# Patient Record
Sex: MALE | Race: WHITE | Employment: UNEMPLOYED | URBAN - NONMETROPOLITAN AREA
[De-identification: names, ages, dates, MRNs, and addresses within clinical notes are randomized per-mention and may not be internally consistent; named-entity substitution may affect disease eponyms.]

---

## 2021-10-26 ENCOUNTER — OFFICE VISIT (OUTPATIENT)
Dept: CARDIOLOGY CLINIC | Age: 61
End: 2021-10-26
Payer: COMMERCIAL

## 2021-10-26 VITALS
HEIGHT: 72 IN | HEART RATE: 133 BPM | DIASTOLIC BLOOD PRESSURE: 70 MMHG | WEIGHT: 151.6 LBS | SYSTOLIC BLOOD PRESSURE: 132 MMHG | BODY MASS INDEX: 20.53 KG/M2

## 2021-10-26 DIAGNOSIS — M79.89 LEG SWELLING: Primary | ICD-10-CM

## 2021-10-26 DIAGNOSIS — I48.21 PERMANENT ATRIAL FIBRILLATION (HCC): ICD-10-CM

## 2021-10-26 DIAGNOSIS — I42.0 DILATED CARDIOMYOPATHY (HCC): ICD-10-CM

## 2021-10-26 PROCEDURE — 93000 ELECTROCARDIOGRAM COMPLETE: CPT | Performed by: NUCLEAR MEDICINE

## 2021-10-26 PROCEDURE — 99204 OFFICE O/P NEW MOD 45 MIN: CPT | Performed by: NUCLEAR MEDICINE

## 2021-10-26 RX ORDER — METOPROLOL TARTRATE 100 MG/1
100 TABLET ORAL 2 TIMES DAILY
COMMUNITY
End: 2021-10-26 | Stop reason: SDUPTHER

## 2021-10-26 RX ORDER — FUROSEMIDE 40 MG/1
40 TABLET ORAL 2 TIMES DAILY
COMMUNITY
End: 2021-10-26 | Stop reason: ALTCHOICE

## 2021-10-26 RX ORDER — METOPROLOL TARTRATE 100 MG/1
100 TABLET ORAL 2 TIMES DAILY
Qty: 60 TABLET | Refills: 3 | Status: ON HOLD | OUTPATIENT
Start: 2021-10-26 | End: 2021-12-06

## 2021-10-26 RX ORDER — POTASSIUM CHLORIDE 1500 MG/1
20 TABLET, FILM COATED, EXTENDED RELEASE ORAL 2 TIMES DAILY
Qty: 60 TABLET | Refills: 3 | Status: SHIPPED | OUTPATIENT
Start: 2021-10-26 | End: 2021-12-13

## 2021-10-26 RX ORDER — POTASSIUM CHLORIDE 1500 MG/1
20 TABLET, FILM COATED, EXTENDED RELEASE ORAL 2 TIMES DAILY
COMMUNITY
End: 2021-10-26 | Stop reason: SDUPTHER

## 2021-10-26 RX ORDER — LANSOPRAZOLE 15 MG/1
15 CAPSULE, DELAYED RELEASE ORAL DAILY
COMMUNITY
End: 2022-02-24

## 2021-10-26 RX ORDER — METOPROLOL TARTRATE 75 MG/1
75 TABLET, FILM COATED ORAL 2 TIMES DAILY
COMMUNITY
End: 2021-10-26 | Stop reason: ALTCHOICE

## 2021-10-26 RX ORDER — BUMETANIDE 2 MG/1
2 TABLET ORAL 2 TIMES DAILY
Qty: 60 TABLET | Refills: 3 | Status: SHIPPED | OUTPATIENT
Start: 2021-10-26 | End: 2021-12-13

## 2021-10-26 RX ORDER — BUMETANIDE 2 MG/1
2 TABLET ORAL 2 TIMES DAILY
COMMUNITY
End: 2021-10-26 | Stop reason: SDUPTHER

## 2021-10-26 RX ORDER — LOSARTAN POTASSIUM 25 MG/1
25 TABLET ORAL DAILY
COMMUNITY
End: 2021-10-26 | Stop reason: SDUPTHER

## 2021-10-26 RX ORDER — LOSARTAN POTASSIUM 25 MG/1
25 TABLET ORAL DAILY
Qty: 30 TABLET | Refills: 3 | Status: SHIPPED | OUTPATIENT
Start: 2021-10-26 | End: 2021-12-22 | Stop reason: ALTCHOICE

## 2021-10-26 RX ORDER — DIAZEPAM 10 MG/1
2.5 TABLET ORAL 2 TIMES DAILY PRN
Status: ON HOLD | COMMUNITY
End: 2021-12-06

## 2021-10-26 ASSESSMENT — ENCOUNTER SYMPTOMS
ABDOMINAL DISTENTION: 0
ABDOMINAL PAIN: 0
BLOOD IN STOOL: 0
PHOTOPHOBIA: 1
CONSTIPATION: 0
COLOR CHANGE: 0
VOMITING: 0
DIARRHEA: 0
RECTAL PAIN: 0
ANAL BLEEDING: 0
BACK PAIN: 0
CHEST TIGHTNESS: 0
NAUSEA: 0
SHORTNESS OF BREATH: 1

## 2021-10-26 NOTE — PROGRESS NOTES
[address] [address] [address] [facility],4Th Floor [address] [facility]  Dept: [phone]  Dept Fax: [phone]  Loc: [phone]    Visit Date: 10/26/2021    Vanessa Mcdaniel is a 56 y.o. male who presents todayfor:  Chief Complaint   Patient presents with    New Patient    Congestive Heart Failure    Atrial Fibrillation   here for the first time  Looks like admitted for CHF   Was found to be in CHF   Echo with EF 15%  Also found to have A fib RVR   Looks like he left AMA from there  Here for evaluation   No known CAD before  No cath before  Does have dyspnea  Does have dyspnea on exertion   No obvious chest pain   BP is lower at times  No smoking  Used to smoke  Family history of CAD       HPI:  HPI  No past medical history on file. No past surgical history on file. No family history on file. Social History     Tobacco Use    Smoking status: Not on file   Substance Use Topics    Alcohol use: Not on file      Current Outpatient Medications   Medication Sig Dispense Refill    diazePAM (VALIUM) 10 MG tablet Take 2.5 mg by mouth 2 times daily as needed for Anxiety.  furosemide (LASIX) 40 MG tablet Take 40 mg by mouth 2 times daily      Metoprolol Tartrate 75 MG TABS Take 75 mg by mouth 2 times daily      lansoprazole (PREVACID) 15 MG delayed release capsule Take 15 mg by mouth daily      apixaban (ELIQUIS) 5 MG TABS tablet Take by mouth 2 times daily       No current facility-administered medications for this visit.      Not on File  Health Maintenance   Topic Date Due    Hepatitis C screen  Never done    COVID-19 Vaccine (1) Never done    HIV screen  Never done    Lipid screen  Never done    Colon cancer screen colonoscopy  Never done    DTaP/Tdap/Td vaccine (1 - Tdap) 09/10/2005    Shingles Vaccine (1 of 2) Never done    Flu vaccine (1) Never done    Hepatitis A vaccine  Aged Out    Hepatitis B vaccine  Aged Out    Hib vaccine  Aged C/ Shawn Alon 19 Meningococcal (ACWY) vaccine  Aged Out    Pneumococcal 0-64 years Vaccine  Aged Out       Subjective:  Review of Systems   Constitutional: Positive for fatigue. HENT: Negative for ear discharge and mouth sores. Eyes: Positive for photophobia. Respiratory: Positive for shortness of breath. Negative for chest tightness. Cardiovascular: Negative for chest pain, palpitations and leg swelling. Gastrointestinal: Negative for abdominal distention, abdominal pain, anal bleeding, blood in stool, constipation, diarrhea, nausea, rectal pain and vomiting. Endocrine: Negative for polyphagia. Genitourinary: Negative for enuresis, hematuria and urgency. Musculoskeletal: Negative for arthralgias, back pain, gait problem, joint swelling, myalgias, neck pain and neck stiffness. Skin: Negative for color change, pallor, rash and wound. Neurological: Negative for dizziness, syncope and light-headedness. Psychiatric/Behavioral: Negative for confusion, decreased concentration, dysphoric mood and hallucinations. The patient is not hyperactive. Objective:  Physical Exam  HENT:      Head: Normocephalic. Right Ear: Tympanic membrane normal.      Nose: Nose normal.      Mouth/Throat:      Mouth: Mucous membranes are moist.   Eyes:      Pupils: Pupils are equal, round, and reactive to light. Cardiovascular:      Rate and Rhythm: Normal rate and regular rhythm. Heart sounds: Murmur heard. No gallop. Pulmonary:      Effort: No respiratory distress. Breath sounds: No stridor. No wheezing, rhonchi or rales. Chest:      Chest wall: No tenderness. Abdominal:      General: There is no distension. Palpations: There is no mass. Tenderness: There is no abdominal tenderness. There is no right CVA tenderness, left CVA tenderness, guarding or rebound. Hernia: No hernia is present. Musculoskeletal:         General: No swelling, tenderness, deformity or signs of injury.       Cervical back: Normal range of motion. Right lower leg: No edema. Left lower leg: No edema. Skin:     Coloration: Skin is not jaundiced or pale. Findings: No bruising, erythema, lesion or rash. Neurological:      Mental Status: He is alert and oriented to person, place, and time. Cranial Nerves: No cranial nerve deficit. Sensory: No sensory deficit. Motor: No weakness. Coordination: Coordination normal.      Gait: Gait normal.      Deep Tendon Reflexes: Reflexes normal.   Psychiatric:         Mood and Affect: Mood normal.       /70   Pulse 133   Ht 6' (1.829 m)   Wt 151 lb 9.6 oz (68.8 kg)   BMI 20.56 kg/m²     Assessment:      Diagnosis Orders   1. Leg swelling  EKG 12 Lead   2. Permanent atrial fibrillation ([address]     3. Dilated cardiomyopathy ([address]     new onset A fib   New onset CHF   Severe CMP   Heart failure  Edema  Higher HR  ECG in office was done today. I reviewed the ECG. No acute findings, A fib RVR       Plan:  No follow-ups on file. Concerning patient   Needs cardioversion   Change metoprolol 100 mg bid  Add ACEI   Change to bumex  Add K dur  Check labs  Needs ischemia work up after that   Stress test vs cath   Continue risk factor modification and medical management  Thank you for allowing me to participate in the care of your patient. Please don't hesitate to contact me regarding any further issues related to the patient care    Orders Placed:  Orders Placed This Encounter   Procedures    EKG 12 Lead     Order Specific Question:   Reason for Exam?     Answer: Other       Medications Prescribed:  No orders of the defined types were placed in this encounter. Discussed use, benefit, and side effects of prescribed medications. All patient questions answered. Pt voicedunderstanding. Instructed to continue current medications, diet and exercise. Continue risk factor modification and medical management. Patient agreed with treatment plan.  Follow up as directed.     Electronically signedby Lotus Dexter MD on 10/26/2021 at 3:01 PM

## 2021-10-28 ENCOUNTER — TELEPHONE (OUTPATIENT)
Dept: CARDIOLOGY CLINIC | Age: 61
End: 2021-10-28

## 2021-10-28 NOTE — TELEPHONE ENCOUNTER
Evans/cv scheduled 11-10-21 @ 3:00pm  Pt informed, date, time and instructions reviewed and mailed to pt

## 2021-11-01 ENCOUNTER — TELEPHONE (OUTPATIENT)
Dept: CARDIOLOGY CLINIC | Age: 61
End: 2021-11-01

## 2021-11-09 ENCOUNTER — PREP FOR PROCEDURE (OUTPATIENT)
Dept: CARDIOLOGY | Age: 61
End: 2021-11-09

## 2021-11-09 RX ORDER — SODIUM CHLORIDE 0.9 % (FLUSH) 0.9 %
5-40 SYRINGE (ML) INJECTION PRN
Status: CANCELLED | OUTPATIENT
Start: 2021-11-09

## 2021-11-09 RX ORDER — SODIUM CHLORIDE 0.9 % (FLUSH) 0.9 %
5-40 SYRINGE (ML) INJECTION EVERY 12 HOURS SCHEDULED
Status: CANCELLED | OUTPATIENT
Start: 2021-11-09

## 2021-11-09 RX ORDER — SODIUM CHLORIDE 9 MG/ML
25 INJECTION, SOLUTION INTRAVENOUS PRN
Status: CANCELLED | OUTPATIENT
Start: 2021-11-09

## 2021-11-09 RX ORDER — SODIUM CHLORIDE 9 MG/ML
INJECTION, SOLUTION INTRAVENOUS CONTINUOUS
Status: CANCELLED | OUTPATIENT
Start: 2021-11-09

## 2021-11-10 ENCOUNTER — APPOINTMENT (OUTPATIENT)
Dept: CARDIAC CATH/INVASIVE PROCEDURES | Age: 61
End: 2021-11-10
Payer: COMMERCIAL

## 2021-11-10 PROCEDURE — 6370000000 HC RX 637 (ALT 250 FOR IP)

## 2021-11-11 ENCOUNTER — TELEPHONE (OUTPATIENT)
Dept: CARDIOLOGY CLINIC | Age: 61
End: 2021-11-11

## 2021-11-11 DIAGNOSIS — R93.1 ABNORMAL ECHOCARDIOGRAM: Primary | ICD-10-CM

## 2021-11-11 DIAGNOSIS — R94.30 EJECTION FRACTION < 50%: ICD-10-CM

## 2021-11-11 NOTE — TELEPHONE ENCOUNTER
MICHELLE from nurse on 2E that patient needs arrangements for a Life Vest. Has appt. scheduled with Dr. Juanita Gutierres Better patient.

## 2021-11-12 ENCOUNTER — TELEPHONE (OUTPATIENT)
Dept: CARDIOLOGY CLINIC | Age: 61
End: 2021-11-12

## 2021-11-12 NOTE — TELEPHONE ENCOUNTER
PROCEDURE: CARDIAC CATH. DATE OF SERVICE: 12/06/2021. SERVICE LOCATION: [facility]    CPT CODE: 85626    PHYSICIAN: DR Juliette Kawasaki. DATE PRIOR AUTH SUBMITTED: 11/12/2021    STATUS:  Approved.     CASE NUMBER: [id number]    AUTH NUMBER: [id number]    VALID: 12/06/2021

## 2021-11-12 NOTE — TELEPHONE ENCOUNTER
Patient verbalized understanding. Orders given to scheduling. DME faxed to [address] [phone] and [facility] notified. Cath order given to scheduling.

## 2021-12-06 ENCOUNTER — HOSPITAL ENCOUNTER (OUTPATIENT)
Dept: INPATIENT UNIT | Age: 61
Discharge: HOME | End: 2021-12-07
Attending: NUCLEAR MEDICINE | Admitting: INTERNAL MEDICINE
Payer: COMMERCIAL

## 2021-12-06 ENCOUNTER — PREP FOR PROCEDURE (OUTPATIENT)
Dept: CARDIOLOGY | Age: 61
End: 2021-12-06

## 2021-12-06 PROBLEM — I25.10 CAD IN NATIVE ARTERY: Status: ACTIVE | Noted: 2021-12-06

## 2021-12-06 LAB
ABO: NORMAL
ACTIVATED CLOTTING TIME: 386 SECONDS (ref 1–150)
ANION GAP SERPL CALCULATED.3IONS-SCNC: 10 MEQ/L (ref 8–16)
ANTIBODY SCREEN: NORMAL
APTT: 33.1 SECONDS (ref 22–38)
BUN BLDV-MCNC: 10 MG/DL (ref 7–22)
CALCIUM SERPL-MCNC: 8.9 MG/DL (ref 8.5–10.5)
CHLORIDE BLD-SCNC: 101 MEQ/L (ref 98–111)
CHOLESTEROL, TOTAL: 173 MG/DL (ref 100–199)
CO2: 24 MEQ/L (ref 23–33)
CREAT SERPL-MCNC: 0.8 MG/DL (ref 0.4–1.2)
EKG Q-T INTERVAL: 330 MS
EKG QRS DURATION: 70 MS
EKG QTC CALCULATION (BAZETT): 419 MS
EKG R AXIS: 72 DEGREES
EKG T AXIS: 75 DEGREES
EKG VENTRICULAR RATE: 97 BPM
ERYTHROCYTE [DISTWIDTH] IN BLOOD BY AUTOMATED COUNT: 12.2 % (ref 11.5–14.5)
ERYTHROCYTE [DISTWIDTH] IN BLOOD BY AUTOMATED COUNT: 43.8 FL (ref 35–45)
GFR SERPL CREATININE-BSD FRML MDRD: > 90 ML/MIN/1.73M2
GLUCOSE BLD-MCNC: 83 MG/DL (ref 70–108)
HCT VFR BLD CALC: 46.3 % (ref 42–52)
HDLC SERPL-MCNC: 66 MG/DL
HEMOGLOBIN: 15.9 GM/DL (ref 14–18)
INR BLD: 0.88 (ref 0.85–1.13)
LDL CHOLESTEROL CALCULATED: 93 MG/DL
MCH RBC QN AUTO: 33.3 PG (ref 26–33)
MCHC RBC AUTO-ENTMCNC: 34.3 GM/DL (ref 32.2–35.5)
MCV RBC AUTO: 97.1 FL (ref 80–94)
PLATELET # BLD: 197 THOU/MM3 (ref 130–400)
PMV BLD AUTO: 9.8 FL (ref 9.4–12.4)
POTASSIUM SERPL-SCNC: 5.2 MEQ/L (ref 3.5–5.2)
RBC # BLD: 4.77 MILL/MM3 (ref 4.7–6.1)
RH FACTOR: NORMAL
SODIUM BLD-SCNC: 135 MEQ/L (ref 135–145)
TRIGL SERPL-MCNC: 72 MG/DL (ref 0–199)
WBC # BLD: 10.8 THOU/MM3 (ref 4.8–10.8)

## 2021-12-06 PROCEDURE — 92928 PRQ TCAT PLMT NTRAC ST 1 LES: CPT | Performed by: NUCLEAR MEDICINE

## 2021-12-06 PROCEDURE — C1725 CATH, TRANSLUMIN NON-LASER: HCPCS

## 2021-12-06 PROCEDURE — 85347 COAGULATION TIME ACTIVATED: CPT

## 2021-12-06 PROCEDURE — C1874 STENT, COATED/COV W/DEL SYS: HCPCS

## 2021-12-06 PROCEDURE — 6370000000 HC RX 637 (ALT 250 FOR IP): Performed by: NURSE PRACTITIONER

## 2021-12-06 PROCEDURE — 86900 BLOOD TYPING SEROLOGIC ABO: CPT

## 2021-12-06 PROCEDURE — 85730 THROMBOPLASTIN TIME PARTIAL: CPT

## 2021-12-06 PROCEDURE — 86850 RBC ANTIBODY SCREEN: CPT

## 2021-12-06 PROCEDURE — 85027 COMPLETE CBC AUTOMATED: CPT

## 2021-12-06 PROCEDURE — 92928 PRQ TCAT PLMT NTRAC ST 1 LES: CPT | Performed by: INTERNAL MEDICINE

## 2021-12-06 PROCEDURE — 36415 COLL VENOUS BLD VENIPUNCTURE: CPT

## 2021-12-06 PROCEDURE — 80048 BASIC METABOLIC PNL TOTAL CA: CPT

## 2021-12-06 PROCEDURE — C1894 INTRO/SHEATH, NON-LASER: HCPCS

## 2021-12-06 PROCEDURE — 80061 LIPID PANEL: CPT

## 2021-12-06 PROCEDURE — 85610 PROTHROMBIN TIME: CPT

## 2021-12-06 PROCEDURE — 2580000003 HC RX 258: Performed by: NURSE PRACTITIONER

## 2021-12-06 PROCEDURE — 6360000004 HC RX CONTRAST MEDICATION: Performed by: NUCLEAR MEDICINE

## 2021-12-06 PROCEDURE — 93458 L HRT ARTERY/VENTRICLE ANGIO: CPT | Performed by: NUCLEAR MEDICINE

## 2021-12-06 PROCEDURE — 93010 ELECTROCARDIOGRAM REPORT: CPT | Performed by: NUCLEAR MEDICINE

## 2021-12-06 PROCEDURE — 86901 BLOOD TYPING SEROLOGIC RH(D): CPT

## 2021-12-06 PROCEDURE — C1887 CATHETER, GUIDING: HCPCS

## 2021-12-06 PROCEDURE — 93005 ELECTROCARDIOGRAM TRACING: CPT | Performed by: NURSE PRACTITIONER

## 2021-12-06 PROCEDURE — 6370000000 HC RX 637 (ALT 250 FOR IP): Performed by: INTERNAL MEDICINE

## 2021-12-06 PROCEDURE — C1769 GUIDE WIRE: HCPCS

## 2021-12-06 RX ORDER — SODIUM CHLORIDE 0.9 % (FLUSH) 0.9 %
5-40 SYRINGE (ML) INJECTION PRN
Status: CANCELLED | OUTPATIENT
Start: 2021-12-06

## 2021-12-06 RX ORDER — SODIUM CHLORIDE 0.9 % (FLUSH) 0.9 %
5-40 SYRINGE (ML) INJECTION EVERY 12 HOURS SCHEDULED
Status: DISCONTINUED | OUTPATIENT
Start: 2021-12-06 | End: 2021-12-07 | Stop reason: HOSPADM

## 2021-12-06 RX ORDER — SODIUM CHLORIDE 9 MG/ML
25 INJECTION, SOLUTION INTRAVENOUS PRN
Status: CANCELLED | OUTPATIENT
Start: 2021-12-06

## 2021-12-06 RX ORDER — AMIODARONE HYDROCHLORIDE 200 MG/1
200 TABLET ORAL DAILY
Status: DISCONTINUED | OUTPATIENT
Start: 2021-12-07 | End: 2021-12-07 | Stop reason: HOSPADM

## 2021-12-06 RX ORDER — ASPIRIN 325 MG
325 TABLET ORAL ONCE
Status: CANCELLED | OUTPATIENT
Start: 2021-12-06 | End: 2021-12-06

## 2021-12-06 RX ORDER — CLOPIDOGREL BISULFATE 75 MG/1
75 TABLET ORAL DAILY
Status: DISCONTINUED | OUTPATIENT
Start: 2021-12-08 | End: 2021-12-07 | Stop reason: HOSPADM

## 2021-12-06 RX ORDER — SODIUM CHLORIDE 9 MG/ML
INJECTION, SOLUTION INTRAVENOUS CONTINUOUS
Status: DISCONTINUED | OUTPATIENT
Start: 2021-12-06 | End: 2021-12-07 | Stop reason: HOSPADM

## 2021-12-06 RX ORDER — DIAZEPAM 5 MG/1
2.5 TABLET ORAL PRN
COMMUNITY

## 2021-12-06 RX ORDER — SODIUM CHLORIDE 9 MG/ML
25 INJECTION, SOLUTION INTRAVENOUS PRN
Status: DISCONTINUED | OUTPATIENT
Start: 2021-12-06 | End: 2021-12-07 | Stop reason: HOSPADM

## 2021-12-06 RX ORDER — SODIUM CHLORIDE 9 MG/ML
INJECTION, SOLUTION INTRAVENOUS CONTINUOUS
Status: DISCONTINUED | OUTPATIENT
Start: 2021-12-06 | End: 2021-12-06

## 2021-12-06 RX ORDER — NITROGLYCERIN 0.4 MG/1
0.4 TABLET SUBLINGUAL EVERY 5 MIN PRN
Status: DISCONTINUED | OUTPATIENT
Start: 2021-12-06 | End: 2021-12-07 | Stop reason: HOSPADM

## 2021-12-06 RX ORDER — SODIUM CHLORIDE 0.9 % (FLUSH) 0.9 %
5-40 SYRINGE (ML) INJECTION PRN
Status: DISCONTINUED | OUTPATIENT
Start: 2021-12-06 | End: 2021-12-07 | Stop reason: HOSPADM

## 2021-12-06 RX ORDER — METOPROLOL SUCCINATE 100 MG/1
100 TABLET, EXTENDED RELEASE ORAL DAILY
Status: DISCONTINUED | OUTPATIENT
Start: 2021-12-07 | End: 2021-12-07 | Stop reason: HOSPADM

## 2021-12-06 RX ORDER — ASPIRIN 325 MG
325 TABLET ORAL ONCE
Status: COMPLETED | OUTPATIENT
Start: 2021-12-06 | End: 2021-12-06

## 2021-12-06 RX ORDER — SODIUM CHLORIDE 0.9 % (FLUSH) 0.9 %
5-40 SYRINGE (ML) INJECTION EVERY 12 HOURS SCHEDULED
Status: DISCONTINUED | OUTPATIENT
Start: 2021-12-06 | End: 2021-12-06

## 2021-12-06 RX ORDER — ASPIRIN 81 MG/1
81 TABLET ORAL DAILY
Status: DISCONTINUED | OUTPATIENT
Start: 2021-12-06 | End: 2021-12-07 | Stop reason: HOSPADM

## 2021-12-06 RX ORDER — SODIUM CHLORIDE 9 MG/ML
25 INJECTION, SOLUTION INTRAVENOUS PRN
Status: DISCONTINUED | OUTPATIENT
Start: 2021-12-06 | End: 2021-12-06

## 2021-12-06 RX ORDER — ROSUVASTATIN CALCIUM 20 MG/1
40 TABLET, COATED ORAL NIGHTLY
Status: DISCONTINUED | OUTPATIENT
Start: 2021-12-06 | End: 2021-12-07 | Stop reason: HOSPADM

## 2021-12-06 RX ORDER — SODIUM CHLORIDE 9 MG/ML
INJECTION, SOLUTION INTRAVENOUS CONTINUOUS
Status: CANCELLED | OUTPATIENT
Start: 2021-12-06

## 2021-12-06 RX ORDER — SODIUM CHLORIDE 0.9 % (FLUSH) 0.9 %
5-40 SYRINGE (ML) INJECTION PRN
Status: DISCONTINUED | OUTPATIENT
Start: 2021-12-06 | End: 2021-12-06

## 2021-12-06 RX ORDER — SODIUM CHLORIDE 0.9 % (FLUSH) 0.9 %
5-40 SYRINGE (ML) INJECTION EVERY 12 HOURS SCHEDULED
Status: CANCELLED | OUTPATIENT
Start: 2021-12-06

## 2021-12-06 RX ORDER — ACETAMINOPHEN 325 MG/1
650 TABLET ORAL EVERY 4 HOURS PRN
Status: DISCONTINUED | OUTPATIENT
Start: 2021-12-06 | End: 2021-12-07 | Stop reason: HOSPADM

## 2021-12-06 RX ORDER — LOSARTAN POTASSIUM 25 MG/1
25 TABLET ORAL DAILY
Status: DISCONTINUED | OUTPATIENT
Start: 2021-12-07 | End: 2021-12-07 | Stop reason: HOSPADM

## 2021-12-06 RX ORDER — CLOPIDOGREL BISULFATE 75 MG/1
300 TABLET ORAL ONCE
Status: COMPLETED | OUTPATIENT
Start: 2021-12-07 | End: 2021-12-07

## 2021-12-06 RX ORDER — NITROGLYCERIN 0.4 MG/1
0.4 TABLET SUBLINGUAL EVERY 5 MIN PRN
Status: CANCELLED | OUTPATIENT
Start: 2021-12-06

## 2021-12-06 RX ADMIN — SODIUM CHLORIDE: 9 INJECTION, SOLUTION INTRAVENOUS at 12:42

## 2021-12-06 RX ADMIN — ROSUVASTATIN CALCIUM 40 MG: 20 TABLET, FILM COATED ORAL at 20:24

## 2021-12-06 RX ADMIN — ASPIRIN 325 MG: 325 TABLET ORAL at 13:09

## 2021-12-06 RX ADMIN — IOPAMIDOL 160 ML: 755 INJECTION, SOLUTION INTRAVENOUS at 14:54

## 2021-12-06 ASSESSMENT — PAIN SCALES - GENERAL: PAINLEVEL_OUTOF10: 0

## 2021-12-06 NOTE — H&P
[address] [address]  Sedation/Analgesia History & Physical    Pt Name: Ashley Lopez  Account number: [de-identified]  MRN: [id number]  YOB: 1960  Provider Performing Procedure: Mindy Black MD MD [facility]  Primary Care Physician: Gweneth Cranker, DO  Date: 12/6/2021    PRE-PROCEDURE    Code Status: FULL CODE  Brief History/Pre-Procedure Diagnosis:   Cardiomyopathy      Consent: : I have discussed with the patient risks, benefits, and alternatives (and relevant risks, benefits, and side effects related to alternatives or not receiving care), and likelihood of the success. The patient and/or representative appear to understand and agree to proceed. The discussion encompasses risks, benefits, and side effects related to the alternatives and the risks related to not receiving the proposed care, treatment, and services. MEDICAL HISTORY  []ASHD/ANGINA/MI/CHF   [x]Hypertension  []Diabetes  []Hyperlipidemia  []Smoking  []Family Hx of ASHD  []Additional information:       has a past medical history of Arthritis, CAD (coronary artery disease), and Hypertension. SURGICAL HISTORY   has a past surgical history that includes Carpal tunnel release (Right); Rotator cuff repair; and Tonsillectomy.   Additional information:       ALLERGIES   Allergies as of 12/06/2021    (No Known Allergies)     Additional information:       MEDICATIONS   Aspirin  [x] 81 mg  [] 325 mg  [] None  Antiplatelet drug therapy use last 5 days  []No []Yes  Coumadin Use Last 5 Days []No []Yes  Other anticoagulant use last 5 days  []No []Yes    Current Facility-Administered Medications:     0.9 % sodium chloride infusion, , IntraVENous, Continuous, Laly L Hempfling, APRN - CNP    0.9 % sodium chloride infusion, 25 mL, IntraVENous, PRN, Laly L Hempfling, APRN - CNP    aspirin tablet 325 mg, 325 mg, Oral, Once, Laly L Hempfling, APRN - CNP    nitroGLYCERIN (NITROSTAT) SL tablet 0.4 mg, 0.4 mg, SubLINGual, Q5 Min PRN, Laly L Hempfling, APRN - CNP    sodium chloride flush 0.9 % injection 5-40 mL, 5-40 mL, IntraVENous, 2 times per day, Laly L Hempfling, APRN - CNP    sodium chloride flush 0.9 % injection 5-40 mL, 5-40 mL, IntraVENous, PRN, Laly L Hempfling, APRN - CNP  Prior to Admission medications    Medication Sig Start Date End Date Taking? Authorizing Provider   Cholecalciferol (D3 VITAMIN PO) Take 1 capsule by mouth daily    Historical Provider, MD   metoprolol succinate (TOPROL XL) 100 MG extended release tablet Take 1 tablet by mouth daily 11/10/21   Panfilo Bird MD   amiodarone (CORDARONE) 200 MG tablet Take 1 tablet by mouth daily 11/10/21   Panfilo Bird MD   diazePAM (VALIUM) 10 MG tablet Take 2.5 mg by mouth 2 times daily as needed for Anxiety. Historical Provider, MD   lansoprazole (PREVACID) 15 MG delayed release capsule Take 15 mg by mouth daily    Historical Provider, MD   apixaban (ELIQUIS) 5 MG TABS tablet Take by mouth 2 times daily    Historical Provider, MD   bumetanide (BUMEX) 2 MG tablet Take 1 tablet by mouth 2 times daily 10/26/21   Zoheir A Shelley Brunner, MD   losartan (COZAAR) 25 MG tablet Take 1 tablet by mouth daily 10/26/21   Panfilo Bird MD   metoprolol (LOPRESSOR) 100 MG tablet Take 1 tablet by mouth 2 times daily 10/26/21   Panfilo Bird MD   potassium chloride (KLOR-CON M) 20 MEQ TBCR extended release tablet Take 1 tablet by mouth 2 times daily 10/26/21   Panfilo Bird MD     Additional information:       VITAL SIGNS   There were no vitals filed for this visit.     PHYSICAL:   General: No acute distress  HEENT:  Unremarkable for age  Neck: without increased JVD, carotid pulses 2+ bilaterally without bruits  Heart: RRR, S1 & S2 WNL, S4 gallop, without murmurs or rubs    Lungs: Clear to auscultation    Abdomen: BS present, without HSM, masses, or tenderness    Extremities: without C,C,E.  Pulses 2+ bilaterally  Mental Status: Alert & Oriented        PLANNED PROCEDURE   [x]Cath  []PCI                []Pacemaker/AICD  []YANDEL             []Cardioversion []Peripheral angiography/PTA  []Other:      SEDATION  Planned agent:[x]Midazolam []Meperidine [x]Sublimaze []Morphine  []Diazepam  []Other:     ASA Classification:  []1 [x]2 []3 []4 []5  Class 1: A normal healthy patient  Class 2: Pt with mild to moderate systemic disease  Class 3: Severe systemic disease or disturbance  Class 4: Severe systemic disorders that are already life threatening. Class 5: Moribund pt with little chances of survival, for more than 24 hours. Mallampati I Airway Classification:   []1 [x]2 []3 []4    [x]Pre-procedure diagnostic studies complete and results available. Comment:    [x]Previous sedation/anesthesia experiences assessed. Comment:    [x]The patient is an appropriate candidate to undergo the planned procedure sedation and anesthesia. (Refer to nursing sedation/analgesia documentation record)  [x]Formulation and discussion of sedation/procedure plan, risks, and expectations with patient and/or responsible adult completed. [x]Patient examined immediately prior to the procedure.  (Refer to nursing sedation/analgesia documentation record)    Ana Cristina MD MD [facility]  Electronically signed 12/6/2021 at 12:20 PM

## 2021-12-06 NOTE — PROGRESS NOTES
Pt admitted to  2E16 ambulatory for cardiac catheterization with possible PCI with Dr Manda Lew. Pt NPO. Patient is alone, will call his daughter at time of discharge. .   Vital signs obtained. Assessment and data collection initiated. Oriented to room. Policies and procedures for  explained   All questions answered with no further questions at this time. Fall prevention and safety precautions discussed with patient.

## 2021-12-06 NOTE — PROGRESS NOTES
Care taken over from cath lab. Right radial site stable, no bleeding seen, site soft. Armboard continued with vascband. Patient instructed not to bend wrist, not to put pressure on wrist and not to lift or twist with wrist. Patient voices understanding. No family present. 0.9 NS infusing. Pt tolerating sips of water. Will continue to [facility].

## 2021-12-06 NOTE — PROCEDURES
[address] [id number]                            CARDIAC CATHETERIZATION    PATIENT NAME: Araceli Sparks                     :        1960  MED REC NO:   [id number]                           ROOM:       0016  ACCOUNT NO:   [de-identified]                           ADMIT DATE: 2021  PROVIDER:     Alexsandra Bustos M.D.    DATE OF PROCEDURE:  2021    CLINICAL HISTORY AND INDICATION:  This is a 49-year-old patient with  new-onset cardiomyopathy, referred for cardiac catheterization due to  significant cardiomyopathy and multiple risk factors for coronary artery  disease with recent onset atrial fibrillation. PROCEDURES:  1. Left heart cath with LV-gram.  2.  Coronary angiogram, right and left. 3.  Sedation:  2 of Versed, 25 of fentanyl between 02:00 and 02:30 p.m.  in my presence under my supervision. 4.  Blood loss less than 10 mL. PROCEDURE DETAILS:  Please refer to my catheterization detailed note. HEMODYNAMIC RESULTS AND LEFT VENTRICULOGRAM:  Left ventricular  end-diastolic pressure was 12 mmHg. No significant change before and  after contrast injection. No significant gradient across the aortic  valve to signify aortic stenosis. Left ventricular function was  globally hypokinetic.  EF of 35%. CORONARY ARTERIOGRAM RESULTS:  1. Left main is patent, gives rise to LAD and left circumflex. 2.  LAD has a mid 70% narrowing. 3.  Left circumflex artery is codominant, patent. 4.  Right coronary artery is large, dominant and has mild disease. CONCLUSION:  1. Single-vessel disease involving the mid LAD. 2.  Cardiomyopathy. 3.  No complications.     RECOMMENDATIONS:  At this point, case was discussed with interventional  team.  It was felt that due to the involvement of the LAD in a patient  with cardiomyopathy, would benefit from consideration of stenting of the  LAD which will be dictated in a separate report.         Ehsan Childs M.D.    D: 12/06/2021 14:35:13       T: 12/06/2021 14:40:02     [email or web address]  Job#: [id number]     Doc#: [id number]    CC:

## 2021-12-07 VITALS
HEIGHT: 72 IN | TEMPERATURE: 98 F | BODY MASS INDEX: 20.32 KG/M2 | DIASTOLIC BLOOD PRESSURE: 79 MMHG | HEART RATE: 96 BPM | OXYGEN SATURATION: 98 % | WEIGHT: 150 LBS | RESPIRATION RATE: 16 BRPM | SYSTOLIC BLOOD PRESSURE: 115 MMHG

## 2021-12-07 LAB
ANION GAP SERPL CALCULATED.3IONS-SCNC: 9 MEQ/L (ref 8–16)
BUN BLDV-MCNC: 15 MG/DL (ref 7–22)
CALCIUM SERPL-MCNC: 8.3 MG/DL (ref 8.5–10.5)
CHLORIDE BLD-SCNC: 105 MEQ/L (ref 98–111)
CO2: 21 MEQ/L (ref 23–33)
CREAT SERPL-MCNC: 0.8 MG/DL (ref 0.4–1.2)
ERYTHROCYTE [DISTWIDTH] IN BLOOD BY AUTOMATED COUNT: 12.2 % (ref 11.5–14.5)
ERYTHROCYTE [DISTWIDTH] IN BLOOD BY AUTOMATED COUNT: 42.8 FL (ref 35–45)
GFR SERPL CREATININE-BSD FRML MDRD: > 90 ML/MIN/1.73M2
GLUCOSE BLD-MCNC: 106 MG/DL (ref 70–108)
HCT VFR BLD CALC: 42.3 % (ref 42–52)
HEMOGLOBIN: 14.5 GM/DL (ref 14–18)
MCH RBC QN AUTO: 33 PG (ref 26–33)
MCHC RBC AUTO-ENTMCNC: 34.3 GM/DL (ref 32.2–35.5)
MCV RBC AUTO: 96.4 FL (ref 80–94)
PLATELET # BLD: 169 THOU/MM3 (ref 130–400)
PMV BLD AUTO: 10 FL (ref 9.4–12.4)
POTASSIUM SERPL-SCNC: 4.7 MEQ/L (ref 3.5–5.2)
RBC # BLD: 4.39 MILL/MM3 (ref 4.7–6.1)
SODIUM BLD-SCNC: 135 MEQ/L (ref 135–145)
WBC # BLD: 8.4 THOU/MM3 (ref 4.8–10.8)

## 2021-12-07 PROCEDURE — 80048 BASIC METABOLIC PNL TOTAL CA: CPT

## 2021-12-07 PROCEDURE — 6370000000 HC RX 637 (ALT 250 FOR IP): Performed by: INTERNAL MEDICINE

## 2021-12-07 PROCEDURE — 85027 COMPLETE CBC AUTOMATED: CPT

## 2021-12-07 PROCEDURE — 6370000000 HC RX 637 (ALT 250 FOR IP): Performed by: NUCLEAR MEDICINE

## 2021-12-07 PROCEDURE — 99232 SBSQ HOSP IP/OBS MODERATE 35: CPT | Performed by: STUDENT IN AN ORGANIZED HEALTH CARE EDUCATION/TRAINING PROGRAM

## 2021-12-07 PROCEDURE — 36415 COLL VENOUS BLD VENIPUNCTURE: CPT

## 2021-12-07 RX ORDER — CLOPIDOGREL BISULFATE 75 MG/1
75 TABLET ORAL ONCE
Status: COMPLETED | OUTPATIENT
Start: 2021-12-07 | End: 2021-12-07

## 2021-12-07 RX ORDER — CLOPIDOGREL BISULFATE 75 MG/1
75 TABLET ORAL DAILY
Qty: 30 TABLET | Refills: 3 | Status: SHIPPED | OUTPATIENT
Start: 2021-12-08 | End: 2022-01-03 | Stop reason: SDUPTHER

## 2021-12-07 RX ORDER — ROSUVASTATIN CALCIUM 40 MG/1
40 TABLET, COATED ORAL NIGHTLY
Qty: 30 TABLET | Refills: 3 | Status: SHIPPED | OUTPATIENT
Start: 2021-12-07 | End: 2022-01-05 | Stop reason: SDUPTHER

## 2021-12-07 RX ORDER — ASPIRIN 81 MG/1
81 TABLET ORAL DAILY
Qty: 30 TABLET | Refills: 3 | Status: SHIPPED | OUTPATIENT
Start: 2021-12-08 | End: 2022-01-03 | Stop reason: SDUPTHER

## 2021-12-07 RX ORDER — NITROGLYCERIN 0.4 MG/1
TABLET SUBLINGUAL
Qty: 25 TABLET | Refills: 3 | Status: SHIPPED | OUTPATIENT
Start: 2021-12-07 | End: 2022-10-20 | Stop reason: SDUPTHER

## 2021-12-07 RX ORDER — DIGOXIN 125 MCG
125 TABLET ORAL DAILY
Qty: 30 TABLET | Refills: 3 | Status: SHIPPED | OUTPATIENT
Start: 2021-12-07 | End: 2022-01-03 | Stop reason: SDUPTHER

## 2021-12-07 RX ADMIN — ASPIRIN 81 MG: 81 TABLET, COATED ORAL at 08:01

## 2021-12-07 RX ADMIN — METOPROLOL SUCCINATE 100 MG: 100 TABLET, EXTENDED RELEASE ORAL at 08:02

## 2021-12-07 RX ADMIN — CLOPIDOGREL BISULFATE 300 MG: 75 TABLET ORAL at 03:54

## 2021-12-07 RX ADMIN — CLOPIDOGREL BISULFATE 75 MG: 75 TABLET ORAL at 10:11

## 2021-12-07 RX ADMIN — LOSARTAN POTASSIUM 25 MG: 25 TABLET ORAL at 08:02

## 2021-12-07 RX ADMIN — AMIODARONE HYDROCHLORIDE 200 MG: 200 TABLET ORAL at 08:02

## 2021-12-07 NOTE — FLOWSHEET NOTE
Discharge instructions with AVS review completed. Stent card given to pt. Patient received pamphlet about cardiac intervention, how to take care of the incision site, mended hearts program, cardiac rehab and the hours of operations, and Nutritional information regarding cardiac diet. Questions and concerns addressed. Iv fluids stopped. Iv catheter removed. Telemetry off. Preparing for discharge to home post ptca implant.

## 2021-12-07 NOTE — PROCEDURES
[address] [place] [address]                            CARDIAC CATHETERIZATION    PATIENT NAME: Jessica Rueda                     :        1960  MED REC NO:   [id number]                           ROOM:       0016  ACCOUNT NO:   [de-identified]                           ADMIT DATE: 2021  PROVIDER:     Buckley Sacks, MD    DATE OF PROCEDURE:  2021    INDICATIONS FOR PROCEDURE:  New onset cardiomyopathy. PROCEDURES PERFORMED:  Successful PCI and stenting of the mid segment of  left anterior descending artery. DESCRIPTION OF PROCEDURE AND INTERVENTION DETAILS:  Risks, benefits,  indications, alternatives of the procedure were discussed with the  patient, he agreed to proceed. I did discuss the findings of the left  cardiac catheterization with Dr. Betzy Ma, please refer to his dictated  note for more details. The patient has evidence of severely stenotic  lesion in the mid segment of the left anterior descending artery. Cardiomyopathy is persistent with regional wall motion abnormalities  consistent with the mid to apical hypokinesis with reduced ejection  fraction based on the left ventriculogram today. The patient had recent  cardioversion and cardiomyopathy presence. A 6-Welsh EBU 3.5 guide  catheter was used to cannulate the left main coronary artery. Runthrough wire was used to cross the lesion. I then proceeded with  predilation using 3.0 x 12 mm PTCA balloon. Resolute Stacy 3.0 x 22 mm  drug-eluting stent was successfully deployed. This was postdilated  using a 3.5 x 12 mm noncompliant balloon. Wire was removed. Final  angiogram revealed well-expanded stent, 0% residual stenosis. No  complications including no dissection, distal embolization or  perforation. MEDICATIONS:  See MAR. COMPLICATIONS:  None. ESTIMATED BLOOD LOSS:  Less than 50 mL. ACCESS:  Right radial artery access.   Vasc Band was applied. Hemostasis  was achieved. IMPRESSION:  1. Ischemic cardiomyopathy. 2.  Coronary artery disease. 3.  Severely stenotic lesion of mid segment of left anterior descending  artery, status post successful PCI and stenting. PLAN:  The patient will be admitted to hospital for observation post PCI  of LAD. Aggressive risk factor modification. Dual-antiplatelet therapy  with aspirin and Plavix. High intensity statin therapy. Guideline-directed medical therapy for congestive heart failure with  reduced ejection fraction. May resume oral anticoagulation with Eliquis  tomorrow if condition remains stable. The patient is on Eliquis for  newly diagnosed atrial fibrillation.           Ghada Polk MD    D: 12/06/2021 15:20:32       T: 12/06/2021 15:24:39     [email or web address]  Job#: [id number]     Doc#: [id number]    CC:

## 2021-12-07 NOTE — PROGRESS NOTES
Cardiology Progress Note      Patient:  Chilango Richards  YOB: 1960  MRN: [id number]   Acct: [de-identified]  Admit Date:  12/6/2021  Primary Cardiologist: Letty Lange MD    Patient presented for OP cath    Subjective (Events in last 24 hours):   Pt awake, alert. NAD. Denies CP, SOB, swelling, wrist site pain. D/w patient about importance of brilinta/plavix for new heart stent and the difference between eliquis and brilinta as blood thinners. Patient accepts risk of bleeding to prevent ISR and prevent blood clot/stroke. Will get cardiac rehab and CHF referral. D/w patient about HR, will try to increase metoprolol. D/w patient about cath precautions and strict return precautions for cath site and other symptoms. Objective:   /68   Pulse 88   Temp 98 °F (36.7 °C) (Oral)   Resp 16   Ht 6' (1.829 m)   Wt 150 lb (68 kg)   SpO2 98%   BMI 20.34 kg/m²      vss  TELEMETRY: afib, 90s-105    Physical Exam:  General Appearance: alert and oriented to person, place and time, in no acute distress  Cardiovascular: irregularly irregular rhythm, normal S1 and S2, no murmurs, rubs, clicks, or gallops, distal pulses intact, no carotid bruits, no JVD  Pulmonary/Chest: clear to auscultation bilaterally- no wheezes, rales or rhonchi, normal air movement, no respiratory distress  Abdomen: soft, non-tender, non-distended, normal bowel sounds, no masses   Extremities: no cyanosis, clubbing or edema, pulse   Skin: warm and dry, no rash or erythema  Neurological: alert, oriented, normal speech, no focal findings or movement disorder noted  Right radial site-- minimal ecchymosis, nontender, no edema, nV check WNL.    Medications:    amiodarone  200 mg Oral Daily    losartan  25 mg Oral Daily    metoprolol succinate  100 mg Oral Daily    sodium chloride flush  5-40 mL IntraVENous 2 times per day    rosuvastatin  40 mg Oral Nightly    aspirin  81 mg Oral Daily    [START ON 12/8/2021] clopidogrel  75 mg Oral Daily      sodium chloride      sodium chloride 50 mL/hr at 12/06/21 1711     nitroGLYCERIN, 0.4 mg, Q5 Min PRN  sodium chloride flush, 5-40 mL, PRN  sodium chloride, 25 mL, PRN  acetaminophen, 650 mg, Q4H PRN        Diagnostics:  EKG:   Normal sinus rhythm  Possible Left atrial enlargement  Low voltage QRS, consider pulmonary disease, pericardial effusion, or normal variant  Nonspecific ST and T wave abnormality  Abnormal ECG  When compared with ECG of 10-NOV-2021 13:34,  Sinus rhythm has replaced Atrial fibrillation  Confirmed by [facility] ([id number]) on 11/11/2021   Echo:   Conclusions      Summary   Ejection fraction is visually estimated at 25%. There was severe global hypokinesis of the left ventricle. Mild mitral regurgitation is present. Signature      ----------------------------------------------------------------   Electronically signed by Chapo Mckinney MD (Interpreting   physician) on 11/10/2021  Stress:     Left Heart Cath:   ACCESS:  Right radial artery access. Vasc Band was applied. Hemostasis  was achieved.     IMPRESSION:  1. Ischemic cardiomyopathy. 2.  Coronary artery disease. 3.  Severely stenotic lesion of mid segment of left anterior descending  artery, status post successful PCI and stenting.     PLAN:  The patient will be admitted to hospital for observation post PCI  of LAD. Aggressive risk factor modification. Dual-antiplatelet therapy  with aspirin and Plavix. High intensity statin therapy. Guideline-directed medical therapy for congestive heart failure with  reduced ejection fraction. May resume oral anticoagulation with Eliquis  tomorrow if condition remains stable. The patient is on Eliquis for  newly diagnosed atrial fibrillation.              Savannah Warrne MD     D: 12/06/2021  Lab Data:    Cardiac Enzymes:  No results for input(s): CKTOTAL, CKMB, CKMBINDEX, TROPONINI in the last 72 hours.     CBC:   Lab Results   Component Value Date    WBC 8.4 12/07/2021 RBC 4.39 12/07/2021    HGB 14.5 12/07/2021    HCT 42.3 12/07/2021     12/07/2021       CMP:    Lab Results   Component Value Date     12/07/2021    K 4.7 12/07/2021     12/07/2021    CO2 21 12/07/2021    BUN 15 12/07/2021    CREATININE 0.8 12/07/2021    LABGLOM >90 12/07/2021    GLUCOSE 106 12/07/2021    CALCIUM 8.3 12/07/2021       Hepatic Function Panel:  No results found for: ALKPHOS, ALT, AST, PROT, BILITOT, BILIDIR, IBILI, LABALBU    Magnesium:  No results found for: MG    PT/INR:    Lab Results   Component Value Date    INR 0.88 12/06/2021       HgBA1c:  No results found for: LABA1C    FLP:    Lab Results   Component Value Date    TRIG 72 12/06/2021    HDL 66 12/06/2021    LDLCALC 93 12/06/2021       TSH:  No results found for: TSH      Assessment:  ICDMP s/p [facility]  CAD s/p PCI to LAD for severely stenotic lesion of mid segment   EF 25%--has lifevest   Atrial fibrillation s/p YANDEL/CV -on eliquis, amiodarone     Plan:  ACS Guidelines  ASA/Plavix/brilinta-yes, ASA/brilinta  Statin- Yes. crestor 40 mg daily   BB-yes. toprol 100 mg daily  ACE/ARB-yes. Losartan 25 mg daily. Repatha-? Consider later. Cardiac Rehab-ordered. 2 g sodium restriction daily  2 L fluid restriction daily  Keep K>4, Mg>2    CHF Guidelines  BB-yes. toprol 100 mg daily. ACE/ARB/Entresto- losartan 25 mg daily. Consider entresto as OP,  check pricing  Diuretics-yes, bumex 2 mg daily. Aldactone-no. Consider later  Farxiga-no. Depends on insurance. CHF referral.     HOLD eliquis for another day d/t abrasion on left arm. Per Dr herrera---STOP amiodarone, start digoxin. Referral to dr Bharat Bowen for afib as outpatient.    Patient condition at discharge: stable  Disposition: home  Electronically signed by Jaimie Edmond PA-C on 12/7/2021 at 8:21 AM

## 2021-12-07 NOTE — PROGRESS NOTES
0400 Report from [facility], bedside rounding done, Rt radial site stable, bruised but soft, no complaints.    0500 Report to María Gonsales.

## 2021-12-07 NOTE — FLOWSHEET NOTE
Took over care of pt. Right wrist procedure site stable. Armboard cont. Pt denies pain or needs. 0.9 normal saline cont.

## 2021-12-07 NOTE — PROGRESS NOTES
Inpatient Cardiac Rehabilitation Consult    Received consult for Phase II Cardiac Rehabilitation. Patient needs cardiac rehab due to PCI on 12-6-21. Importance of Cardiac Rehab discussed with patient. Patient authorized referral to [facility].

## 2021-12-09 PROBLEM — I50.42 CHRONIC COMBINED SYSTOLIC AND DIASTOLIC CHF (CONGESTIVE HEART FAILURE) (HCC): Status: ACTIVE | Noted: 2021-12-09

## 2021-12-12 NOTE — PROGRESS NOTES
Heart Failure Clinic       Visit Date: 2021  Cardiologist:  Dr. Layne Carrington  Primary Care Physician: Dr. Jayce Donnelly,     Gael Zamora is a 59 y.o. male who presents today for:  Chief Complaint   Patient presents with    Congestive Heart Failure     New Patient        HPI:   Gael Zamora is a 59 y.o. male who presents to the office for a NEW patient visit in the heart failure clinic. Accompanied by no one    TYPE HF: HFrEF (25%)  Cause: ICM/tachy induced  Device: Lifevest  HX: Afib s/p DCCV (2021), CAD s/p PCI LAD (2021), Former smoker    Dry Wt: Hospitalization:  10/2021 [facility] -sent from PCP new Afib RVR. CHARLES, SOB, wt gain. Left AMA  OV Baki - 10/26/21 - EF 15%  11/10/21 - DCCV successful   21 - LHC - PCI LAD. LVEDP 12 mmHg. Back in Afib    Today: swelling improved. Wt down 12# - 138# (was 150# whole life)  Denies SOB, chest pain, palpitations. Activity: walked from entrance, no break. Working on house, remodeling  Diet: watching closer but does a lot processed foods  Still smoking few - cutting back  Some lightheadedness - first thing AM, in shower. Patient has:  Chest Pain: no  SOB: no  Orthopnea/PND: no  LINDSAY: no  Edema: no  Fatigue: no  Abdominal bloating: no  Cough: no  Appetite: good  Home weight: no scale  Home blood pressure: no cuff    Past Medical History:   Diagnosis Date    Anxiety     Arthritis     Atrial fibrillation (HCC)     CAD (coronary artery disease)     atrial fib    Cardiomyopathy ([address]     Hypertension      Past Surgical History:   Procedure Laterality Date    CARDIOVERSION      CARPAL TUNNEL RELEASE Right     ROTATOR CUFF REPAIR      TONSILLECTOMY       No family history on file. Social History     Tobacco Use    Smoking status: Former Smoker     Types: Cigarettes     Quit date: 10/24/2021     Years since quittin.1    Smokeless tobacco: Never Used   Substance Use Topics    Alcohol use:  Yes     Alcohol/week: 12.0 standard drinks     Types: 12 Cans of beer per week     Current Outpatient Medications   Medication Sig Dispense Refill    sildenafil (VIAGRA) 50 MG tablet Take by mouth      sacubitril-valsartan (ENTRESTO) 24-26 MG per tablet Take 1 tablet by mouth 2 times daily 60 tablet 5    spironolactone (ALDACTONE) 25 MG tablet Take 1 tablet by mouth daily 30 tablet 5    potassium chloride (KLOR-CON M) 20 MEQ TBCR extended release tablet Take 1 tablet by mouth daily 30 tablet 5    bumetanide (BUMEX) 2 MG tablet Take 1 tablet by mouth daily 30 tablet 5    aspirin 81 MG EC tablet Take 1 tablet by mouth daily 30 tablet 3    nitroGLYCERIN (NITROSTAT) 0.4 MG SL tablet up to max of 3 total doses. If no relief after 1 dose, call 911. 25 tablet 3    rosuvastatin (CRESTOR) 40 MG tablet Take 1 tablet by mouth nightly 30 tablet 3    clopidogrel (PLAVIX) 75 MG tablet Take 1 tablet by mouth daily 30 tablet 3    digoxin (LANOXIN) 125 MCG tablet Take 1 tablet by mouth daily 30 tablet 3    diazePAM (VALIUM) 5 MG tablet Take 2.5 mg by mouth 2 times daily as needed for Anxiety.  Cholecalciferol (D3 VITAMIN PO) Take 1 capsule by mouth daily      metoprolol succinate (TOPROL XL) 100 MG extended release tablet Take 1 tablet by mouth daily 30 tablet 3    lansoprazole (PREVACID) 15 MG delayed release capsule Take 15 mg by mouth daily      apixaban (ELIQUIS) 5 MG TABS tablet Take 5 mg by mouth 2 times daily       losartan (COZAAR) 25 MG tablet Take 1 tablet by mouth daily 30 tablet 3    NICOTINE MINI 2 MG lozenge        No current facility-administered medications for this visit. No Known Allergies    SUBJECTIVE:   Review of Systems   Constitutional: Negative for activity change, appetite change and fatigue. Respiratory: Negative for cough and shortness of breath. Cardiovascular: Negative for chest pain, palpitations and leg swelling. Gastrointestinal: Negative for abdominal distention.    Neurological: Negative for weakness, light-headedness and headaches. Hematological: Negative for adenopathy. Psychiatric/Behavioral: Negative for sleep disturbance. OBJECTIVE:   Today's Vitals:  BP 92/60   Pulse 92   Ht 6' (1.829 m)   Wt 138 lb (62.6 kg)   SpO2 98%   BMI 18.72 kg/m²     Physical Exam  Vitals reviewed. Constitutional:       General: He is not in acute distress. Appearance: Normal appearance. He is well-developed. He is not diaphoretic. HENT:      Head: Normocephalic and atraumatic. Eyes:      Conjunctiva/sclera: Conjunctivae normal.   Neck:      Comments: No JVD  Cardiovascular:      Rate and Rhythm: Normal rate and regular rhythm. Heart sounds: Normal heart sounds. No murmur heard. Comments: Lifevest on  Pulmonary:      Effort: Pulmonary effort is normal. No respiratory distress. Breath sounds: Normal breath sounds. No wheezing or rales. Abdominal:      General: Bowel sounds are normal. There is no distension. Palpations: Abdomen is soft. Tenderness: There is no abdominal tenderness. Musculoskeletal:         General: Normal range of motion. Cervical back: Normal range of motion and neck supple. Right lower leg: No edema. Left lower leg: No edema. Skin:     General: Skin is warm and dry. Capillary Refill: Capillary refill takes less than 2 seconds. Neurological:      Mental Status: He is alert and oriented to person, place, and time. Coordination: Coordination normal.   Psychiatric:         Behavior: Behavior normal.       Wt Readings from Last 3 Encounters:   12/13/21 138 lb (62.6 kg)   12/06/21 150 lb (68 kg)   11/10/21 151 lb (68.5 kg)     BP Readings from Last 3 Encounters:   12/13/21 92/60   12/07/21 115/79   11/10/21 109/86     Pulse Readings from Last 3 Encounters:   12/13/21 92   12/07/21 96   11/10/21 90     Body mass index is 18.72 kg/m². ECHO:    Summary   Ejection fraction is visually estimated at 25%.    There was severe global hypokinesis of the left ventricle. Mild mitral regurgitation is present. Signature      ----------------------------------------------------------------   Electronically signed by Juan Antonio Goncalves MD (Interpreting   physician) on 11/10/2021 at 04:08 PM      CATH/STRESS:   IMPRESSION:  1. Ischemic cardiomyopathy. 2.  Coronary artery disease. 3.  Severely stenotic lesion of mid segment of left anterior descending  artery, status post successful PCI and stenting.     PLAN:  The patient will be admitted to hospital for observation post PCI  of LAD. Aggressive risk factor modification. Dual-antiplatelet therapy  with aspirin and Plavix. High intensity statin therapy. Guideline-directed medical therapy for congestive heart failure with  reduced ejection fraction. May resume oral anticoagulation with Eliquis  tomorrow if condition remains stable. The patient is on Eliquis for  newly diagnosed atrial fibrillation. Chau Gabriel MD     D: 12/06/2021 15:20:32       Results reviewed:  BNP: No results found for: BNP  CBC:   Lab Results   Component Value Date    WBC 8.4 12/07/2021    RBC 4.39 12/07/2021    HGB 14.5 12/07/2021    HCT 42.3 12/07/2021     12/07/2021     CMP:    Lab Results   Component Value Date     12/07/2021    K 4.7 12/07/2021     12/07/2021    CO2 21 12/07/2021    BUN 15 12/07/2021    CREATININE 0.8 12/07/2021    LABGLOM >90 12/07/2021    GLUCOSE 106 12/07/2021    CALCIUM 8.3 12/07/2021     Hepatic Function Panel:  No results found for: ALKPHOS, ALT, AST, PROT, BILITOT, BILIDIR, IBILI, LABALBU  Magnesium:  No results found for: MG  PT/INR:    Lab Results   Component Value Date    INR 0.88 12/06/2021     Lipids:    Lab Results   Component Value Date    TRIG 72 12/06/2021    HDL 66 12/06/2021    LDLCALC 93 12/06/2021       ASSESSMENT AND PLAN:   The patient's condition/symptoms are Stable: No clinical evidence of fluid overload today.  Continue current medical regimen without changes at present time. Diagnosis Orders   1. CHF (congestive heart failure), NYHA class II, chronic, systolic ([facility])  Basic Metabolic Panel   2. Atrial fibrillation, unspecified type [facility])  Basic Metabolic Panel    EKG 12 Lead   3. Ischemic cardiomyopathy     4. Cigarette nicotine dependence without complication       Continue:  GDMT:   ACE/ARB/ARNI - Losartan 25/day - check $ Entresto   BB - Toprol 100/day   Diuretic - Bumex 2 BID, Kcl 20 BID - decrease Bumex 2/day and Kcl 20/day  AA - Add Aldactone 25/day   SGLT2 -  Check $ Farxiga/Jardiance  Vasodilator - no  Other - Digoxin, Plavix, Eliquis, ASA  HFrEF NYHA II - EF 25%  New Afib s/p DCCV 11/10  CAD s/p PCI 12/6    Stable, appears Euvolemic  Lab reviewed - stable  Repeat blood work in 1 week  BP/HR stable - BP little low today - ?dry, down 12#  HT regular -? Back in sinus - EKG done - still in Afib  Decrease Bumex 2/day   ADD Aldactone 25/day  Continue Lifevest.  Repeat ECHO - has appt w/ Dm next week will let him eval (?ECHO sooner d/t s/p DCCV)  Encouraged daily wts  Encouraged/discussed diet/fluid adherence. New HF Pamphlet given and ZONE sheet reviewed, patient voices understanding. Questions answered. Smoking cessation discussed for 3 min. Trying to cut back    F/U w/ Cardiology  F/U in clinic in 3 months    Tolerating above noted HF meds, no ill side effects noted. Will continue to monitor kidney function and electrolytes. Will optimize as tolerated. Pt is compliant w/ medications. Total visit time of 40 minutes has been spent with patient on education of symptoms, management, medication, and plan of care; as well as review of chart: labs, ECHO, radiology reports, etc.   I personally spent more then 50% of the appt time face to face with the patient.   · Daily weights  · Fluid restriction of 2 Liters per day  · Limit sodium in diet to around 7628-2431 mg/day  · Monitor BP  · Activity as tolerated     Patient was instructed to call the Heart Failure Clinic for any changes in symptoms as noted in AVS.      Return in about 2 months (around 2/13/2022). or sooner if needed     Patient given educational materials - see patient instructions. We discussed the importance of weighing oneself and recording daily. We also discussed the importance of a low sodium diet, higher sodium foods to avoid and better low sodium food options. Patient verbalizes understanding of plan of care using teach back method, and is agreeable to the treatment plan.        Electronically signed by JIA Leavitt CNP on 12/13/2021 at 9:16 AM

## 2021-12-13 ENCOUNTER — OFFICE VISIT (OUTPATIENT)
Dept: CARDIOLOGY CLINIC | Age: 61
End: 2021-12-13
Payer: COMMERCIAL

## 2021-12-13 ENCOUNTER — TELEPHONE (OUTPATIENT)
Dept: CARDIOLOGY CLINIC | Age: 61
End: 2021-12-13

## 2021-12-13 VITALS
SYSTOLIC BLOOD PRESSURE: 92 MMHG | HEIGHT: 72 IN | BODY MASS INDEX: 18.69 KG/M2 | OXYGEN SATURATION: 98 % | WEIGHT: 138 LBS | DIASTOLIC BLOOD PRESSURE: 60 MMHG | HEART RATE: 92 BPM

## 2021-12-13 DIAGNOSIS — I48.91 ATRIAL FIBRILLATION, UNSPECIFIED TYPE (HCC): ICD-10-CM

## 2021-12-13 DIAGNOSIS — I25.5 ISCHEMIC CARDIOMYOPATHY: ICD-10-CM

## 2021-12-13 DIAGNOSIS — F17.210 CIGARETTE NICOTINE DEPENDENCE WITHOUT COMPLICATION: ICD-10-CM

## 2021-12-13 DIAGNOSIS — I50.22 CHF (CONGESTIVE HEART FAILURE), NYHA CLASS II, CHRONIC, SYSTOLIC (HCC): Primary | ICD-10-CM

## 2021-12-13 PROCEDURE — 99214 OFFICE O/P EST MOD 30 MIN: CPT | Performed by: NURSE PRACTITIONER

## 2021-12-13 PROCEDURE — 1036F TOBACCO NON-USER: CPT | Performed by: NURSE PRACTITIONER

## 2021-12-13 PROCEDURE — G8484 FLU IMMUNIZE NO ADMIN: HCPCS | Performed by: NURSE PRACTITIONER

## 2021-12-13 PROCEDURE — G8427 DOCREV CUR MEDS BY ELIG CLIN: HCPCS | Performed by: NURSE PRACTITIONER

## 2021-12-13 PROCEDURE — G8420 CALC BMI NORM PARAMETERS: HCPCS | Performed by: NURSE PRACTITIONER

## 2021-12-13 PROCEDURE — 3017F COLORECTAL CA SCREEN DOC REV: CPT | Performed by: NURSE PRACTITIONER

## 2021-12-13 PROCEDURE — 93000 ELECTROCARDIOGRAM COMPLETE: CPT | Performed by: NURSE PRACTITIONER

## 2021-12-13 RX ORDER — GUAIFENESIN 1200 MG/1
TABLET, EXTENDED RELEASE ORAL
COMMUNITY
Start: 2021-12-05

## 2021-12-13 RX ORDER — POTASSIUM CHLORIDE 1500 MG/1
20 TABLET, FILM COATED, EXTENDED RELEASE ORAL DAILY
Qty: 30 TABLET | Refills: 5 | Status: SHIPPED | OUTPATIENT
Start: 2021-12-13 | End: 2021-12-22 | Stop reason: ALTCHOICE

## 2021-12-13 RX ORDER — SPIRONOLACTONE 25 MG/1
25 TABLET ORAL DAILY
Qty: 30 TABLET | Refills: 5 | Status: SHIPPED | OUTPATIENT
Start: 2021-12-13 | End: 2022-03-10 | Stop reason: SDUPTHER

## 2021-12-13 RX ORDER — SILDENAFIL 50 MG/1
TABLET, FILM COATED ORAL
COMMUNITY
Start: 2021-09-09

## 2021-12-13 RX ORDER — BUMETANIDE 2 MG/1
2 TABLET ORAL DAILY
Qty: 30 TABLET | Refills: 5 | Status: SHIPPED | OUTPATIENT
Start: 2021-12-13 | End: 2022-07-21

## 2021-12-13 ASSESSMENT — ENCOUNTER SYMPTOMS
ABDOMINAL DISTENTION: 0
COUGH: 0
SHORTNESS OF BREATH: 0

## 2021-12-13 NOTE — PATIENT INSTRUCTIONS
You may receive a survey regarding the care you received during your visit. Your input is valuable to us. We encourage you to complete and return your survey. We hope you will choose us in the future for your healthcare needs.     Continue:  · Continue current medications  · Daily weights and record  · Fluid restriction of 2 Liters per day  · Limit sodium in diet to around 7584-0811 mg/day  · Monitor BP  · Activity as tolerated     Call the Heart Failure Clinic for any of the following symptoms: [phone]   Weight gain of 2-3 pounds in 1 day or 5 pounds in 1 week   Increased shortness of breath   Shortness of breath while laying down   Cough   Chest pain   Swelling in feet, ankles or legs   Tenderness or bloating in the abdomen   Fatigue    Decreased appetite or nausea    Confusion   

## 2021-12-13 NOTE — TELEPHONE ENCOUNTER
VO from LADY Harkins:  Only start Aldactone. Come see CHF nurse next week when here for Dr Vangie Madera to go over medications    Patient notified and verbalized understanding.

## 2021-12-13 NOTE — TELEPHONE ENCOUNTER
Call and let him know meds are covered.   Stop Losartan start Entresto  Get blood work next week when here seeing Dm  If stable will plan to start [address] (and decrease Bumex further)

## 2021-12-13 NOTE — TELEPHONE ENCOUNTER
Called pharmacy regarding cost of medications. Farxiga $0 copay  Spironolactone $0 copay  Entresto needs PA, awaiting fax.     Patient notified, awaiting Entresto cost

## 2021-12-21 LAB
ALBUMIN SERPL-MCNC: 4.5 G/DL
ALP BLD-CCNC: 88 U/L
ALT SERPL-CCNC: 24 U/L
ANION GAP SERPL CALCULATED.3IONS-SCNC: NORMAL MMOL/L
AST SERPL-CCNC: 26 U/L
BASOPHILS ABSOLUTE: 0 /ΜL
BASOPHILS RELATIVE PERCENT: 0.5 %
BILIRUB SERPL-MCNC: 0.9 MG/DL (ref 0.1–1.4)
BUN BLDV-MCNC: 15 MG/DL
CALCIUM SERPL-MCNC: 9.3 MG/DL
CHLORIDE BLD-SCNC: 98 MMOL/L
CHOLESTEROL, TOTAL: 130 MG/DL
CHOLESTEROL/HDL RATIO: ABNORMAL
CO2: 31 MMOL/L
CREAT SERPL-MCNC: 0.8 MG/DL
EOSINOPHILS ABSOLUTE: 0.2 /ΜL
EOSINOPHILS RELATIVE PERCENT: 1.7 %
GFR CALCULATED: >=90
GLUCOSE BLD-MCNC: 97 MG/DL
HCT VFR BLD CALC: 42.7 % (ref 41–53)
HDLC SERPL-MCNC: 69 MG/DL (ref 35–70)
HEMOGLOBIN: 14.7 G/DL (ref 13.5–17.5)
LDL CHOLESTEROL CALCULATED: 51 MG/DL (ref 0–160)
LYMPHOCYTES ABSOLUTE: 1.7 /ΜL
LYMPHOCYTES RELATIVE PERCENT: 18 %
MAGNESIUM: 2.1 MG/DL
MCH RBC QN AUTO: 33.3 PG
MCHC RBC AUTO-ENTMCNC: 34.4 G/DL
MCV RBC AUTO: 96.8 FL
MONOCYTES ABSOLUTE: 1.6 /ΜL
MONOCYTES RELATIVE PERCENT: 16.9 %
NEUTROPHILS ABSOLUTE: 5.9 /ΜL
NEUTROPHILS RELATIVE PERCENT: 62.3 %
NONHDLC SERPL-MCNC: ABNORMAL MG/DL
PLATELET # BLD: 212 K/ΜL
PMV BLD AUTO: 10.1 FL
POTASSIUM SERPL-SCNC: 4.4 MMOL/L
RBC # BLD: 4.41 10^6/ΜL
SODIUM BLD-SCNC: 138 MMOL/L
TOTAL PROTEIN: 7.6
TRIGL SERPL-MCNC: 48 MG/DL
VLDLC SERPL CALC-MCNC: 10 MG/DL
WBC # BLD: 9.4 10^3/ML

## 2021-12-22 ENCOUNTER — OFFICE VISIT (OUTPATIENT)
Dept: CARDIOLOGY CLINIC | Age: 61
End: 2021-12-22

## 2021-12-22 ENCOUNTER — OFFICE VISIT (OUTPATIENT)
Dept: CARDIOLOGY CLINIC | Age: 61
End: 2021-12-22
Payer: COMMERCIAL

## 2021-12-22 VITALS
SYSTOLIC BLOOD PRESSURE: 97 MMHG | HEART RATE: 85 BPM | WEIGHT: 141.8 LBS | HEIGHT: 72 IN | BODY MASS INDEX: 19.21 KG/M2 | DIASTOLIC BLOOD PRESSURE: 63 MMHG

## 2021-12-22 VITALS
SYSTOLIC BLOOD PRESSURE: 97 MMHG | DIASTOLIC BLOOD PRESSURE: 63 MMHG | WEIGHT: 141 LBS | BODY MASS INDEX: 19.1 KG/M2 | HEIGHT: 72 IN | HEART RATE: 85 BPM

## 2021-12-22 DIAGNOSIS — I50.22 CHF (CONGESTIVE HEART FAILURE), NYHA CLASS II, CHRONIC, SYSTOLIC (HCC): Primary | ICD-10-CM

## 2021-12-22 DIAGNOSIS — I25.5 ISCHEMIC CARDIOMYOPATHY: ICD-10-CM

## 2021-12-22 DIAGNOSIS — I42.0 DILATED CARDIOMYOPATHY (HCC): ICD-10-CM

## 2021-12-22 DIAGNOSIS — I48.21 PERMANENT ATRIAL FIBRILLATION (HCC): ICD-10-CM

## 2021-12-22 DIAGNOSIS — I25.10 CAD IN NATIVE ARTERY: ICD-10-CM

## 2021-12-22 PROCEDURE — 3017F COLORECTAL CA SCREEN DOC REV: CPT | Performed by: INTERNAL MEDICINE

## 2021-12-22 PROCEDURE — G8484 FLU IMMUNIZE NO ADMIN: HCPCS | Performed by: INTERNAL MEDICINE

## 2021-12-22 PROCEDURE — 1036F TOBACCO NON-USER: CPT | Performed by: INTERNAL MEDICINE

## 2021-12-22 PROCEDURE — 93000 ELECTROCARDIOGRAM COMPLETE: CPT | Performed by: INTERNAL MEDICINE

## 2021-12-22 PROCEDURE — G8427 DOCREV CUR MEDS BY ELIG CLIN: HCPCS | Performed by: INTERNAL MEDICINE

## 2021-12-22 PROCEDURE — G8420 CALC BMI NORM PARAMETERS: HCPCS | Performed by: INTERNAL MEDICINE

## 2021-12-22 PROCEDURE — 99204 OFFICE O/P NEW MOD 45 MIN: CPT | Performed by: INTERNAL MEDICINE

## 2021-12-22 PROCEDURE — 99999 PR OFFICE/OUTPT VISIT,PROCEDURE ONLY: CPT | Performed by: NURSE PRACTITIONER

## 2021-12-22 NOTE — PROGRESS NOTES
[address] [facility] ()  [address] [facility] [address]  Dept: [phone]    CARDIAC ELECTROPHYSIOLOGY: CONSULTATION NOTE  PATIENT DEMOGRAPHICS:  Date:   12/22/2021  Patient name:              Vanessa Mcdaniel  YOB: 1960  Sex: male   MRN:   [id number]    PRIMARY CARE PHYSICIAN:   Abby Coronel DO    REFERRING PHYSICIAN:  Colten Ramsey MD    REASON FOR CONSULTATION:  Ischemic cardiomyopathy and atrial fibrillation. HISTORY OF PRESENT ILLNESS:  61/M was apparently all right when in October 2021 when he started to experience progressive shortness of breath and bilateral lower extremity swelling. He had difficulty in putting on his shoes. He was evaluated in a local hospital at [facility]. Ejection fraction at that time was noted to be around 15%. He was also noted to be in atrial fibrillation, apparently new for the patient. He underwent YANDEL guided electrocardioversion with recurrences. Due to persistent symptoms he underwent cardiac catheterization at [facility] and was noted to have severe lesion of LAD which was stented. He was started appropriate heart failure medications and anticoagulation. He has been wearing LifeVest since then. He was referred here for further management. Patient complains of fatigue with physical exertion. Can walk 2-3 blocks without much difficulty. Denies chest pain, orthopnea, proximal nocturnal dyspnea, lower extremity swelling, syncope or therapies from the LifeVest.  Denies smoking. However he used to drink significant amount of alcohol (12 beers per day). He has cut down. Medical Hx: PeAF dx 11/2021 s/p DCCV 11/10/2021), ICM (LVEF 25%), CAD/PCI-mLAD (12/6/2021), HTN., heavy alcohol consumption and anxiety. REVIEW OF SYSTEMS:    Constitutional: Negative for chills and fever  HENT: Negative for congestion, sinus pressure, sneezing and sore throat. Eyes: Negative for pain, discharge, redness and itching. Respiratory: Negative for apnea, cough  Gastrointestinal: Negative for blood in stool, constipation, diarrhea   Endocrine: Negative for cold intolerance, heat intolerance, polydipsia. Genitourinary: Negative for dysuria, enuresis, flank pain and hematuria. Musculoskeletal: Negative for arthralgias, joint swelling and neck pain. Neurological: Negative for numbness and headaches. Psychiatric/Behavioral: Negative for agitation, confusion, decreased concentration and dysphoric mood. PAST MEDICAL HISTORY:  Past Medical History:   Diagnosis Date    Anxiety     Arthritis     [facility]     CAD (coronary artery disease)     atrial fib    Cardiomyopathy ([facility]     Hypertension        PSH:  Past Surgical History:   Procedure Laterality Date    CARDIOVERSION      CARPAL TUNNEL RELEASE Right     ROTATOR CUFF REPAIR      TONSILLECTOMY         FAMILY HISTORY:  No family history on file. SOCIAL HISTORY:  Social History     Socioeconomic History    Marital status:      Spouse name: None    Number of children: 11    Years of education: None    Highest education level: None   Occupational History    None   Tobacco Use    Smoking status: Former Smoker     Types: Cigarettes     Quit date: 10/24/2021     Years since quittin.1    Smokeless tobacco: Never Used   Substance and Sexual Activity    Alcohol use: Yes     Alcohol/week: 12.0 standard drinks     Types: 12 Cans of beer per week    Drug use: Not Currently    Sexual activity: None   Other Topics Concern    None   Social History Narrative    None     Social Determinants of Health     Financial Resource Strain:     Difficulty of Paying Living Expenses: Not on file   Food Insecurity:     Worried About Running Out of Food in the Last Year: Not on file    Kenneth of Food in the Last Year: Not on file   Transportation Needs:     Lack of Transportation (Medical): Not on file    Lack of Transportation (Non-Medical):  Not on file   Physical Activity:     Days of Exercise per Week: Not on file    Minutes of Exercise per Session: Not on file   Stress:     Feeling of Stress : Not on file   Social Connections:     Frequency of Communication with Friends and Family: Not on file    Frequency of Social Gatherings with Friends and Family: Not on file    Attends Orthodoxy Services: Not on file    Active Member of [address] [place] [address] or Organizations: Not on file    Attends Club or Organization Meetings: Not on file    Marital Status: Not on file   Intimate Partner Violence:     Fear of Current or Ex-Partner: Not on file    Emotionally Abused: Not on file    Physically Abused: Not on file    Sexually Abused: Not on file   Housing Stability:     Unable to Pay for Housing in the Last Year: Not on file    Number of [place] in the Last Year: Not on file    Unstable Housing in the Last Year: Not on file        ALLERGY HISTORY:  No Known Allergies     MEDICATIONS:  Current Outpatient Medications   Medication Sig Dispense Refill    NICOTINE MINI 2 MG lozenge       sildenafil (VIAGRA) 50 MG tablet Take by mouth      sacubitril-valsartan (ENTRESTO) 24-26 MG per tablet Take 1 tablet by mouth 2 times daily 60 tablet 5    spironolactone (ALDACTONE) 25 MG tablet Take 1 tablet by mouth daily 30 tablet 5    potassium chloride (KLOR-CON M) 20 MEQ TBCR extended release tablet Take 1 tablet by mouth daily 30 tablet 5    bumetanide (BUMEX) 2 MG tablet Take 1 tablet by mouth daily 30 tablet 5    dapagliflozin (FARXIGA) 10 MG tablet Take 1 tablet by mouth every morning 30 tablet 5    aspirin 81 MG EC tablet Take 1 tablet by mouth daily 30 tablet 3    nitroGLYCERIN (NITROSTAT) 0.4 MG SL tablet up to max of 3 total doses.  If no relief after 1 dose, call 911. 25 tablet 3    rosuvastatin (CRESTOR) 40 MG tablet Take 1 tablet by mouth nightly 30 tablet 3    clopidogrel (PLAVIX) 75 MG tablet Take 1 tablet by mouth daily 30 tablet 3    digoxin (LANOXIN) 125 MCG tablet Take 1 tablet by mouth daily 30 tablet 3    diazePAM (VALIUM) 5 MG tablet Take 2.5 mg by mouth 2 times daily as needed for Anxiety.  Cholecalciferol (D3 VITAMIN PO) Take 1 capsule by mouth daily      metoprolol succinate (TOPROL XL) 100 MG extended release tablet Take 1 tablet by mouth daily 30 tablet 3    lansoprazole (PREVACID) 15 MG delayed release capsule Take 15 mg by mouth daily      losartan (COZAAR) 25 MG tablet Take 1 tablet by mouth daily 30 tablet 3    apixaban (ELIQUIS) 5 MG TABS tablet Take 5 mg by mouth 2 times daily        No current facility-administered medications for this visit. PHYSICAL EXAM:  Vitals:    12/22/21 1024   BP: 97/63   Pulse: 85     Body mass index is 19.23 kg/m². GENERAL: Alert and oriented. No distress. EYES: No pallor or icterus. ENT: No cyanosis. No thyromegaly or cervical LAP. VESSELS: No jugular venous distension or carotid bruits. HEART: Normal S1/S2. No murmur, rub or gallop. LUNGS: Clear to auscultation. ABDOMEN: Soft and non-tender. CHEST Wearing LifeVest.  EXTREMITIES: No lower extremity edema. Feet are warm. NEUROLOGICAL: Grossly normal.     LABORATORY DATA AND DIAGNOSTIC DATA:  I have personally reviewed and interpreted the results of the following diagnostic testing    Lab Results   Component Value Date    WBC 8.4 12/07/2021    HGB 14.5 12/07/2021    HCT 42.3 12/07/2021     12/07/2021    CHOL 173 12/06/2021    TRIG 72 12/06/2021    HDL 66 12/06/2021     12/07/2021    K 4.7 12/07/2021     12/07/2021    CREATININE 0.8 12/07/2021    BUN 15 12/07/2021    CO2 21 (L) 12/07/2021    INR 0.88 12/06/2021      Echocardiogram (YANDEL) 11/10/2021: LVEF 25%. Mild mitral regurgitation.  ECG 12/13/2021: Atrial fibrillation, controlled ventricular rate and narrow QRS. ECG 12/22/2021: Atrial fibrillation with controlled ventricular rate.  Coronary angiogram 12/6/2021: PCI to mid LAD.       IMPRESSION:  · PeAF s/p DCCV with recurrences. [id number] score 3 (HTN, CHF and CAD). · ICM, LVEF 25%, NYHA III. · CAD/PCI-mLAD (12/6/2021). · HTN, controlled. · LifeVest, no therapies. ASSESSMENT AND RECOMMENDATIONS:  Discussed management options for atrial fibrillation with the patient. Having cardiomyopathy, he will be benefited by rhythm control. He otherwise does not report any palpitations. We will request liver function and thyroid function panel and if normal start him on amiodarone load followed by maintenance therapy followed by cardioversion in 2 weeks. The side effects and the benefits of the medication were discussed with the patient in detail. He will continue following with the heart failure clinic to optimize his heart failure medications. If his LVEF fails to improve beyond 35% 3 months after PCI while on optimal medical therapy he will be benefited by ICD implantation for primary prevention of sudden cardiac death. Continue with the LifeVest for the time being. **This report has been created using voice recognition software. It may contain minor errors which are inherent in voice recognition technology. **       Electronically signed by Lyle Munguia MD, [facility], Jadine Frankel on 12/22/2021 at 10:32 AM

## 2021-12-28 NOTE — TELEPHONE ENCOUNTER
ASSESSMENT AND RECOMMENDATIONS:  Discussed management options for atrial fibrillation with the patient. Having cardiomyopathy, he will be benefited by rhythm control. He otherwise does not report any palpitations. We will request liver function and thyroid function panel and if normal start him on amiodarone load followed by maintenance therapy followed by cardioversion in 2 weeks. The side effects and the benefits of the medication were discussed with the patient in detail. He will continue following with the heart failure clinic to optimize his heart failure medications. If his LVEF fails to improve beyond 35% 3 months after PCI while on optimal medical therapy he will be benefited by ICD implantation for primary prevention of sudden cardiac death. Continue with the LifeVest for the time being.     **This report has been created using voice recognition software. It may contain minor errors which are inherent in voice recognition technology. **         Electronically signed by Viola Ferrer MD, [facility], Michele Snellen on 12/22/2021 at 10:32 AM     Patient did not get the Labs done-  He thought it was already done at Dr. Kelle Cruz office.  -  These labs are not the correct labs. We need the TSH and Hepatic panel. Please follow up with the labs-    Order was faxed to Dr. Kelle Cruz office   Marshaarthur Mari DO  [address] [place] [address] ?[id number]  Phone: [phone]  Fax: [phone]    Patient was notified to [facility]!!! Based on results -  Patient will need script for amiodarone. I already have the order for the cardioversion.

## 2021-12-29 LAB
A/G RATIO: NORMAL
ALBUMIN SERPL-MCNC: 4 G/DL
ALP BLD-CCNC: 89 U/L
ALT SERPL-CCNC: 19 U/L
AST SERPL-CCNC: 25 U/L
BILIRUB SERPL-MCNC: 0.6 MG/DL (ref 0.1–1.4)
BILIRUBIN DIRECT: 0.2 MG/DL
BILIRUBIN, INDIRECT: NORMAL
GLOBULIN: NORMAL
PROTEIN TOTAL: 7 G/DL
TSH SERPL DL<=0.05 MIU/L-ACNC: 0.97 UIU/ML

## 2021-12-29 NOTE — TELEPHONE ENCOUNTER
Labs refaxed to [facility] EMERGENCY [facility] office, Willy Soto called and aware to get labwork done. Will call office back, needs an Rx filled not sure which med.

## 2021-12-30 NOTE — TELEPHONE ENCOUNTER
? Start on amio? Dose, please       ASSESSMENT AND RECOMMENDATIONS:  Discussed management options for atrial fibrillation with the patient. Having cardiomyopathy, he will be benefited by rhythm control. He otherwise does not report any palpitations. We will request liver function and thyroid function panel and if normal start him on amiodarone load followed by maintenance therapy followed by cardioversion in 2 weeks. The side effects and the benefits of the medication were discussed with the patient in detail. He will continue following with the heart failure clinic to optimize his heart failure medications. If his LVEF fails to improve beyond 35% 3 months after PCI while on optimal medical therapy he will be benefited by ICD implantation for primary prevention of sudden cardiac death. Continue with the LifeVest for the time being.     **This report has been created using voice recognition software. It may contain minor errors which are inherent in voice recognition technology. **         Electronically signed by Yandel Mackey MD, [facility], Freida Hooker on 12/22/2021 at 10:32 AM

## 2022-01-03 RX ORDER — ASPIRIN 81 MG/1
81 TABLET ORAL DAILY
Qty: 90 TABLET | Refills: 3 | Status: SHIPPED | OUTPATIENT
Start: 2022-01-03 | End: 2022-02-24 | Stop reason: ALTCHOICE

## 2022-01-03 RX ORDER — DIGOXIN 125 MCG
125 TABLET ORAL DAILY
Qty: 90 TABLET | Refills: 3 | Status: SHIPPED | OUTPATIENT
Start: 2022-01-03

## 2022-01-03 RX ORDER — CLOPIDOGREL BISULFATE 75 MG/1
75 TABLET ORAL DAILY
Qty: 90 TABLET | Refills: 3 | Status: SHIPPED | OUTPATIENT
Start: 2022-01-03

## 2022-01-04 ENCOUNTER — TELEPHONE (OUTPATIENT)
Dept: CARDIOLOGY CLINIC | Age: 62
End: 2022-01-04

## 2022-01-04 RX ORDER — AMIODARONE HYDROCHLORIDE 200 MG/1
TABLET ORAL
Qty: 56 TABLET | Refills: 0 | Status: SHIPPED | OUTPATIENT
Start: 2022-01-04 | End: 2022-01-26

## 2022-01-04 NOTE — TELEPHONE ENCOUNTER
Erica, amiodarone started 1/4/22    Can you please schedule the cardioversion? Note says you have orders    ASSESSMENT AND RECOMMENDATIONS:  Discussed management options for atrial fibrillation with the patient. Lynsey Lasso cardiomyopathy, he will be benefited by rhythm control.  He otherwise does not report any palpitations.  We will request liver function and thyroid function panel and if normal start him on amiodarone load followed by maintenance therapy followed by cardioversion in 2 weeks.  The side effects and the benefits of the medication were discussed with the patient in detail. Sofia Friedman will continue following with the heart failure clinic to optimize his heart failure medications.  If his LVEF fails to improve beyond 35% 3 months after PCI while on optimal medical therapy he will be benefited by ICD implantation for primary prevention of sudden cardiac death.  Continue with the LifeVest for the time being.

## 2022-01-05 RX ORDER — ROSUVASTATIN CALCIUM 40 MG/1
40 TABLET, COATED ORAL NIGHTLY
Qty: 90 TABLET | Refills: 5 | Status: SHIPPED | OUTPATIENT
Start: 2022-01-05

## 2022-01-06 NOTE — TELEPHONE ENCOUNTER
Procedure: Cardioversion with Anesthesia  Date: 01.20.22  Arrival Time: 9am  Meds to Hold: none      Instructions verbalized to the patient. Instructions mailed to the patient.      Patient started Amiodarone 01.04.2022  Patient is taking Eliquis

## 2022-01-18 NOTE — TELEPHONE ENCOUNTER
Kiko De La Cruz called into the office today -  Had questions as to when he was supposed to stop taking the amiodarone tid.      We discussed that patient started taking the amiodarone tid on 01.05.2022 so he would take tid on 01.19.2022 and on 01.20.2022 start qd    Patient was scheduled for cardioversion on 02.03.2022 arrival time 9am

## 2022-01-26 RX ORDER — AMIODARONE HYDROCHLORIDE 200 MG/1
TABLET ORAL
Qty: 90 TABLET | Refills: 1 | Status: SHIPPED | OUTPATIENT
Start: 2022-01-26 | End: 2022-04-18

## 2022-02-01 NOTE — TELEPHONE ENCOUNTER
Due to incoming weather-  The cardioversion has been moved to Friday 02.04.2022 arrival time 8am   Spoke with Nacho Dumont at [address] [place] [address] for Anesthesia.    Spoke with Kanika Zamora at [facility] with Patient,     Patient verbalized understanding

## 2022-02-02 ENCOUNTER — TELEPHONE (OUTPATIENT)
Dept: CARDIOLOGY CLINIC | Age: 62
End: 2022-02-02

## 2022-02-02 ENCOUNTER — PREP FOR PROCEDURE (OUTPATIENT)
Dept: CARDIOLOGY | Age: 62
End: 2022-02-02

## 2022-02-02 RX ORDER — SODIUM CHLORIDE 9 MG/ML
25 INJECTION, SOLUTION INTRAVENOUS PRN
Status: CANCELLED | OUTPATIENT
Start: 2022-02-02

## 2022-02-02 RX ORDER — SODIUM CHLORIDE 0.9 % (FLUSH) 0.9 %
5-40 SYRINGE (ML) INJECTION PRN
Status: CANCELLED | OUTPATIENT
Start: 2022-02-02

## 2022-02-02 RX ORDER — SODIUM CHLORIDE 9 MG/ML
INJECTION, SOLUTION INTRAVENOUS CONTINUOUS
Status: CANCELLED | OUTPATIENT
Start: 2022-02-02

## 2022-02-02 RX ORDER — SODIUM CHLORIDE 0.9 % (FLUSH) 0.9 %
5-40 SYRINGE (ML) INJECTION EVERY 12 HOURS SCHEDULED
Status: CANCELLED | OUTPATIENT
Start: 2022-02-02

## 2022-02-02 NOTE — TELEPHONE ENCOUNTER
LMOM asking for a return call -  Is the patient still planning to come in on 02.04.2022 for the cardioversion?     If he wants to reschedule-  Per Anirudh Marie at cath lab- there is time available on 02.07.2022  This will also need anesthesia at 9030-  I believe that they are only available in the afternoon on 02.07.2022

## 2022-02-04 ENCOUNTER — TELEPHONE (OUTPATIENT)
Dept: CARDIOLOGY CLINIC | Age: 62
End: 2022-02-04

## 2022-02-07 NOTE — TELEPHONE ENCOUNTER
Angelica TALLEY    9:38 AM  Note  Patient is rescheduled to  02.08.2022 arrival time 900am   Patient has been notified

## 2022-02-08 ENCOUNTER — HOSPITAL ENCOUNTER (OUTPATIENT)
Dept: INPATIENT UNIT | Age: 62
Discharge: HOME | End: 2022-02-08
Attending: INTERNAL MEDICINE | Admitting: INTERNAL MEDICINE
Payer: COMMERCIAL

## 2022-02-08 ENCOUNTER — ANESTHESIA EVENT (OUTPATIENT)
Dept: CARDIAC CATH/INVASIVE PROCEDURES | Age: 62
End: 2022-02-08
Payer: COMMERCIAL

## 2022-02-08 ENCOUNTER — ANESTHESIA (OUTPATIENT)
Dept: CARDIAC CATH/INVASIVE PROCEDURES | Age: 62
End: 2022-02-08
Payer: COMMERCIAL

## 2022-02-08 ENCOUNTER — TELEPHONE (OUTPATIENT)
Dept: CARDIOLOGY CLINIC | Age: 62
End: 2022-02-08

## 2022-02-08 VITALS
DIASTOLIC BLOOD PRESSURE: 70 MMHG | HEART RATE: 71 BPM | SYSTOLIC BLOOD PRESSURE: 108 MMHG | TEMPERATURE: 97.7 F | BODY MASS INDEX: 20.16 KG/M2 | OXYGEN SATURATION: 97 % | RESPIRATION RATE: 27 BRPM | WEIGHT: 144 LBS | HEIGHT: 71 IN

## 2022-02-08 DIAGNOSIS — I48.91 ATRIAL FIBRILLATION, UNSPECIFIED TYPE (HCC): Primary | ICD-10-CM

## 2022-02-08 LAB
ANION GAP SERPL CALCULATED.3IONS-SCNC: 11 MEQ/L (ref 8–16)
BUN BLDV-MCNC: 13 MG/DL (ref 7–22)
CALCIUM SERPL-MCNC: 8.7 MG/DL (ref 8.5–10.5)
CHLORIDE BLD-SCNC: 98 MEQ/L (ref 98–111)
CO2: 25 MEQ/L (ref 23–33)
CREAT SERPL-MCNC: 0.9 MG/DL (ref 0.4–1.2)
EKG ATRIAL RATE: 70 BPM
EKG P AXIS: 69 DEGREES
EKG P-R INTERVAL: 190 MS
EKG Q-T INTERVAL: 382 MS
EKG Q-T INTERVAL: 400 MS
EKG QRS DURATION: 76 MS
EKG QRS DURATION: 88 MS
EKG QTC CALCULATION (BAZETT): 426 MS
EKG QTC CALCULATION (BAZETT): 432 MS
EKG R AXIS: 62 DEGREES
EKG R AXIS: 64 DEGREES
EKG T AXIS: 81 DEGREES
EKG VENTRICULAR RATE: 70 BPM
EKG VENTRICULAR RATE: 75 BPM
ERYTHROCYTE [DISTWIDTH] IN BLOOD BY AUTOMATED COUNT: 14.1 % (ref 11.5–14.5)
ERYTHROCYTE [DISTWIDTH] IN BLOOD BY AUTOMATED COUNT: 52.9 FL (ref 35–45)
GFR SERPL CREATININE-BSD FRML MDRD: 85 ML/MIN/1.73M2
GLUCOSE BLD-MCNC: 100 MG/DL (ref 70–108)
HCT VFR BLD CALC: 42.4 % (ref 42–52)
HEMOGLOBIN: 14.1 GM/DL (ref 14–18)
MCH RBC QN AUTO: 34.2 PG (ref 26–33)
MCHC RBC AUTO-ENTMCNC: 33.3 GM/DL (ref 32.2–35.5)
MCV RBC AUTO: 102.9 FL (ref 80–94)
PLATELET # BLD: 171 THOU/MM3 (ref 130–400)
PMV BLD AUTO: 10.3 FL (ref 9.4–12.4)
POTASSIUM SERPL-SCNC: 4.4 MEQ/L (ref 3.5–5.2)
RBC # BLD: 4.12 MILL/MM3 (ref 4.7–6.1)
SODIUM BLD-SCNC: 134 MEQ/L (ref 135–145)
WBC # BLD: 7 THOU/MM3 (ref 4.8–10.8)

## 2022-02-08 PROCEDURE — 6360000002 HC RX W HCPCS

## 2022-02-08 PROCEDURE — 93010 ELECTROCARDIOGRAM REPORT: CPT | Performed by: NUCLEAR MEDICINE

## 2022-02-08 PROCEDURE — 92960 CARDIOVERSION ELECTRIC EXT: CPT | Performed by: INTERNAL MEDICINE

## 2022-02-08 PROCEDURE — 92960 CARDIOVERSION ELECTRIC EXT: CPT

## 2022-02-08 PROCEDURE — 80048 BASIC METABOLIC PNL TOTAL CA: CPT

## 2022-02-08 PROCEDURE — 2580000003 HC RX 258: Performed by: PHYSICIAN ASSISTANT

## 2022-02-08 PROCEDURE — 36415 COLL VENOUS BLD VENIPUNCTURE: CPT

## 2022-02-08 PROCEDURE — 93005 ELECTROCARDIOGRAM TRACING: CPT | Performed by: PHYSICIAN ASSISTANT

## 2022-02-08 PROCEDURE — 2500000003 HC RX 250 WO HCPCS

## 2022-02-08 PROCEDURE — 85027 COMPLETE CBC AUTOMATED: CPT

## 2022-02-08 PROCEDURE — 93005 ELECTROCARDIOGRAM TRACING: CPT | Performed by: INTERNAL MEDICINE

## 2022-02-08 RX ORDER — KETAMINE HYDROCHLORIDE 50 MG/ML
INJECTION, SOLUTION, CONCENTRATE INTRAMUSCULAR; INTRAVENOUS PRN
Status: DISCONTINUED | OUTPATIENT
Start: 2022-02-08 | End: 2022-02-08 | Stop reason: SDUPTHER

## 2022-02-08 RX ORDER — SODIUM CHLORIDE 9 MG/ML
25 INJECTION, SOLUTION INTRAVENOUS PRN
Status: DISCONTINUED | OUTPATIENT
Start: 2022-02-08 | End: 2022-02-08 | Stop reason: HOSPADM

## 2022-02-08 RX ORDER — SODIUM CHLORIDE 0.9 % (FLUSH) 0.9 %
5-40 SYRINGE (ML) INJECTION EVERY 12 HOURS SCHEDULED
Status: DISCONTINUED | OUTPATIENT
Start: 2022-02-08 | End: 2022-02-08 | Stop reason: HOSPADM

## 2022-02-08 RX ORDER — SODIUM CHLORIDE 0.9 % (FLUSH) 0.9 %
5-40 SYRINGE (ML) INJECTION PRN
Status: DISCONTINUED | OUTPATIENT
Start: 2022-02-08 | End: 2022-02-08 | Stop reason: HOSPADM

## 2022-02-08 RX ORDER — SODIUM CHLORIDE 9 MG/ML
INJECTION, SOLUTION INTRAVENOUS CONTINUOUS
Status: DISCONTINUED | OUTPATIENT
Start: 2022-02-08 | End: 2022-02-08 | Stop reason: HOSPADM

## 2022-02-08 RX ADMIN — PROPOFOL 20 MG: 10 INJECTION, EMULSION INTRAVENOUS at 11:49

## 2022-02-08 RX ADMIN — PROPOFOL 10 MG: 10 INJECTION, EMULSION INTRAVENOUS at 11:50

## 2022-02-08 RX ADMIN — KETAMINE HYDROCHLORIDE 20 MG: 50 INJECTION, SOLUTION INTRAMUSCULAR; INTRAVENOUS at 11:48

## 2022-02-08 RX ADMIN — SODIUM CHLORIDE: 9 INJECTION, SOLUTION INTRAVENOUS at 10:18

## 2022-02-08 RX ADMIN — PROPOFOL 30 MG: 10 INJECTION, EMULSION INTRAVENOUS at 11:48

## 2022-02-08 ASSESSMENT — LIFESTYLE VARIABLES: SMOKING_STATUS: 1

## 2022-02-08 NOTE — FLOWSHEET NOTE
02/08/22 0910   Fall Risk Interventions   Hourly Visual Checks Awake   Patient arrived to room. Admission process started.

## 2022-02-08 NOTE — PROCEDURES
[address] [address]  Dept: [phone]  Loc: [phone]   CARDIAC ELECTROPHYSIOLOGY: PROCEDURE NOTE    Patients Demographics:  Date:   2/8/2022  Patient name:              Colt Vásquez  YOB: 1960  Sex: male   MRN:   [id number]    Primary Care Provider:   Lori Rankin DO     Cardiologist:  Mendy Garcia MD    Procedure Planned:  Direct Current Electro-cardioversion . Indication/s of the Procedure:  Atrial fibrillation and congestive heart failure. Procedure/s Performed:   Direct Current Electro-cardioversion . Description of the Procedure: The risks and benefits of DCCV were discussed with the patient. Informed consent was obtained. Uninterrupted anticoagulation with Apixaban was confirmed. Time out performed and patient identified by 2 identifiers. After achieving deep sedation with intravenous Propofol and Ketamine a 50 Joules synchronized electric shock delivered through the hands free defibrillator pads (AP vector) was unsuccessful. A second shock with 100 Joules energy and compression on DF pads converted the rhythm to sinus. No significant post- conversion pauses or bradycardia. At baseline heart rate during atrial fibrillation was 90 beats per minute and blood pressure 110/70 mmHg. Post cardioversion, in sinus rhythm, heart rate was 72 beats per minute and blood pressure 100/64 mmHg. The patient's hemodynamics including heart rate, blood pressure and oxygen saturation were monitored throughout the procedure. Arousal from sedation was spontaneous and uncomplicated. He tolerated the procedure well. Medications:   Ketamine 20 mg intravenously.  Propofol 70 mg ntravenously. Complications:  None. Summary:  Successful cardioversion of atrial fibrillation to sinus rhythm     Recommendations:    Continue antiarrhythmic medications and anticoagulation.     Discharge and post procedure care per protocol.  Follow up 4 weeks in EP clinic with a limited echo.          Electronically signed by Mark Lugo MD, Mo Lynn on 2/8/2022 at 11:59 AM

## 2022-02-08 NOTE — ANESTHESIA PRE PROCEDURE
Department of Anesthesiology  Preprocedure Note       Name:  Janee Isabel   Age:  59 y.o.  :  1960                                          MRN:  [id number]         Date:  2022      Surgeon: * No surgeons listed *    Procedure: * No procedures listed *    Medications prior to admission:   Prior to Admission medications    Medication Sig Start Date End Date Taking? Authorizing Provider   amiodarone (CORDARONE) 200 MG tablet TAKE 1 TABLET BY MOUTH THREE TIMES DAILY X 14 DAYS, THEN REDUCE 1 TABLET DAILY 22  Yes Jazmin Giron MD   rosuvastatin (CRESTOR) 40 MG tablet Take 1 tablet by mouth nightly 22  Yes Jazmin Giron MD   aspirin 81 MG EC tablet Take 1 tablet by mouth daily 1/3/22  Yes Jazmin Giron MD   clopidogrel (PLAVIX) 75 MG tablet Take 1 tablet by mouth daily 1/3/22  Yes Jazmin Giron MD   digoxin (LANOXIN) 125 MCG tablet Take 1 tablet by mouth daily 1/3/22  Yes Jazmin Giron MD   apixaban (ELIQUIS) 5 MG TABS tablet Take 1 tablet by mouth 2 times daily 1/3/22  Yes Jazmin Giron MD   NICOTINE MINI 2 MG lozenge  21  Yes Historical Provider, MD   sacubitril-valsartan (ENTRESTO) 24-26 MG per tablet Take 1 tablet by mouth 2 times daily 21  Yes JIA Foreman CNP   spironolactone (ALDACTONE) 25 MG tablet Take 1 tablet by mouth daily 21  Yes JIA Foreman CNP   bumetanide (BUMEX) 2 MG tablet Take 1 tablet by mouth daily 21  Yes JIA Foreman CNP   dapagliflozin (FARXIGA) 10 MG tablet Take 1 tablet by mouth every morning 21  Yes JIA Foreman CNP   diazePAM (VALIUM) 5 MG tablet Take 2.5 mg by mouth 2 times daily as needed for Anxiety.    Yes Historical Provider, MD   Cholecalciferol (D3 VITAMIN PO) Take 1 capsule by mouth daily   Yes Historical Provider, MD   metoprolol succinate (TOPROL XL) 100 MG extended release tablet Take 1 tablet by mouth daily 11/10/21  Yes Jazmin Giron MD   lansoprazole (PREVACID) 15 MG delayed release capsule Take 15 mg by mouth daily   Yes Historical Provider, MD   sildenafil (VIAGRA) 50 MG tablet Take by mouth 21   Historical Provider, MD   nitroGLYCERIN (NITROSTAT) 0.4 MG SL tablet up to max of 3 total doses. If no relief after 1 dose, call 911. 21   Remy Qureshi PA-C       Current medications:    Current Facility-Administered Medications   Medication Dose Route Frequency Provider Last Rate Last Admin    0.9 % sodium chloride infusion   IntraVENous Continuous Johnson CHARLOTTE Segura 75 mL/hr at 22 1018 New Bag at 22 1018    0.9 % sodium chloride infusion  25 mL IntraVENous PRN Johnson CHARL[place]TE Segura        sodium chloride flush 0.9 % injection 5-40 mL  5-40 mL IntraVENous 2 times per day Johnson CHARL[place]TE Segura        sodium chloride flush 0.9 % injection 5-40 mL  5-40 mL IntraVENous PRN Johnson CHARL[place]TE Segura           Allergies:  No Known Allergies    Problem List:    Patient Active Problem List   Diagnosis Code    Leg swelling M79.89    Dilated cardiomyopathy ([facility] I42.0    Permanent atrial fibrillation (HCC) I48.21    Ischemic cardiomyopathy I25.5    CAD in native artery I25.10    Chronic combined systolic and diastolic CHF (congestive heart failure) ([facility] [address] I50.42       Past Medical History:        Diagnosis Date    Anxiety     Arthritis     Atrial fibrillation ([address]     CAD (coronary artery disease)     atrial fib    Cardiomyopathy ([facility] [address] [facility]     Hypertension        Past Surgical History:        Procedure Laterality Date    CARDIOVERSION      CARPAL TUNNEL RELEASE Right     ROTATOR CUFF REPAIR      TONSILLECTOMY         Social History:    Social History     Tobacco Use    Smoking status: Former Smoker     Packs/day: 0.25     Types: Cigarettes     Quit date: 10/24/2021     Years since quittin.2    Smokeless tobacco: Never Used   Substance Use Topics    Alcohol use:  Yes     Alcohol/week: 12.0 standard drinks     Types: 12 Cans of beer per week                                Counseling given: Not Answered      Vital Signs (Current):   Vitals:    02/08/22 0930   BP: (!) 105/58   Pulse: 81   Resp: 18   Temp: 36.9 °C (98.4 °F)   TempSrc: Oral   Weight: 144 lb (65.3 kg)   Height: 5' 11\" (1.803 m)                                              BP Readings from Last 3 Encounters:   02/08/22 (!) 105/58   12/22/21 97/63   12/22/21 97/63       NPO Status:                                                                                 BMI:   Wt Readings from Last 3 Encounters:   02/08/22 144 lb (65.3 kg)   12/22/21 141 lb (64 kg)   12/22/21 141 lb 12.8 oz (64.3 kg)     Body mass index is 20.08 kg/m². CBC:   Lab Results   Component Value Date    WBC 7.0 02/08/2022    RBC 4.12 02/08/2022    HGB 14.1 02/08/2022    HCT 42.4 02/08/2022    .9 02/08/2022     02/08/2022       CMP:   Lab Results   Component Value Date     02/08/2022    K 4.4 02/08/2022    CL 98 02/08/2022    CO2 25 02/08/2022    BUN 13 02/08/2022    CREATININE 0.9 02/08/2022    LABGLOM 85 02/08/2022    GLUCOSE 100 02/08/2022    PROT 7.0 12/29/2021    CALCIUM 8.7 02/08/2022    BILITOT 0.6 12/29/2021    ALKPHOS 89 12/29/2021    AST 25 12/29/2021    ALT 19 12/29/2021       POC Tests: No results for input(s): POCGLU, POCNA, POCK, POCCL, POCBUN, POCHEMO, POCHCT in the last 72 hours.     Coags:   Lab Results   Component Value Date    INR 0.88 12/06/2021    APTT 33.1 12/06/2021       HCG (If Applicable): No results found for: PREGTESTUR, PREGSERUM, HCG, HCGQUANT     ABGs: No results found for: PHART, PO2ART, PII4DHT, YHQ5AAT, BEART, L5MUPWOL     Type & Screen (If Applicable):  Lab Results   Component Value Date    LABRH POS 12/06/2021       Drug/Infectious Status (If Applicable):  No results found for: HIV, HEPCAB    COVID-19 Screening (If Applicable): No results found for: COVID19        Anesthesia Evaluation  Nursing notes reviewed  Airway: Mallampati: II  TM distance: >3 FB Neck ROM: full  Mouth opening: > = 3 FB Dental: normal exam         Pulmonary:   (+) decreased breath sounds,  current smoker          Patient did not smoke on day of surgery. Cardiovascular:  Exercise tolerance: good (>4 METS),   (+) hypertension:, CAD:, CABG/stent:, dysrhythmias: atrial fibrillation, CHF: systolic and diastolic,       ECG reviewed  Rhythm: irregular    Echocardiogram reviewed                  Neuro/Psych:   Negative Neuro/Psych ROS              GI/Hepatic/Renal: Neg GI/Hepatic/Renal ROS            Endo/Other: Negative Endo/Other ROS                    Abdominal:             Vascular: negative vascular ROS. Other Findings:             Anesthesia Plan      MAC     ASA 3       Induction: intravenous. Anesthetic plan and risks discussed with patient. Plan discussed with attending.     Attending anesthesiologist reviewed and agrees with Preprocedure content              Stacy Billy, APRN - [place]   2/8/2022

## 2022-02-08 NOTE — ANESTHESIA POSTPROCEDURE EVALUATION
Department of Anesthesiology  Postprocedure Note    Patient: Jose Denson  MRN: [id number]  YOB: 1960  Date of evaluation: 2/8/2022  Time:  12:00 PM     Procedure Summary     Date: 02/08/22 Room / Location: [facility]    Anesthesia Start: 1144 Anesthesia Stop: 1155    Procedure: STR CARDIOVERSION W/ ANES Diagnosis:     Scheduled Providers:  Responsible Provider: Cele Crain MD    Anesthesia Type: MAC ASA Status: 3          Anesthesia Type: MAC    Queenie Phase I:      Queenie Phase II:      Last vitals: Reviewed and per EMR flowsheets.        Anesthesia Post Evaluation    Patient location during evaluation: bedside  Patient participation: waiting for patient participation  Level of consciousness: awake  Pain score: 0  Nausea & Vomiting: no nausea and no vomiting  Complications: no  Cardiovascular status: blood pressure returned to baseline and hemodynamically stable  Respiratory status: acceptable and nasal cannula  Hydration status: stable

## 2022-02-08 NOTE — PRE SEDATION
Sedation Pre-Procedure Note    Patient Name: Shawn Dumont   YOB: 1960  Room/Bed: -05/005-A  Medical Record Number: [id number]  Date: 2/8/2022   Time: 11:38 AM       Indication:  DCCV    Consent: I have discussed with the patient and/or the patient representative the indication, alternatives, and the possible risks and/or complications of the planned procedure and the anesthesia methods. The patient and/or patient representative appear to understand and agree to proceed. Vital Signs:   Vitals:    02/08/22 0930   BP: (!) 105/58   Pulse: 81   Resp: 18   Temp: 98.4 °F (36.9 °C)       Past Medical History:   has a past medical history of Anxiety, Arthritis, Atrial fibrillation ([address] CAD (coronary artery disease), Cardiomyopathy ([address] and Hypertension. Past Surgical History:   has a past surgical history that includes Carpal tunnel release (Right); Rotator cuff repair; Tonsillectomy; and Cardioversion. Medications:   Scheduled Meds:    sodium chloride flush  5-40 mL IntraVENous 2 times per day     Continuous Infusions:    sodium chloride 75 mL/hr at 02/08/22 1054    sodium chloride       PRN Meds: sodium chloride, sodium chloride flush  Home Meds:   Prior to Admission medications    Medication Sig Start Date End Date Taking?  Authorizing Provider   amiodarone (CORDARONE) 200 MG tablet TAKE 1 TABLET BY MOUTH THREE TIMES DAILY X 14 DAYS, THEN REDUCE 1 TABLET DAILY 1/26/22  Yes Liss Lewis MD   rosuvastatin (CRESTOR) 40 MG tablet Take 1 tablet by mouth nightly 1/5/22  Yes Liss Lewis MD   aspirin 81 MG EC tablet Take 1 tablet by mouth daily 1/3/22  Yes Liss Lewis MD   clopidogrel (PLAVIX) 75 MG tablet Take 1 tablet by mouth daily 1/3/22  Yes Liss Lewis MD   digoxin (LANOXIN) 125 MCG tablet Take 1 tablet by mouth daily 1/3/22  Yes Liss Lewis MD   apixaban (ELIQUIS) 5 MG TABS tablet Take 1 tablet by mouth 2 times daily 1/3/22  Yes Liss Lewis MD NICOTINE MINI 2 MG lozenge  12/5/21  Yes Historical Provider, MD   sacubitril-valsartan (ENTRESTO) 24-26 MG per tablet Take 1 tablet by mouth 2 times daily 12/13/21  Yes JIA Mcfarland CNP   spironolactone (ALDACTONE) 25 MG tablet Take 1 tablet by mouth daily 12/13/21  Yes JIA Mcfarland CNP   bumetanide (BUMEX) 2 MG tablet Take 1 tablet by mouth daily 12/13/21  Yes JIA Mcfarland CNP   dapagliflozin (FARXIGA) 10 MG tablet Take 1 tablet by mouth every morning 12/13/21  Yes JIA Mcfarland CNP   diazePAM (VALIUM) 5 MG tablet Take 2.5 mg by mouth 2 times daily as needed for Anxiety. Yes Historical Provider, MD   Cholecalciferol (D3 VITAMIN PO) Take 1 capsule by mouth daily   Yes Historical Provider, MD   metoprolol succinate (TOPROL XL) 100 MG extended release tablet Take 1 tablet by mouth daily 11/10/21  Yes Glory Loss, MD   lansoprazole (PREVACID) 15 MG delayed release capsule Take 15 mg by mouth daily   Yes Historical Provider, MD   sildenafil (VIAGRA) 50 MG tablet Take by mouth 9/9/21   Historical Provider, MD   nitroGLYCERIN (NITROSTAT) 0.4 MG SL tablet up to max of 3 total doses. If no relief after 1 dose, call 911. 12/7/21   Cyndi Adams PA-C     Coumadin Use Last 7 Days:  no  Antiplatelet drug therapy use last 7 days: yes - asa and plavix  Other anticoagulant use last 7 days: yes - Apixaban  Additional Medication Information:  Polymyalgia Rheumatica      GENERAL: Alert and oriented. No distress. EYES: No conjunctival pallor or scleral icterus. ENT: Moist oral and nasal mucosae. No central cyanosis. VESSELS: No jugular venous distension. Normal carotid pulse. No carotid bruits. HEART: Normal S1/S2. No murmur, rub or gallop. LUNGS: Clear to auscultation. No wheezes or crackles. ABDOMEN: Soft and non-tender. No organomegaly or shifting dullness. EXTREMITIES: No lower extremity edema, clubbing or cyanosis. 2+ peripheral pulses bilaterally.   NEUROLOGICAL: Higher functions and cranial nerves are normal. Normal power both upper and lower extremities.       Electronically signed by Taylor Blackmon MD on 2/8/2022 at 11:38 AM

## 2022-02-08 NOTE — PLAN OF CARE
Problem: Falls - Risk of:  Goal: Will remain free from falls  Description: Will remain free from falls  Outcome: Completed  Goal: Absence of physical injury  Description: Absence of physical injury  Outcome: Completed   . Denny Wright Care plan reviewed with patient. Patient verbalize understanding of the plan of care and contribute to goal setting.

## 2022-02-23 NOTE — PROGRESS NOTES
Heart Failure Clinic       Visit Date: 2022  Cardiologist:  Dr. Nallely Mott  Primary Care Physician: Dr. Karena Sánchez,     Sravan Tuttle is a 59 y.o. male who presents today for:  Chief Complaint   Patient presents with    Congestive Heart Failure       HPI:   Sravan Tuttle is a 59 y.o. male who presents to the office for a follow up patient visit in the heart failure clinic. Accompanied by no one    TYPE HF: HFrEF (25%)  Cause: ICM/tachy induced  Device: Lifevest  HX: Afib s/p DCCV (2021), CAD s/p PCI LAD (2021), Former smoker (smoking 1-2/day)    Hospitalization:  10/2021 - [facility] -sent from PCP new Afib RVR. CHARLES, SOB, wt gain. Left AMA  [facility] - 10/26/21 - EF 15%  11/10/21 - DCCV successful   21 - [facility] - PCI LAD. LVEDP 12 mmHg. Back in Afib  2022 - DCCV w/ [facility] - f/u ECHO on 3/9    Today: feeling good. Denies SOB, chest pain, palpitations. Not feel limited. Can walk indefinitely. Shoveling snow. Fairly active. Notes bruises \"so easily\". Smoking 1-2 cigs/day  Diet: watching     Patient has:  Chest Pain: no  SOB: no  Orthopnea/PND: no  LINDSAY: no  Edema: no  Fatigue: no  Abdominal bloating: no  Cough: no  Appetite: good  Home weight: stable  Home blood pressure:     Past Medical History:   Diagnosis Date    Anxiety     Arthritis     Atrial fibrillation (HCC)     CAD (coronary artery disease)     atrial fib    Cardiomyopathy ([facility] [address]     Hypertension      Past Surgical History:   Procedure Laterality Date    CARDIOVERSION      CARPAL TUNNEL RELEASE Right     ROTATOR CUFF REPAIR      TONSILLECTOMY       History reviewed. No pertinent family history. Social History     Tobacco Use    Smoking status: Former Smoker     Packs/day: 0.25     Types: Cigarettes     Quit date: 10/24/2021     Years since quittin.3    Smokeless tobacco: Never Used   Substance Use Topics    Alcohol use:  Yes     Alcohol/week: 12.0 standard drinks     Types: 12 Cans of beer per week Current Outpatient Medications   Medication Sig Dispense Refill    amiodarone (CORDARONE) 200 MG tablet TAKE 1 TABLET BY MOUTH THREE TIMES DAILY X 14 DAYS, THEN REDUCE 1 TABLET DAILY 90 tablet 1    rosuvastatin (CRESTOR) 40 MG tablet Take 1 tablet by mouth nightly 90 tablet 5    clopidogrel (PLAVIX) 75 MG tablet Take 1 tablet by mouth daily 90 tablet 3    digoxin (LANOXIN) 125 MCG tablet Take 1 tablet by mouth daily 90 tablet 3    apixaban (ELIQUIS) 5 MG TABS tablet Take 1 tablet by mouth 2 times daily 180 tablet 3    NICOTINE MINI 2 MG lozenge       sacubitril-valsartan (ENTRESTO) 24-26 MG per tablet Take 1 tablet by mouth 2 times daily 60 tablet 5    spironolactone (ALDACTONE) 25 MG tablet Take 1 tablet by mouth daily 30 tablet 5    bumetanide (BUMEX) 2 MG tablet Take 1 tablet by mouth daily 30 tablet 5    dapagliflozin (FARXIGA) 10 MG tablet Take 1 tablet by mouth every morning 30 tablet 5    diazePAM (VALIUM) 5 MG tablet Take 2.5 mg by mouth 2 times daily as needed for Anxiety.  Cholecalciferol (D3 VITAMIN PO) Take 1 capsule by mouth daily      metoprolol succinate (TOPROL XL) 100 MG extended release tablet Take 1 tablet by mouth daily 30 tablet 3    sildenafil (VIAGRA) 50 MG tablet Take by mouth (Patient not taking: Reported on 2/24/2022)      nitroGLYCERIN (NITROSTAT) 0.4 MG SL tablet up to max of 3 total doses. If no relief after 1 dose, call 911. (Patient not taking: Reported on 2/24/2022) 25 tablet 3     No current facility-administered medications for this visit. No Known Allergies    SUBJECTIVE:   Review of Systems   Constitutional: Negative for activity change, appetite change and fatigue. Respiratory: Negative for cough and shortness of breath. Cardiovascular: Negative for chest pain, palpitations and leg swelling. Gastrointestinal: Negative for abdominal distention. Neurological: Negative for weakness, light-headedness and headaches.    Hematological: Negative for adenopathy. Psychiatric/Behavioral: Negative for sleep disturbance. OBJECTIVE:   Today's Vitals:  /60   Pulse 58   Ht 5' 11\" (1.803 m)   Wt 145 lb 12.8 oz (66.1 kg)   SpO2 99%   BMI 20.33 kg/m²     Physical Exam  Vitals reviewed. Constitutional:       General: He is not in acute distress. Appearance: Normal appearance. He is well-developed. He is not diaphoretic. HENT:      Head: Normocephalic and atraumatic. Eyes:      Conjunctiva/sclera: Conjunctivae normal.   Neck:      Comments: No JVD  Cardiovascular:      Rate and Rhythm: Normal rate and regular rhythm. Heart sounds: Normal heart sounds. No murmur heard. Comments: Lifevest on  Pulmonary:      Effort: Pulmonary effort is normal. No respiratory distress. Breath sounds: Normal breath sounds. No wheezing or rales. Abdominal:      General: Bowel sounds are normal. There is no distension. Palpations: Abdomen is soft. Tenderness: There is no abdominal tenderness. Musculoskeletal:         General: Normal range of motion. Cervical back: Normal range of motion and neck supple. Right lower leg: No edema. Left lower leg: No edema. Skin:     General: Skin is warm and dry. Capillary Refill: Capillary refill takes less than 2 seconds. Neurological:      Mental Status: He is alert and oriented to person, place, and time. Coordination: Coordination normal.   Psychiatric:         Behavior: Behavior normal.       Wt Readings from Last 3 Encounters:   02/24/22 145 lb 12.8 oz (66.1 kg)   02/08/22 144 lb (65.3 kg)   12/22/21 141 lb (64 kg)     BP Readings from Last 3 Encounters:   02/24/22 116/60   02/08/22 108/70   12/22/21 97/63     Pulse Readings from Last 3 Encounters:   02/24/22 58   02/08/22 71   12/22/21 85     Body mass index is 20.33 kg/m².   ECHO:    Summary   Ejection fraction is visually estimated at 25%.   There was severe global hypokinesis of the left ventricle.   Mild mitral regurgitation is present.      Signature      ----------------------------------------------------------------   Electronically signed by Isis Morales MD (Interpreting   [email or web address]) on 11/10/2021 at 04:08 PM        CATH/STRESS:   IMPRESSION:  1.  Ischemic cardiomyopathy. 2.  Coronary artery disease. 3.  Severely stenotic lesion of mid segment of left anterior descending  artery, status post successful PCI and stenting.     PLAN:  The patient will be admitted to hospital for observation post PCI  of LAD.  Aggressive risk factor modification.  Dual-antiplatelet therapy  with aspirin and Plavix.  High intensity statin therapy.    Guideline-directed medical therapy for congestive heart failure with  reduced ejection fraction.  May resume oral anticoagulation with Eliquis  tomorrow if condition remains stable.  The patient is on Eliquis for  newly diagnosed atrial fibrillation.     Zhen Isabel MD     D: 12/06/2021 15:20:32          Results reviewed:  BNP: No results found for: BNP  CBC:   Lab Results   Component Value Date    WBC 7.0 02/08/2022    RBC 4.12 02/08/2022    HGB 14.1 02/08/2022    HCT 42.4 02/08/2022     02/08/2022     CMP:    Lab Results   Component Value Date     02/08/2022    K 4.4 02/08/2022    CL 98 02/08/2022    CO2 25 02/08/2022    BUN 13 02/08/2022    CREATININE 0.9 02/08/2022    LABGLOM 85 02/08/2022    GLUCOSE 100 02/08/2022    CALCIUM 8.7 02/08/2022     Hepatic Function Panel:    Lab Results   Component Value Date    ALKPHOS 89 12/29/2021    ALT 19 12/29/2021    AST 25 12/29/2021    PROT 7.0 12/29/2021    BILITOT 0.6 12/29/2021    BILIDIR 0.2 12/29/2021    LABALBU 4.0 12/29/2021     Magnesium:    Lab Results   Component Value Date    MG 2.1 12/21/2021     PT/INR:    Lab Results   Component Value Date    INR 0.88 12/06/2021     Lipids:    Lab Results   Component Value Date    TRIG 48 12/21/2021    HDL 69 12/21/2021    LDLCALC 51 12/21/2021       ASSESSMENT AND PLAN:   The patient's condition/symptoms are Stable: No clinical evidence of fluid overload today. Continue current medical regimen without changes at present time. Diagnosis Orders   1. CHF (congestive heart failure), NYHA class II, chronic, systolic (HCC)  EKG 12 Lead    EKG 12 Lead    Basic Metabolic Panel    Digoxin Level    TSH with Reflex   2. Ischemic cardiomyopathy     3. Atrial fibrillation, unspecified type (HCC)       Continue:   ACE/ARB/ARNI - Entresto 24/26 BID              BB - Toprol 100/day              Diuretic - Bumex 2/day and Kcl 20/day  AA - Aldactone 25/day   SGLT2 -  Farxiga 10/day  Vasodilator - no  Other - Digoxin, Plavix, Eliquis, ASA (stop), Amio  HFrEF NYHA II - EF 25%  ICM, tachy induced  New Afib s/p DCCV 11/2021, s/p DCCV 2/8/22  CAD s/p PCI 12/2021  Stable, appears Euvolemic  Lab reviewed - stable  Repeat blood work in 3 months - BMP, Dig, TSH  BP/HR stable   EKG today - sinus joyce  Stop ASA - on Eliquis and Plavix - worsening bruising  Continue diet/fluid adherence  Continue daily wts. Continue Lifevest  Repeat ECHO on 3/9/22 w/ F/U w/ [facility]im after  F/U w/ Cardiology  F/U in clinic in 5 months    Tolerating above noted HF meds, no ill side effects noted. Will continue to monitor kidney function and electrolytes. Will optimize as tolerated. Pt is compliant w/ medications. Total visit time of 30 minutes has been spent with patient on education of symptoms, management, medication, and plan of care; as well as review of chart: labs, ECHO, radiology reports, etc.   I personally spent more then 50% of the appt time face to face with the patient. · Daily weights  · Fluid restriction of 2 Liters per day  · Limit sodium in diet to around 0740-9772 mg/day  · Monitor BP  · Activity as tolerated     Patient was instructed to call the [facility] for any changes in symptoms as noted in AVS.      Return in about 6 months (around 8/24/2022).  or sooner if needed     Patient given

## 2022-02-24 ENCOUNTER — OFFICE VISIT (OUTPATIENT)
Dept: CARDIOLOGY CLINIC | Age: 62
End: 2022-02-24
Payer: COMMERCIAL

## 2022-02-24 VITALS
HEIGHT: 71 IN | DIASTOLIC BLOOD PRESSURE: 60 MMHG | WEIGHT: 145.8 LBS | SYSTOLIC BLOOD PRESSURE: 116 MMHG | OXYGEN SATURATION: 99 % | BODY MASS INDEX: 20.41 KG/M2 | HEART RATE: 58 BPM

## 2022-02-24 DIAGNOSIS — I48.91 ATRIAL FIBRILLATION, UNSPECIFIED TYPE (HCC): ICD-10-CM

## 2022-02-24 DIAGNOSIS — I25.5 ISCHEMIC CARDIOMYOPATHY: ICD-10-CM

## 2022-02-24 DIAGNOSIS — I50.22 CHF (CONGESTIVE HEART FAILURE), NYHA CLASS II, CHRONIC, SYSTOLIC (HCC): Primary | ICD-10-CM

## 2022-02-24 PROCEDURE — 93000 ELECTROCARDIOGRAM COMPLETE: CPT | Performed by: NURSE PRACTITIONER

## 2022-02-24 PROCEDURE — G8427 DOCREV CUR MEDS BY ELIG CLIN: HCPCS | Performed by: NURSE PRACTITIONER

## 2022-02-24 PROCEDURE — 3017F COLORECTAL CA SCREEN DOC REV: CPT | Performed by: NURSE PRACTITIONER

## 2022-02-24 PROCEDURE — 1036F TOBACCO NON-USER: CPT | Performed by: NURSE PRACTITIONER

## 2022-02-24 PROCEDURE — G8420 CALC BMI NORM PARAMETERS: HCPCS | Performed by: NURSE PRACTITIONER

## 2022-02-24 PROCEDURE — 99214 OFFICE O/P EST MOD 30 MIN: CPT | Performed by: NURSE PRACTITIONER

## 2022-02-24 PROCEDURE — G8484 FLU IMMUNIZE NO ADMIN: HCPCS | Performed by: NURSE PRACTITIONER

## 2022-02-24 ASSESSMENT — ENCOUNTER SYMPTOMS
ABDOMINAL DISTENTION: 0
SHORTNESS OF BREATH: 0
COUGH: 0

## 2022-02-24 NOTE — PATIENT INSTRUCTIONS
You may receive a survey regarding the care you received during your visit. Your input is valuable to us. We encourage you to complete and return your survey. We hope you will choose us in the future for your healthcare needs.     Continue:  · Continue current medications  · Daily weights and record  · Fluid restriction of 2 Liters per day  · Limit sodium in diet to around 0748-2849 mg/day  · Monitor BP  · Activity as tolerated     Call the Heart Failure Clinic for any of the following symptoms: [phone]   Weight gain of 2-3 pounds in 1 day or 5 pounds in 1 week   Increased shortness of breath   Shortness of breath while laying down   Cough   Chest pain   Swelling in feet, ankles or legs   Tenderness or bloating in the abdomen   Fatigue    Decreased appetite or nausea    Confusion

## 2022-03-09 ENCOUNTER — HOSPITAL ENCOUNTER (OUTPATIENT)
Dept: NON INVASIVE DIAGNOSTICS | Age: 62
Discharge: HOME | End: 2022-03-09
Payer: COMMERCIAL

## 2022-03-09 ENCOUNTER — OFFICE VISIT (OUTPATIENT)
Dept: CARDIOLOGY CLINIC | Age: 62
End: 2022-03-09
Payer: COMMERCIAL

## 2022-03-09 VITALS
WEIGHT: 145.6 LBS | DIASTOLIC BLOOD PRESSURE: 70 MMHG | BODY MASS INDEX: 20.38 KG/M2 | HEIGHT: 71 IN | SYSTOLIC BLOOD PRESSURE: 125 MMHG | HEART RATE: 62 BPM

## 2022-03-09 DIAGNOSIS — I48.91 ATRIAL FIBRILLATION STATUS POST CARDIOVERSION (HCC): Primary | ICD-10-CM

## 2022-03-09 DIAGNOSIS — I48.91 ATRIAL FIBRILLATION, UNSPECIFIED TYPE (HCC): ICD-10-CM

## 2022-03-09 PROCEDURE — 93307 TTE W/O DOPPLER COMPLETE: CPT

## 2022-03-09 PROCEDURE — G8484 FLU IMMUNIZE NO ADMIN: HCPCS | Performed by: INTERNAL MEDICINE

## 2022-03-09 PROCEDURE — G8427 DOCREV CUR MEDS BY ELIG CLIN: HCPCS | Performed by: INTERNAL MEDICINE

## 2022-03-09 PROCEDURE — 3017F COLORECTAL CA SCREEN DOC REV: CPT | Performed by: INTERNAL MEDICINE

## 2022-03-09 PROCEDURE — G8420 CALC BMI NORM PARAMETERS: HCPCS | Performed by: INTERNAL MEDICINE

## 2022-03-09 PROCEDURE — 99214 OFFICE O/P EST MOD 30 MIN: CPT | Performed by: INTERNAL MEDICINE

## 2022-03-09 PROCEDURE — 93000 ELECTROCARDIOGRAM COMPLETE: CPT | Performed by: INTERNAL MEDICINE

## 2022-03-09 PROCEDURE — 1036F TOBACCO NON-USER: CPT | Performed by: INTERNAL MEDICINE

## 2022-03-09 NOTE — PROGRESS NOTES
EKG obtained    Patient admits to  Heart racing/ skipping beats  Patient admits to history of   [x] Cardioversion- 2.08. 2022  Echo results [phone]

## 2022-03-09 NOTE — PROGRESS NOTES
HISTORY:  Past Medical History:   Diagnosis Date    Anxiety     Arthritis     Atrial fibrillation (HCC)     CAD (coronary artery disease)     atrial fib    Cardiomyopathy ([facility] [address]     Hypertension        PSH:  Past Surgical History:   Procedure Laterality Date    CARDIOVERSION      CARPAL TUNNEL RELEASE Right     ROTATOR CUFF REPAIR      TONSILLECTOMY         FAMILY HISTORY:  No family history on file. SOCIAL HISTORY:  Social History     Socioeconomic History    Marital status:      Spouse name: Not on file    Number of children: 11    Years of education: Not on file    Highest education level: Not on file   Occupational History    Not on file   Tobacco Use    Smoking status: Former Smoker     Packs/day: 0.25     Types: Cigarettes     Quit date: 10/24/2021     Years since quittin.3    Smokeless tobacco: Never Used   Substance and Sexual Activity    Alcohol use: Yes     Alcohol/week: 12.0 standard drinks     Types: 12 Cans of beer per week    Drug use: Not Currently    Sexual activity: Not on file   Other Topics Concern    Not on file   Social History Narrative    Not on file     Social Determinants of Health     Financial Resource Strain:     Difficulty of Paying Living Expenses: Not on file   Food Insecurity:     Worried About Running Out of Food in the Last Year: Not on file    Kenneth of Food in the Last Year: Not on file   Transportation Needs:     Lack of Transportation (Medical): Not on file    Lack of Transportation (Non-Medical):  Not on file   Physical Activity:     Days of Exercise per Week: Not on file    Minutes of Exercise per Session: Not on file   Stress:     Feeling of Stress : Not on file   Social Connections:     Frequency of Communication with Friends and Family: Not on file    Frequency of Social Gatherings with Friends and Family: Not on file    Attends [employer] Services: Not on file    Active Member of Clubs or Organizations: Not on file    Attends Club or Organization Meetings: Not on file    Marital Status: Not on file   Intimate Partner Violence:     Fear of Current or Ex-Partner: Not on file    Emotionally Abused: Not on file    Physically Abused: Not on file    Sexually Abused: Not on file   Housing Stability:     Unable to Pay for Housing in the Last Year: Not on file    Number of David in the Last Year: Not on file    Unstable Housing in the Last Year: Not on file        ALLERGY HISTORY:  No Known Allergies     MEDICATIONS:  Current Outpatient Medications   Medication Sig Dispense Refill    amiodarone (CORDARONE) 200 MG tablet TAKE 1 TABLET BY MOUTH THREE TIMES DAILY X 14 DAYS, THEN REDUCE 1 TABLET DAILY 90 tablet 1    rosuvastatin (CRESTOR) 40 MG tablet Take 1 tablet by mouth nightly 90 tablet 5    clopidogrel (PLAVIX) 75 MG tablet Take 1 tablet by mouth daily 90 tablet 3    digoxin (LANOXIN) 125 MCG tablet Take 1 tablet by mouth daily 90 tablet 3    apixaban (ELIQUIS) 5 MG TABS tablet Take 1 tablet by mouth 2 times daily 180 tablet 3    NICOTINE MINI 2 MG lozenge       sildenafil (VIAGRA) 50 MG tablet Take by mouth (Patient not taking: Reported on 2/24/2022)      sacubitril-valsartan (ENTRESTO) 24-26 MG per tablet Take 1 tablet by mouth 2 times daily 60 tablet 5    spironolactone (ALDACTONE) 25 MG tablet Take 1 tablet by mouth daily 30 tablet 5    bumetanide (BUMEX) 2 MG tablet Take 1 tablet by mouth daily 30 tablet 5    dapagliflozin (FARXIGA) 10 MG tablet Take 1 tablet by mouth every morning 30 tablet 5    nitroGLYCERIN (NITROSTAT) 0.4 MG SL tablet up to max of 3 total doses. If no relief after 1 dose, call 911. (Patient not taking: Reported on 2/24/2022) 25 tablet 3    diazePAM (VALIUM) 5 MG tablet Take 2.5 mg by mouth 2 times daily as needed for Anxiety.       Cholecalciferol (D3 VITAMIN PO) Take 1 capsule by mouth daily      metoprolol succinate (TOPROL XL) 100 MG extended release tablet Take 1 tablet by mouth daily 30 tablet 3     No current facility-administered medications for this visit. PHYSICAL EXAM:  Vitals:    03/09/22 1023   BP: 125/70   Pulse: 62     Body mass index is 20.31 kg/m². GENERAL: Alert and oriented. No distress. EYES: No pallor or icterus. ENT: No cyanosis. No thyromegaly or cervical LAP. VESSELS: No jugular venous distension or carotid bruits. HEART: Normal S1/S2. No murmur, rub or gallop. LUNGS: Clear to auscultation. ABDOMEN: Soft and non-tender. CHEST Wearing LifeVest.  EXTREMITIES: No lower extremity edema. Feet are warm. NEUROLOGICAL: Grossly normal.     LABORATORY DATA AND DIAGNOSTIC DATA:  I have personally reviewed and interpreted the results of the following diagnostic testing    Lab Results   Component Value Date    WBC 7.0 02/08/2022    HGB 14.1 02/08/2022    HCT 42.4 02/08/2022     02/08/2022    CHOL 130 12/21/2021    TRIG 48 12/21/2021    HDL 69 (H) 12/21/2021    ALT 19 12/29/2021    AST 25 12/29/2021     (L) 02/08/2022    K 4.4 02/08/2022    CL 98 02/08/2022    CREATININE 0.9 02/08/2022    BUN 13 02/08/2022    CO2 25 02/08/2022    TSH 0.97 12/29/2021    INR 0.88 12/06/2021      Echocardiogram ([facility]ANDEL) 11/10/2021: LVEF 25%. Mild mitral regurgitation.  ECG 12/13/2021: Atrial fibrillation, controlled ventricular rate and narrow QRS. ECG 12/22/2021: Atrial fibrillation with controlled ventricular rate. ECG 3/9/2020: Sinus rhythm. Normal QRS duration QTc interval.   Coronary angiogram 12/6/2021: PCI to mid LAD. IMPRESSION:  · PeAF s/p DCCV. [id number] score 3 (HTN, CHF and CAD). · ICM, LVEF improved, 45%, NYHA III. · CAD/PCI-mLAD (12/6/2021). · HTN, controlled. · LifeVest, no therapies. ASSESSMENT AND RECOMMENDATIONS:  The patient is doing well. In sinus rhythm. Marked symptomatic improvement. His left and prostatic function has improved following the control. He can safely discontinue LifeVest.  No more indication for ICD implantation. Continue current medical therapy. Follow-up in 6 months. Thank you for allowing me to participate in the care of your patient. Please call me if you have any questions. **This report has been created using voice recognition software. It may contain minor errors which are inherent in voice recognition technology. **       Yandel Mackey MD, MRCP, [facility], [facility] on 3/9/2022 at 10:38 AM

## 2022-03-10 RX ORDER — SPIRONOLACTONE 25 MG/1
25 TABLET ORAL DAILY
Qty: 30 TABLET | Refills: 5 | Status: SHIPPED | OUTPATIENT
Start: 2022-03-10 | End: 2022-04-19

## 2022-03-21 RX ORDER — METOPROLOL SUCCINATE 100 MG/1
TABLET, EXTENDED RELEASE ORAL
Qty: 30 TABLET | Refills: 0 | Status: SHIPPED | OUTPATIENT
Start: 2022-03-21 | End: 2022-04-18

## 2022-04-18 RX ORDER — AMIODARONE HYDROCHLORIDE 200 MG/1
200 TABLET ORAL DAILY
Qty: 30 TABLET | Refills: 0 | Status: SHIPPED | OUTPATIENT
Start: 2022-04-18 | End: 2022-06-14

## 2022-04-18 RX ORDER — METOPROLOL SUCCINATE 100 MG/1
TABLET, EXTENDED RELEASE ORAL
Qty: 30 TABLET | Refills: 0 | Status: SHIPPED | OUTPATIENT
Start: 2022-04-18 | End: 2022-05-16

## 2022-04-19 ENCOUNTER — OFFICE VISIT (OUTPATIENT)
Dept: CARDIOLOGY CLINIC | Age: 62
End: 2022-04-19
Payer: COMMERCIAL

## 2022-04-19 VITALS
DIASTOLIC BLOOD PRESSURE: 70 MMHG | HEIGHT: 72 IN | BODY MASS INDEX: 19.64 KG/M2 | SYSTOLIC BLOOD PRESSURE: 120 MMHG | WEIGHT: 145 LBS | HEART RATE: 70 BPM

## 2022-04-19 DIAGNOSIS — I25.10 CORONARY ARTERY DISEASE INVOLVING NATIVE CORONARY ARTERY OF NATIVE HEART WITHOUT ANGINA PECTORIS: Primary | ICD-10-CM

## 2022-04-19 DIAGNOSIS — I42.0 DILATED CARDIOMYOPATHY (HCC): ICD-10-CM

## 2022-04-19 DIAGNOSIS — I10 PRIMARY HYPERTENSION: ICD-10-CM

## 2022-04-19 PROCEDURE — G8427 DOCREV CUR MEDS BY ELIG CLIN: HCPCS | Performed by: NUCLEAR MEDICINE

## 2022-04-19 PROCEDURE — 1036F TOBACCO NON-USER: CPT | Performed by: NUCLEAR MEDICINE

## 2022-04-19 PROCEDURE — 3017F COLORECTAL CA SCREEN DOC REV: CPT | Performed by: NUCLEAR MEDICINE

## 2022-04-19 PROCEDURE — 99214 OFFICE O/P EST MOD 30 MIN: CPT | Performed by: NUCLEAR MEDICINE

## 2022-04-19 PROCEDURE — G8420 CALC BMI NORM PARAMETERS: HCPCS | Performed by: NUCLEAR MEDICINE

## 2022-04-19 NOTE — PROGRESS NOTES
[address] [address] [address] [facility],[address] [address] [facility]  Dept: [phone]  Dept Fax: [phone]  Loc: [phone]    Visit Date: 2022    Klarissa Leon is a 59 y.o. male who presents todayfor:  Chief Complaint   Patient presents with    Check-Up    Hypertension    Cardiomyopathy    Coronary Artery Disease   had cardioversion   Twice  Started with EF 10%  Got better  Also had a stent to the LAd  Some breast tenderness  Some dyspnea  Got better  No chest pain  BP seems stable   No dizziness  No syncope  No bleeding   On statins for hyperlipidemia          HPI:  HPI  Past Medical History:   Diagnosis Date    Anxiety     Arthritis     Atrial fibrillation ([address]     CAD (coronary artery disease)     atrial fib    Cardiomyopathy ([address]     Hypertension       Past Surgical History:   Procedure Laterality Date    CARDIOVERSION      CARPAL TUNNEL RELEASE Right     ROTATOR CUFF REPAIR      TONSILLECTOMY       No family history on file. Social History     Tobacco Use    Smoking status: Former Smoker     Packs/day: 0.25     Types: Cigarettes     Quit date: 10/24/2021     Years since quittin.4    Smokeless tobacco: Never Used   Substance Use Topics    Alcohol use:  Yes     Alcohol/week: 12.0 standard drinks     Types: 12 Cans of beer per week      Current Outpatient Medications   Medication Sig Dispense Refill    amiodarone (CORDARONE) 200 MG tablet Take 1 tablet by mouth daily 30 tablet 0    metoprolol succinate (TOPROL XL) 100 MG extended release tablet TAKE 1 TABLET BY MOUTH EVERY DAY 30 tablet 0    dapagliflozin (FARXIGA) 10 MG tablet Take 1 tablet by mouth every morning 30 tablet 5    sacubitril-valsartan (ENTRESTO) 24-26 MG per tablet Take 1 tablet by mouth 2 times daily 60 tablet 5    spironolactone (ALDACTONE) 25 MG tablet Take 1 tablet by mouth daily 30 tablet 5    rosuvastatin (CRESTOR) 40 MG tablet Take 1 tablet by mouth nightly 90 tablet 5    clopidogrel (PLAVIX) 75 MG tablet Take 1 tablet by mouth daily 90 tablet 3    digoxin (LANOXIN) 125 MCG tablet Take 1 tablet by mouth daily (Patient taking differently: Take 125 mcg by mouth daily Taking . 5 tab) 90 tablet 3    apixaban (ELIQUIS) 5 MG TABS tablet Take 1 tablet by mouth 2 times daily 180 tablet 3    NICOTINE MINI 2 MG lozenge       sildenafil (VIAGRA) 50 MG tablet Take by mouth       bumetanide (BUMEX) 2 MG tablet Take 1 tablet by mouth daily 30 tablet 5    nitroGLYCERIN (NITROSTAT) 0.4 MG SL tablet up to max of 3 total doses. If no relief after 1 dose, call 911. 25 tablet 3    diazePAM (VALIUM) 5 MG tablet Take 2.5 mg by mouth 2 times daily as needed for Anxiety.  Cholecalciferol (D3 VITAMIN PO) Take 1 capsule by mouth daily       No current facility-administered medications for this visit.      No Known Allergies  Health Maintenance   Topic Date Due    Hepatitis C screen  Never done    COVID-19 Vaccine (1) Never done    Pneumococcal 0-64 years Vaccine (1 - PCV) Never done    Depression Screen  Never done    HIV screen  Never done    Colorectal Cancer Screen  Never done    DTaP/Tdap/Td vaccine (1 - Tdap) 09/10/2005    Shingles Vaccine (1 of 2) Never done    Flu vaccine (Season Ended) 09/01/2022    Lipid screen  12/21/2022    TSH testing  12/29/2022    Potassium monitoring  02/08/2023    Creatinine monitoring  02/08/2023    Hepatitis A vaccine  Aged Out    Hepatitis B vaccine  Aged Out    Hib vaccine  Aged Out    Meningococcal (ACWY) vaccine  Aged Out       Subjective:  Review of Systems  General:   No fever, no chills, No fatigue or weight loss  Pulmonary:    some dyspnea, no wheezing  Cardiac:    Denies recent chest pain,   GI:     No nausea or vomiting, no abdominal pain  Neuro:    No dizziness or light headedness,   Musculoskeletal:  No recent active issues  Extremities:   No edema, no obvious claudication       Objective:  Physical Exam  BP 120/70   Pulse 70   Ht 6' (1.829 m)   Wt 145 lb (65.8 kg)   BMI 19.67 kg/m²   General:   Well developed, well nourished  Lungs:   Clear to auscultation  Heart:    Normal S1 S2, Slight murmur. no rubs, no gallops  Abdomen:   Soft, non tender, no organomegalies, positive bowel sounds  Extremities:   No edema, no cyanosis, good peripheral pulses  Neurological:   Awake, alert, oriented. No obvious focal deficits  Musculoskelatal:  No obvious deformities    Assessment:      Diagnosis Orders   1. Coronary artery disease involving native coronary artery of native heart without angina pectoris     2. Primary hypertension     3. Dilated cardiomyopathy ([address]     as above  Cardiac seems stable   Likely aldactone side effects     Plan:  No follow-ups on file. Stop aldactone   Monitor the BP  Continue risk factor modification and medical management  Thank you for allowing me to participate in the care of your patient. Please don't hesitate to contact me regarding any further issues related to the patient care    Orders Placed:  No orders of the defined types were placed in this encounter. Medications Prescribed:  No orders of the defined types were placed in this encounter. Discussed use, benefit, and side effects of prescribed medications. All patient questions answered. Pt voicedunderstanding. Instructed to continue current medications, diet and exercise. Continue risk factor modification and medical management. Patient agreed with treatment plan. Follow up as directed.     Electronically signedby Savannah Reinoso MD on 4/19/2022 at 12:41 PM

## 2022-05-12 ENCOUNTER — TELEPHONE (OUTPATIENT)
Dept: CARDIOLOGY CLINIC | Age: 62
End: 2022-05-12

## 2022-05-12 NOTE — TELEPHONE ENCOUNTER
Denial scanned in for lifevest, someone talked to [facility] last week     Lm riky Chauhan with [employer] at [phone] to call office to see if he got this denial taken care of ??  Or what we need to do for this pt

## 2022-05-12 NOTE — TELEPHONE ENCOUNTER
Charmaine Dubon and Geri WISDOM on VM. Spoke with Charmaine Dubon. He and Jose Carr are working together on this. Charmaine Dubon said to notify the pt that if he gets a bill to know that Alexa Alcala is working with [employer]. Pt notified.

## 2022-05-16 RX ORDER — METOPROLOL SUCCINATE 100 MG/1
TABLET, EXTENDED RELEASE ORAL
Qty: 30 TABLET | Refills: 5 | Status: SHIPPED | OUTPATIENT
Start: 2022-05-16

## 2022-05-17 LAB
BUN BLDV-MCNC: 9 MG/DL
CALCIUM SERPL-MCNC: 9.5 MG/DL
CHLORIDE BLD-SCNC: 94 MMOL/L
CO2: 31 MMOL/L
CREAT SERPL-MCNC: 1 MG/DL
GFR CALCULATED: 80
GLUCOSE BLD-MCNC: 123 MG/DL
POTASSIUM SERPL-SCNC: 3.8 MMOL/L
SODIUM BLD-SCNC: 134 MMOL/L
TSH SERPL DL<=0.05 MIU/L-ACNC: 1.13 UIU/ML

## 2022-05-18 ENCOUNTER — TELEPHONE (OUTPATIENT)
Dept: CARDIOLOGY CLINIC | Age: 62
End: 2022-05-18

## 2022-05-18 NOTE — TELEPHONE ENCOUNTER
Called to patient  \"The wireless customer you are trying to reach is unavailable please try again later\"

## 2022-05-18 NOTE — TELEPHONE ENCOUNTER
----- Message from JIA Justice CNP sent at 5/18/2022  2:17 PM EDT -----  Labs look good - continue meds same.

## 2022-06-14 RX ORDER — AMIODARONE HYDROCHLORIDE 200 MG/1
TABLET ORAL
Qty: 30 TABLET | Refills: 2 | Status: SHIPPED | OUTPATIENT
Start: 2022-06-14 | End: 2022-09-15

## 2022-07-21 RX ORDER — BUMETANIDE 2 MG/1
TABLET ORAL
Qty: 90 TABLET | Refills: 1 | Status: SHIPPED | OUTPATIENT
Start: 2022-07-21

## 2022-08-23 ENCOUNTER — OFFICE VISIT (OUTPATIENT)
Dept: CARDIOLOGY CLINIC | Age: 62
End: 2022-08-23
Payer: COMMERCIAL

## 2022-08-23 VITALS
WEIGHT: 143 LBS | OXYGEN SATURATION: 97 % | DIASTOLIC BLOOD PRESSURE: 62 MMHG | SYSTOLIC BLOOD PRESSURE: 128 MMHG | HEIGHT: 72 IN | HEART RATE: 55 BPM | BODY MASS INDEX: 19.37 KG/M2

## 2022-08-23 DIAGNOSIS — I48.91 ATRIAL FIBRILLATION, UNSPECIFIED TYPE (HCC): ICD-10-CM

## 2022-08-23 DIAGNOSIS — I50.22 CHF (CONGESTIVE HEART FAILURE), NYHA CLASS II, CHRONIC, SYSTOLIC (HCC): Primary | ICD-10-CM

## 2022-08-23 DIAGNOSIS — I25.5 ISCHEMIC CARDIOMYOPATHY: ICD-10-CM

## 2022-08-23 DIAGNOSIS — I25.10 CORONARY ARTERY DISEASE INVOLVING NATIVE CORONARY ARTERY OF NATIVE HEART WITHOUT ANGINA PECTORIS: ICD-10-CM

## 2022-08-23 PROCEDURE — G8427 DOCREV CUR MEDS BY ELIG CLIN: HCPCS | Performed by: NURSE PRACTITIONER

## 2022-08-23 PROCEDURE — 3017F COLORECTAL CA SCREEN DOC REV: CPT | Performed by: NURSE PRACTITIONER

## 2022-08-23 PROCEDURE — G8420 CALC BMI NORM PARAMETERS: HCPCS | Performed by: NURSE PRACTITIONER

## 2022-08-23 PROCEDURE — 1036F TOBACCO NON-USER: CPT | Performed by: NURSE PRACTITIONER

## 2022-08-23 PROCEDURE — 99214 OFFICE O/P EST MOD 30 MIN: CPT | Performed by: NURSE PRACTITIONER

## 2022-08-23 ASSESSMENT — ENCOUNTER SYMPTOMS
COUGH: 0
SHORTNESS OF BREATH: 0
ABDOMINAL DISTENTION: 0

## 2022-08-23 NOTE — PATIENT INSTRUCTIONS
You may receive a survey regarding the care you received during your visit. Your input is valuable to us. We encourage you to complete and return your survey. We hope you will choose us in the future for your healthcare needs.     Continue:  Continue current medications  Daily weights and record  Fluid restriction of 2 Liters per day  Limit sodium in diet to around 6494-3255 mg/day  Monitor BP  Activity as tolerated     Call the Heart Failure Clinic for any of the following symptoms: [phone]  Weight gain of 2-3 pounds in 1 day or 5 pounds in 1 week  Increased shortness of breath  Shortness of breath while laying down  Cough  Chest pain  Swelling in feet, ankles or legs  Tenderness or bloating in the abdomen  Fatigue   Decreased appetite or nausea   Confusion

## 2022-08-23 NOTE — PROGRESS NOTES
Heart Failure Clinic       Visit Date: 2022  Cardiologist:   [facility]  Primary Care Physician: Dr. Jose Amezcua,     Antonio Gagnon is a 59 y.o. male who presents today for:  Chief Complaint   Patient presents with    Congestive Heart Failure       HPI:   Antonio Gagnon is a 59 y.o. male who presents to the office for a follow up patient visit in the heart failure clinic. Accompanied by no one    TYPE HF: HFrEF (25% improved 40-45% - 3/2022)  Cause: ICM/tachy induced  Device: no  HX: Afib s/p DCCV (2021, 2022), CAD s/p PCI LAD (2021), Former smoker (smoking 1-2/day)     Hospitalization:  10/2021 - [facility] -sent from PCP new Afib RVR. CHARLES, SOB, wt gain. Left AMA  OV Baki - 10/26/21 - EF 15%  11/10/21 - DCCV successful   21 - [facility] - PCI LAD. LVEDP 12 mmHg. Back in Afib  2022 - DCCV w/ Hakim - f/u ECHO on 3/9    Last OV 2022 - doing well, still smoking  Today: feeling well overall. Denies SOB, swelling, bloating or palpitations. Babysitting grandkids a lot = keeps busy. Getting around well. Ready for deer hunting  No concerns voiced except still poor sleep - doing some marijuana to help    Patient has:  Chest Pain: no  SOB: no  Orthopnea/PND: no  LINDSAY: no  Edema: no  Fatigue: no  Abdominal bloating: no  Cough: no  Appetite: good  Home weight: stable  Home blood pressure: stable    Past Medical History:   Diagnosis Date    Anxiety     Arthritis     Atrial fibrillation (HCC)     CAD (coronary artery disease)     atrial fib    Cardiomyopathy ([address]     Hypertension      Past Surgical History:   Procedure Laterality Date    CARDIOVERSION      CARPAL TUNNEL RELEASE Right     ROTATOR CUFF REPAIR      TONSILLECTOMY       No family history on file. Social History     Tobacco Use    Smoking status: Former     Packs/day: 0.25     Types: Cigarettes     Quit date: 10/24/2021     Years since quittin.8    Smokeless tobacco: Never   Substance Use Topics    Alcohol use:  Yes Alcohol/week: 12.0 standard drinks     Types: 12 Cans of beer per week     Current Outpatient Medications   Medication Sig Dispense Refill    bumetanide (BUMEX) 2 MG tablet TAKE 1 TABLET BY MOUTH EVERY DAY 90 tablet 1    amiodarone (CORDARONE) 200 MG tablet TAKE 1 TABLET BY MOUTH EVERY DAY 30 tablet 2    metoprolol succinate (TOPROL XL) 100 MG extended release tablet TAKE 1 TABLET BY MOUTH EVERY DAY 30 tablet 5    dapagliflozin (FARXIGA) 10 MG tablet Take 1 tablet by mouth every morning 30 tablet 5    sacubitril-valsartan (ENTRESTO) 24-26 MG per tablet Take 1 tablet by mouth 2 times daily 60 tablet 5    rosuvastatin (CRESTOR) 40 MG tablet Take 1 tablet by mouth nightly 90 tablet 5    clopidogrel (PLAVIX) 75 MG tablet Take 1 tablet by mouth daily 90 tablet 3    digoxin (LANOXIN) 125 MCG tablet Take 1 tablet by mouth daily 90 tablet 3    apixaban (ELIQUIS) 5 MG TABS tablet Take 1 tablet by mouth 2 times daily 180 tablet 3    NICOTINE MINI 2 MG lozenge       sildenafil (VIAGRA) 50 MG tablet Take by mouth       nitroGLYCERIN (NITROSTAT) 0.4 MG SL tablet up to max of 3 total doses. If no relief after 1 dose, call 911. 25 tablet 3    diazePAM (VALIUM) 5 MG tablet Take 2.5 mg by mouth as needed for Anxiety. Cholecalciferol (D3 VITAMIN PO) Take 1 capsule by mouth daily       No current facility-administered medications for this visit. No Known Allergies    SUBJECTIVE:   Review of Systems   Constitutional:  Negative for activity change, appetite change and fatigue. Respiratory:  Negative for cough and shortness of breath. Cardiovascular:  Negative for chest pain, palpitations and leg swelling. Gastrointestinal:  Negative for abdominal distention. Neurological:  Negative for weakness, light-headedness and headaches. Hematological:  Negative for adenopathy. Psychiatric/Behavioral:  Negative for sleep disturbance.       OBJECTIVE:   Today's Vitals:  /62   Pulse 55   Ht 6' (1.829 m)   Wt 143 lb (64.9 kg)   SpO2 97%   BMI 19.39 kg/m²     Physical Exam  Vitals reviewed. Constitutional:       General: He is not in acute distress. Appearance: Normal appearance. He is well-developed. He is not diaphoretic. HENT:      Head: Normocephalic and atraumatic. Eyes:      Conjunctiva/sclera: Conjunctivae normal.   Neck:      Comments: No JVD  Cardiovascular:      Rate and Rhythm: Normal rate and regular rhythm. Heart sounds: Normal heart sounds. No murmur heard. Pulmonary:      Effort: Pulmonary effort is normal. No respiratory distress. Breath sounds: Normal breath sounds. No wheezing or rales. Abdominal:      General: Bowel sounds are normal. There is no distension. Palpations: Abdomen is soft. Tenderness: There is no abdominal tenderness. Musculoskeletal:         General: Normal range of motion. Cervical back: Normal range of motion and neck supple. Right lower leg: No edema. Left lower leg: No edema. Skin:     General: Skin is warm and dry. Capillary Refill: Capillary refill takes less than 2 seconds. Neurological:      Mental Status: He is alert and oriented to person, place, and time. Coordination: Coordination normal.   Psychiatric:         Behavior: Behavior normal.       Wt Readings from Last 3 Encounters:   08/23/22 143 lb (64.9 kg)   04/19/22 145 lb (65.8 kg)   03/09/22 145 lb 9.6 oz (66 kg)     BP Readings from Last 3 Encounters:   08/23/22 128/62   04/19/22 120/70   03/09/22 125/70     Pulse Readings from Last 3 Encounters:   08/23/22 55   04/19/22 70   03/09/22 62     Body mass index is 19.39 kg/m². ECHO     Summary   When this echo is compared with the echo from 11/10/2021, left ventricular   systolic function has improved. .   Left ventricle size is normal.   Left ventricular systolic function is mildly reduced. Ejection fraction is visually estimated at 40-45%. Normal right ventricular size and function.    No evidence of any pericardial effusion. Signature      ----------------------------------------------------------------   Electronically signed by Mecca Paredes MD (Interpreting   physician) on 03/09/2022 at 10:45 AM   ----------------------------------------------------------------    ECHO:    Summary   Ejection fraction is visually estimated at 25%. There was severe global hypokinesis of the left ventricle. Mild mitral regurgitation is present. Signature      ----------------------------------------------------------------   Electronically signed by Meera Mcgrath MD (Interpreting   physician) on 11/10/2021 at 04:08 PM        CATH/STRESS:   IMPRESSION:  1. Ischemic cardiomyopathy. 2.  Coronary artery disease. 3.  Severely stenotic lesion of mid segment of left anterior descending  artery, status post successful PCI and stenting. PLAN:  The patient will be admitted to hospital for observation post PCI  of LAD. Aggressive risk factor modification. Dual-antiplatelet therapy  with aspirin and Plavix. High intensity statin therapy. Guideline-directed medical therapy for congestive heart failure with  reduced ejection fraction. May resume oral anticoagulation with Eliquis  tomorrow if condition remains stable. The patient is on Eliquis for  newly diagnosed atrial fibrillation.      Birdie Isidro MD     D: 12/06/2021 15:20:32       Results reviewed:  BNP: No results found for: BNP  CBC:   Lab Results   Component Value Date/Time    WBC 7.0 02/08/2022 09:19 AM    RBC 4.12 02/08/2022 09:19 AM    HGB 14.1 02/08/2022 09:19 AM    HCT 42.4 02/08/2022 09:19 AM     02/08/2022 09:19 AM     CMP:    Lab Results   Component Value Date/Time     05/17/2022 12:00 AM    K 3.8 05/17/2022 12:00 AM    CL 94 05/17/2022 12:00 AM    CO2 31 05/17/2022 12:00 AM    BUN 9 05/17/2022 12:00 AM    CREATININE 1.0 05/17/2022 12:00 AM    LABGLOM 80 05/17/2022 12:00 AM    LABGLOM 85 02/08/2022 09:19 AM    GLUCOSE 123 05/17/2022 12:00 AM    CALCIUM 9.5 05/17/2022 12:00 AM     Hepatic Function Panel:    Lab Results   Component Value Date/Time    ALKPHOS 89 12/29/2021 12:00 AM    ALT 19 12/29/2021 12:00 AM    AST 25 12/29/2021 12:00 AM    PROT 7.0 12/29/2021 12:00 AM    BILITOT 0.6 12/29/2021 12:00 AM    BILIDIR 0.2 12/29/2021 12:00 AM    LABALBU 4.0 12/29/2021 12:00 AM     Magnesium:    Lab Results   Component Value Date/Time    MG 2.1 12/21/2021 12:00 AM     PT/INR:    Lab Results   Component Value Date/Time    INR 0.88 12/06/2021 12:32 PM     Lipids:    Lab Results   Component Value Date/Time    TRIG 48 12/21/2021 12:00 AM    HDL 69 12/21/2021 12:00 AM    LDLCALC 51 12/21/2021 12:00 AM       ASSESSMENT AND PLAN:      Diagnosis Orders   1. CHF (congestive heart failure), NYHA class II, chronic, systolic (HCC)  Basic Metabolic Panel      2. Ischemic cardiomyopathy        3. Coronary artery disease involving native coronary artery of native heart without angina pectoris        4. Atrial fibrillation, unspecified type ([facility] [address]            Continue:   ACE/ARB/ARNI - Entresto 24/26 BID              BB - Toprol 100/day              Diuretic - Bumex 2/day and Kcl 20/day  AA - Aldactone 25/day - stopped d/t breast tenderness  SGLT2 -  Farxiga 10/day  Vasodilator - no  Other - Digoxin, Plavix, Eliquis, ASA (stop), Amio  HFrEF NYHA II - EF 25% improved 40-45%  ICM, tachy induced  Afib s/p DCCV 11/2021, s/p DCCV 2/8/22  CAD s/p PCI 12/2021    Stable, appears Euvolemic  Lab reviewed - stable  Repeat blood work in 2 months   BP/HR stable   No med changes today   Continue diet/fluid adherence  Continue daily wts. F/U w/ Cardiology  F/U in clinic in 8 mth    Tolerating above noted HF meds, no ill side effects noted. Will continue to monitor kidney function and electrolytes. Will optimize as tolerated. Pt is compliant w/ medications.     Total visit time of 30 minutes has been spent with patient on education of symptoms, management, medication, and plan of care; as well as review of chart: labs, ECHO, radiology reports, etc.   I personally spent more then 50% of the appt time face to face with the patient. Daily weights  Fluid restriction of 2 Liters per day  Limit sodium in diet to around 1748-5098 mg/day  Monitor BP  Activity as tolerated     Patient was instructed to call the [facility] for any changes in symptoms as noted in AVS.      No follow-ups on file. or sooner if needed     Patient given educational materials - see patient instructions. We discussed the importance of weighing oneself and recording daily. We also discussed the importance of a low sodium diet, higher sodium foods to avoid and better low sodium food options. Patient verbalizes understanding of plan of care using teach back method, and is agreeable to the treatment plan.        Electronically signed by JIA Dennis CNP on 8/23/2022 at 12:23 PM

## 2022-09-03 NOTE — PROGRESS NOTES
[facility] ()  [id number] [place] [address]  Dept: [phone]  CARDIAC ELECTROPHYSIOLOGY: FOLLOW UP NOTE  PATIENT DEMOGRAPHICS:  Date:   9/7/2022  Patient name:              Teresita Thomas  YOB: 1960  Sex:    male   MRN:   [id number]    PRIMARY CARE PHYSICIAN:   Donovan Wright DO    CARDIOLOGIST:  Elizabeth Rossi MD    REASON FOR CONSULTATION:  Follow up atrial fibrillation. HISTORY OF PRESENT ILLNESS:   61/M with PeAF on amiodarone and underwent DCCV on 2/8/2022. Here for a follow up visit. The patient is doing well. His left ventricular systolic function has improved following restoration of the sinus rhythm. No complaints today. No chest pain, shortness of breath, lower extremity swelling or syncope. Medical Hx: PeAF dx 11/2021 s/p DCCV 11/10/2021 and 2/8/2022, ICM dx 10/2021 (LVEF 15 =>20 =>45% after DCCV), CAD/PCI-mLAD (12/6/2021), HTN., heavy alcohol consumption and anxiety. REVIEW OF SYSTEMS:    Constitutional: Negative for chills and fever  HENT: Negative for congestion, sinus pressure, sneezing and sore throat. Eyes: Negative for pain, discharge, redness and itching. Respiratory: Negative for apnea, cough  Gastrointestinal: Negative for blood in stool, constipation, diarrhea   Endocrine: Negative for cold intolerance, heat intolerance, polydipsia. Genitourinary: Negative for dysuria, enuresis, flank pain and hematuria. Musculoskeletal: Negative for arthralgias, joint swelling and neck pain. Neurological: Negative for numbness and headaches. Psychiatric/Behavioral: Negative for agitation, confusion, decreased concentration and dysphoric mood.       PAST MEDICAL HISTORY:  Past Medical History:   Diagnosis Date    Anxiety     Arthritis     Atrial fibrillation (HCC)     CAD (coronary artery disease)     atrial fib    Cardiomyopathy ([address]     Hypertension        PSH:  Past Surgical History:   Procedure Laterality Date    CARDIOVERSION      CARPAL TUNNEL RELEASE Right     ROTATOR CUFF REPAIR      TONSILLECTOMY         FAMILY HISTORY:  No family history on file. SOCIAL HISTORY:  Social History     Socioeconomic History    Marital status:     Number of children: 5   Tobacco Use    Smoking status: Former     Packs/day: 0.25     Types: Cigarettes     Quit date: 10/24/2021     Years since quittin.8    Smokeless tobacco: Never   Substance and Sexual Activity    Alcohol use: Yes     Alcohol/week: 12.0 standard drinks     Types: 12 Cans of beer per week    Drug use: Not Currently        ALLERGY HISTORY:  No Known Allergies     MEDICATIONS:  Current Outpatient Medications   Medication Sig Dispense Refill    bumetanide (BUMEX) 2 MG tablet TAKE 1 TABLET BY MOUTH EVERY DAY 90 tablet 1    amiodarone (CORDARONE) 200 MG tablet TAKE 1 TABLET BY MOUTH EVERY DAY 30 tablet 2    metoprolol succinate (TOPROL XL) 100 MG extended release tablet TAKE 1 TABLET BY MOUTH EVERY DAY 30 tablet 5    dapagliflozin (FARXIGA) 10 MG tablet Take 1 tablet by mouth every morning 30 tablet 5    sacubitril-valsartan (ENTRESTO) 24-26 MG per tablet Take 1 tablet by mouth 2 times daily 60 tablet 5    rosuvastatin (CRESTOR) 40 MG tablet Take 1 tablet by mouth nightly 90 tablet 5    clopidogrel (PLAVIX) 75 MG tablet Take 1 tablet by mouth daily 90 tablet 3    digoxin (LANOXIN) 125 MCG tablet Take 1 tablet by mouth daily 90 tablet 3    apixaban (ELIQUIS) 5 MG TABS tablet Take 1 tablet by mouth 2 times daily 180 tablet 3    NICOTINE MINI 2 MG lozenge       sildenafil (VIAGRA) 50 MG tablet Take by mouth       nitroGLYCERIN (NITROSTAT) 0.4 MG SL tablet up to max of 3 total doses. If no relief after 1 dose, call 911. 25 tablet 3    diazePAM (VALIUM) 5 MG tablet Take 2.5 mg by mouth as needed for Anxiety. Cholecalciferol (D3 VITAMIN PO) Take 1 capsule by mouth daily       No current facility-administered medications for this visit.        PHYSICAL EXAM:  Vitals:    09/07/22 1008   BP: (!) 109/55   Pulse: 56     Body mass index is 19.39 kg/m². GENERAL: Alert and oriented. No distress. EYES: No pallor or icterus. ENT: No cyanosis. VESSELS: No jugular venous distension or carotid bruits. HEART: Normal and regular S1/S2. No murmur, rub or gallop. LUNGS: Clear to auscultation. ABDOMEN: Soft and non-tender. CHEST Wearing LifeVest.  EXTREMITIES: No lower extremity edema. NEUROLOGICAL: Grossly normal.     LABORATORY DATA AND DIAGNOSTIC DATA:  I have personally reviewed and interpreted the results of the following diagnostic testing    Lab Results   Component Value Date    WBC 7.0 02/08/2022    HGB 14.1 02/08/2022    HCT 42.4 02/08/2022     02/08/2022    CHOL 130 12/21/2021    TRIG 48 12/21/2021    HDL 69 (H) 12/21/2021    ALT 19 12/29/2021    AST 25 12/29/2021     (L) 05/17/2022    K 3.8 05/17/2022    CL 94 (L) 05/17/2022    CREATININE 1.0 05/17/2022    BUN 9 05/17/2022    CO2 31 (H) 05/17/2022    TSH 1.13 05/17/2022    INR 0.88 12/06/2021     Echocardiogram ([facility]) 11/10/2021: LVEF 25%. Mild mitral regurgitation. ECG 12/13/2021: Atrial fibrillation, controlled ventricular rate and narrow QRS. ECG 12/22/2021: Atrial fibrillation with controlled ventricular rate. ECG 3/9/2020: Sinus rhythm. Normal QRS duration QTc interval.  Coronary angiogram 12/6/2021: PCI to mid LAD. IMPRESSION:  PeAF. In sinus rhythm. [id number] score 3 (HTN, CHF and CAD). On amiodarone and apixaban. ICM, LVEF improved, 45%, NYHA III.  CAD/PCI-mLAD (12/6/2021) on Clopidogrel. HTN, controlled. ASSESSMENT AND RECOMMENDATIONS:  The patient is doing well. In sinus rhythm. Continue current medications. Patient is tolerating amiodarone. Follow-up in 1 year with LFT, TSH and PFTs. Thank you for allowing me to participate in the care of your patient. Please call me if you have any questions.       **This report has been created using voice recognition software. It may contain minor errors which are inherent in voice recognition technology. **       Mary Solares MD, MRCP, [facility] [place], [facility] on 9/7/2022 at 10:30 AM

## 2022-09-07 ENCOUNTER — OFFICE VISIT (OUTPATIENT)
Dept: CARDIOLOGY CLINIC | Age: 62
End: 2022-09-07
Payer: COMMERCIAL

## 2022-09-07 VITALS
HEART RATE: 56 BPM | HEIGHT: 72 IN | DIASTOLIC BLOOD PRESSURE: 55 MMHG | WEIGHT: 143 LBS | SYSTOLIC BLOOD PRESSURE: 109 MMHG | BODY MASS INDEX: 19.37 KG/M2

## 2022-09-07 DIAGNOSIS — Z79.899 ON AMIODARONE THERAPY: ICD-10-CM

## 2022-09-07 DIAGNOSIS — I48.21 PERMANENT ATRIAL FIBRILLATION (HCC): ICD-10-CM

## 2022-09-07 DIAGNOSIS — I25.10 CAD IN NATIVE ARTERY: ICD-10-CM

## 2022-09-07 DIAGNOSIS — I42.0 DILATED CARDIOMYOPATHY (HCC): Primary | ICD-10-CM

## 2022-09-07 DIAGNOSIS — R42 DIZZINESS: ICD-10-CM

## 2022-09-07 PROCEDURE — 99214 OFFICE O/P EST MOD 30 MIN: CPT | Performed by: INTERNAL MEDICINE

## 2022-09-07 PROCEDURE — 93000 ELECTROCARDIOGRAM COMPLETE: CPT | Performed by: INTERNAL MEDICINE

## 2022-09-07 PROCEDURE — 4004F PT TOBACCO SCREEN RCVD TLK: CPT | Performed by: INTERNAL MEDICINE

## 2022-09-07 PROCEDURE — G8420 CALC BMI NORM PARAMETERS: HCPCS | Performed by: INTERNAL MEDICINE

## 2022-09-07 PROCEDURE — G8427 DOCREV CUR MEDS BY ELIG CLIN: HCPCS | Performed by: INTERNAL MEDICINE

## 2022-09-07 PROCEDURE — 3017F COLORECTAL CA SCREEN DOC REV: CPT | Performed by: INTERNAL MEDICINE

## 2022-09-15 RX ORDER — AMIODARONE HYDROCHLORIDE 200 MG/1
TABLET ORAL
Qty: 90 TABLET | Refills: 0 | Status: SHIPPED | OUTPATIENT
Start: 2022-09-15

## 2022-09-26 RX ORDER — SACUBITRIL AND VALSARTAN 24; 26 MG/1; MG/1
TABLET, FILM COATED ORAL
Qty: 60 TABLET | Refills: 5 | Status: SHIPPED | OUTPATIENT
Start: 2022-09-26

## 2022-10-18 LAB
BUN BLDV-MCNC: 12 MG/DL
CALCIUM SERPL-MCNC: 9.5 MG/DL
CHLORIDE BLD-SCNC: 97 MMOL/L
CO2: 29 MMOL/L
CREAT SERPL-MCNC: 1.2 MG/DL
GFR CALCULATED: 64
GLUCOSE BLD-MCNC: 139 MG/DL
POTASSIUM SERPL-SCNC: 3.7 MMOL/L
SODIUM BLD-SCNC: 137 MMOL/L

## 2022-10-20 RX ORDER — NITROGLYCERIN 0.4 MG/1
TABLET SUBLINGUAL
Qty: 25 TABLET | Refills: 3 | Status: SHIPPED | OUTPATIENT
Start: 2022-10-20

## 2022-10-20 NOTE — TELEPHONE ENCOUNTER
Patient LMOM last PM that he was having CP. Returned call to patient this morning. Said CP on right side lasted about 5 mins, did not have to take Nitro and went away. Instructed patient if it returns, he can try Nitro or go to ER if persistent. 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 Also instructed if it comes back again, can move cardiology appt to sooner.

## 2022-10-24 ENCOUNTER — OFFICE VISIT (OUTPATIENT)
Dept: CARDIOLOGY CLINIC | Age: 62
End: 2022-10-24
Payer: COMMERCIAL

## 2022-10-24 VITALS
BODY MASS INDEX: 19.91 KG/M2 | HEIGHT: 72 IN | WEIGHT: 147 LBS | DIASTOLIC BLOOD PRESSURE: 72 MMHG | SYSTOLIC BLOOD PRESSURE: 122 MMHG | HEART RATE: 64 BPM

## 2022-10-24 DIAGNOSIS — I25.5 ISCHEMIC CARDIOMYOPATHY: ICD-10-CM

## 2022-10-24 DIAGNOSIS — I25.10 CORONARY ARTERY DISEASE INVOLVING NATIVE CORONARY ARTERY OF NATIVE HEART WITHOUT ANGINA PECTORIS: ICD-10-CM

## 2022-10-24 DIAGNOSIS — I10 PRIMARY HYPERTENSION: ICD-10-CM

## 2022-10-24 DIAGNOSIS — I48.0 PAROXYSMAL ATRIAL FIBRILLATION (HCC): Primary | ICD-10-CM

## 2022-10-24 PROCEDURE — G8420 CALC BMI NORM PARAMETERS: HCPCS | Performed by: NUCLEAR MEDICINE

## 2022-10-24 PROCEDURE — 4004F PT TOBACCO SCREEN RCVD TLK: CPT | Performed by: NUCLEAR MEDICINE

## 2022-10-24 PROCEDURE — G8484 FLU IMMUNIZE NO ADMIN: HCPCS | Performed by: NUCLEAR MEDICINE

## 2022-10-24 PROCEDURE — G8427 DOCREV CUR MEDS BY ELIG CLIN: HCPCS | Performed by: NUCLEAR MEDICINE

## 2022-10-24 PROCEDURE — 3017F COLORECTAL CA SCREEN DOC REV: CPT | Performed by: NUCLEAR MEDICINE

## 2022-10-24 PROCEDURE — 99214 OFFICE O/P EST MOD 30 MIN: CPT | Performed by: NUCLEAR MEDICINE

## 2022-10-24 NOTE — PROGRESS NOTES
[address]  [address] [place] [address]  Dept: [phone]  Dept Fax: [phone]  Loc: [phone]    Visit Date: 10/24/2022    Phyllis Leonard is a 59 y.o. male who presents todayfor:  Chief Complaint   Patient presents with    Check-Up    Coronary Artery Disease    Atrial Fibrillation    Hypertension     Improved Ef to 45% from almost 10%  Did well in rhythm   A fib is stable for now   On amiodarone  Some have some dizziness  Usually orthostatic   No syncope  Marvin is lower at times  No tachycardia  Does have LAD stent   No chest pain   No changes in breathing  On statins for hyperlipidemia  No issues   HPI:  HPI  Past Medical History:   Diagnosis Date    Anxiety     Arthritis     Atrial fibrillation (HCC)     CAD (coronary artery disease)     atrial fib    Cardiomyopathy (HCC)     Hypertension       Past Surgical History:   Procedure Laterality Date    CARDIOVERSION      CARPAL TUNNEL RELEASE Right     ROTATOR CUFF REPAIR      TONSILLECTOMY       Family History   Problem Relation Age of Onset    Heart Attack Father      Social History     Tobacco Use    Smoking status: Some Days     Packs/day: 0.25     Types: Cigarettes    Smokeless tobacco: Never   Substance Use Topics    Alcohol use: Yes     Alcohol/week: 12.0 standard drinks     Types: 12 Cans of beer per week      Current Outpatient Medications   Medication Sig Dispense Refill    nitroGLYCERIN (NITROSTAT) 0.4 MG SL tablet up to max of 3 total doses.  If no relief after 1 dose, call 911. 25 tablet 3    ENTRESTO 24-26 MG per tablet TAKE 1 TABLET BY MOUTH TWICE A DAY 60 tablet 5    amiodarone (CORDARONE) 200 MG tablet TAKE 1 TABLET BY MOUTH EVERY DAY 90 tablet 0    bumetanide (BUMEX) 2 MG tablet TAKE 1 TABLET BY MOUTH EVERY DAY 90 tablet 1    metoprolol succinate (TOPROL XL) 100 MG extended release tablet TAKE 1 TABLET BY MOUTH EVERY DAY 30 tablet 5    dapagliflozin (FARXIGA) 10 MG tablet Take 1 tablet by mouth every morning 30 tablet 5    rosuvastatin (CRESTOR) 40 MG tablet Take 1 tablet by mouth nightly 90 tablet 5    clopidogrel (PLAVIX) 75 MG tablet Take 1 tablet by mouth daily 90 tablet 3    digoxin (LANOXIN) 125 MCG tablet Take 1 tablet by mouth daily 90 tablet 3    apixaban (ELIQUIS) 5 MG TABS tablet Take 1 tablet by mouth 2 times daily 180 tablet 3    NICOTINE MINI 2 MG lozenge       sildenafil (VIAGRA) 50 MG tablet Take by mouth       diazePAM (VALIUM) 5 MG tablet Take 2.5 mg by mouth as needed for Anxiety. Cholecalciferol (D3 VITAMIN PO) Take 1 capsule by mouth daily       No current facility-administered medications for this visit. No Known Allergies  Health Maintenance   Topic Date Due    COVID-19 Vaccine (1) Never done    Pneumococcal 0-64 years Vaccine (1 - PCV) Never done    Depression Screen  Never done    HIV screen  Never done    Hepatitis C screen  Never done    Colorectal Cancer Screen  Never done    Shingles vaccine (1 of 2) Never done    Flu vaccine (1) Never done    Lipids  12/21/2022    DTaP/Tdap/Td vaccine (2 - Td or Tdap) 07/15/2032    Hepatitis A vaccine  Aged Out    Hib vaccine  Aged Out    Meningococcal (ACWY) vaccine  Aged Out       Subjective:  Review of Systems  General:   No fever, no chills, No fatigue or weight loss  Pulmonary:    No dyspnea, no wheezing  Cardiac:    Denies recent chest pain,   GI:     No nausea or vomiting, no abdominal pain  Neuro:     some dizziness or light headedness,   Musculoskeletal:  No recent active issues  Extremities:   No edema, no obvious claudication     Objective:  Physical Exam  /72   Pulse 64   Ht 6' (1.829 m)   Wt 147 lb (66.7 kg)   BMI 19.94 kg/m²   General:   Well developed, well nourished  Lungs:    Clear to auscultation  Heart:    Normal S1 S2, Slight murmur.  no rubs, no gallops  Abdomen:   Soft, non tender, no organomegalies, positive bowel sounds  Extremities:   No edema, no cyanosis, good peripheral pulses  Neurological:   Awake, alert, oriented. No obvious focal deficits  Musculoskelatal:  No obvious deformities    Assessment:      Diagnosis Orders   1. Paroxysmal atrial fibrillation (HCC)        2. Ischemic cardiomyopathy        3. Coronary artery disease involving native coronary artery of native heart without angina pectoris        4. Primary hypertension        As above  Orthostatic likely due to BP and meds  No active angina     Plan:  No follow-ups on file. Discussed  Consider lower dose meds if needed  Monitor the BP. Continue risk factor modification and medical management    Thank you for allowing me to participate in the care of your patient. Please don't hesitate to contact me regarding any further issues related to the patient care    Orders Placed:  No orders of the defined types were placed in this encounter. Medications Prescribed:  No orders of the defined types were placed in this encounter. Discussed use, benefit, and side effects of prescribed medications. All patient questions answered. Pt voicedunderstanding. Instructed to continue current medications, diet and exercise. Continue risk factor modification and medical management. Patient agreed with treatment plan. Follow up as directed.     Electronically signedby Lizy Dobbs MD on 10/24/2022 at 9:04 AM

## 2022-11-14 RX ORDER — METOPROLOL SUCCINATE 100 MG/1
TABLET, EXTENDED RELEASE ORAL
Qty: 90 TABLET | Refills: 3 | Status: SHIPPED | OUTPATIENT
Start: 2022-11-14

## 2022-11-28 RX ORDER — DAPAGLIFLOZIN 10 MG/1
TABLET, FILM COATED ORAL
Qty: 30 TABLET | Refills: 5 | Status: SHIPPED | OUTPATIENT
Start: 2022-11-28

## 2022-12-12 RX ORDER — AMIODARONE HYDROCHLORIDE 200 MG/1
TABLET ORAL
Qty: 90 TABLET | Refills: 3 | Status: SHIPPED | OUTPATIENT
Start: 2022-12-12

## 2022-12-19 RX ORDER — APIXABAN 5 MG/1
TABLET, FILM COATED ORAL
Qty: 60 TABLET | Refills: 11 | Status: SHIPPED | OUTPATIENT
Start: 2022-12-19

## 2022-12-21 RX ORDER — BUMETANIDE 2 MG/1
TABLET ORAL
Qty: 90 TABLET | Refills: 3 | Status: SHIPPED | OUTPATIENT
Start: 2022-12-21

## 2022-12-21 RX ORDER — DIGOXIN 125 MCG
TABLET ORAL
Qty: 90 TABLET | Refills: 3 | Status: SHIPPED | OUTPATIENT
Start: 2022-12-21

## 2022-12-21 RX ORDER — CLOPIDOGREL BISULFATE 75 MG/1
TABLET ORAL
Qty: 90 TABLET | Refills: 3 | Status: SHIPPED | OUTPATIENT
Start: 2022-12-21

## 2023-02-22 RX ORDER — ROSUVASTATIN CALCIUM 40 MG/1
40 TABLET, COATED ORAL NIGHTLY
Qty: 90 TABLET | Refills: 2 | Status: SHIPPED | OUTPATIENT
Start: 2023-02-22

## 2023-03-20 RX ORDER — SACUBITRIL AND VALSARTAN 24; 26 MG/1; MG/1
TABLET, FILM COATED ORAL
Qty: 60 TABLET | Refills: 5 | Status: SHIPPED | OUTPATIENT
Start: 2023-03-20

## 2023-03-28 ENCOUNTER — OFFICE VISIT (OUTPATIENT)
Dept: CARDIOLOGY CLINIC | Age: 63
End: 2023-03-28
Payer: COMMERCIAL

## 2023-03-28 VITALS
SYSTOLIC BLOOD PRESSURE: 126 MMHG | HEART RATE: 57 BPM | WEIGHT: 139 LBS | BODY MASS INDEX: 18.85 KG/M2 | OXYGEN SATURATION: 97 % | DIASTOLIC BLOOD PRESSURE: 70 MMHG

## 2023-03-28 DIAGNOSIS — I25.5 ISCHEMIC CARDIOMYOPATHY: ICD-10-CM

## 2023-03-28 DIAGNOSIS — F17.210 CIGARETTE NICOTINE DEPENDENCE WITHOUT COMPLICATION: ICD-10-CM

## 2023-03-28 DIAGNOSIS — I50.22 CHF (CONGESTIVE HEART FAILURE), NYHA CLASS II, CHRONIC, SYSTOLIC (HCC): Primary | ICD-10-CM

## 2023-03-28 DIAGNOSIS — I25.10 CORONARY ARTERY DISEASE INVOLVING NATIVE CORONARY ARTERY OF NATIVE HEART WITHOUT ANGINA PECTORIS: ICD-10-CM

## 2023-03-28 PROCEDURE — 99214 OFFICE O/P EST MOD 30 MIN: CPT | Performed by: NURSE PRACTITIONER

## 2023-03-28 PROCEDURE — G8420 CALC BMI NORM PARAMETERS: HCPCS | Performed by: NURSE PRACTITIONER

## 2023-03-28 PROCEDURE — G8484 FLU IMMUNIZE NO ADMIN: HCPCS | Performed by: NURSE PRACTITIONER

## 2023-03-28 PROCEDURE — 4004F PT TOBACCO SCREEN RCVD TLK: CPT | Performed by: NURSE PRACTITIONER

## 2023-03-28 PROCEDURE — 3017F COLORECTAL CA SCREEN DOC REV: CPT | Performed by: NURSE PRACTITIONER

## 2023-03-28 PROCEDURE — G8427 DOCREV CUR MEDS BY ELIG CLIN: HCPCS | Performed by: NURSE PRACTITIONER

## 2023-03-28 RX ORDER — BUMETANIDE 1 MG/1
1 TABLET ORAL DAILY
Qty: 90 TABLET | Refills: 3 | Status: SHIPPED | OUTPATIENT
Start: 2023-03-28

## 2023-03-28 ASSESSMENT — ENCOUNTER SYMPTOMS
SHORTNESS OF BREATH: 0
COUGH: 0
ABDOMINAL DISTENTION: 0

## 2023-03-28 NOTE — PATIENT INSTRUCTIONS
You may receive a survey regarding the care you received during your visit. Your input is valuable to us. We encourage you to complete and return your survey. We hope you will choose us in the future for your healthcare needs.     Continue:  Continue current medications  Daily weights and record  Fluid restriction of 2 Liters per day  Limit sodium in diet to around 0933-3692 mg/day  Monitor BP  Activity as tolerated     Call the Heart Failure Clinic for any of the following symptoms: [phone]  Weight gain of 2-3 pounds in 1 day or 5 pounds in 1 week  Increased shortness of breath  Shortness of breath while laying down  Cough  Chest pain  Swelling in feet, ankles or legs  Tenderness or bloating in the abdomen  Fatigue   Decreased appetite or nausea   Confusion

## 2023-03-28 NOTE — PROGRESS NOTES
reduced. Ejection fraction is visually estimated at 40-45%. Normal right ventricular size and function. No evidence of any pericardial effusion. Signature      ----------------------------------------------------------------   Electronically signed by Cara Gómez MD (Interpreting   physician) on 03/09/2022 at 10:45 AM   ----------------------------------------------------------------    ECHO:    Summary   Ejection fraction is visually estimated at 25%. There was severe global hypokinesis of the left ventricle. Mild mitral regurgitation is present. Signature      ----------------------------------------------------------------   Electronically signed by Jose Wang MD (Interpreting   physician) on 11/10/2021 at 04:08 PM        CATH/STRESS:   IMPRESSION:  1. Ischemic cardiomyopathy. 2.  Coronary artery disease. 3.  Severely stenotic lesion of mid segment of left anterior descending  artery, status post successful PCI and stenting. PLAN:  The patient will be admitted to hospital for observation post PCI  of LAD. Aggressive risk factor modification. Dual-antiplatelet therapy  with aspirin and Plavix. High intensity statin therapy. Guideline-directed medical therapy for congestive heart failure with  reduced ejection fraction. May resume oral anticoagulation with Eliquis  tomorrow if condition remains stable. The patient is on Eliquis for  newly diagnosed atrial fibrillation.      Leoncio Henderson MD     D: 12/06/2021 15:20:32       Results reviewed:  BNP: No results found for: BNP  CBC:   Lab Results   Component Value Date/Time    WBC 7.0 02/08/2022 09:19 AM    RBC 4.12 02/08/2022 09:19 AM    HGB 14.1 02/08/2022 09:19 AM    HCT 42.4 02/08/2022 09:19 AM     02/08/2022 09:19 AM     CMP:    Lab Results   Component Value Date/Time     10/18/2022 12:00 AM    K 3.7 10/18/2022 12:00 AM    CL 97 10/18/2022 12:00 AM    CO2 29 10/18/2022 12:00 AM    BUN 12 10/18/2022 12:00 AM

## 2023-05-15 RX ORDER — DAPAGLIFLOZIN 10 MG/1
TABLET, FILM COATED ORAL
Qty: 30 TABLET | Refills: 12 | Status: SHIPPED | OUTPATIENT
Start: 2023-05-15

## 2023-08-18 ENCOUNTER — HOSPITAL ENCOUNTER (OUTPATIENT)
Dept: PULMONOLOGY | Age: 63
Discharge: HOME | End: 2023-08-18
Attending: INTERNAL MEDICINE
Payer: COMMERCIAL

## 2023-08-18 DIAGNOSIS — Z79.899 ON AMIODARONE THERAPY: ICD-10-CM

## 2023-08-18 PROCEDURE — 94729 DIFFUSING CAPACITY: CPT

## 2023-08-18 PROCEDURE — 94010 BREATHING CAPACITY TEST: CPT

## 2023-08-18 PROCEDURE — 94726 PLETHYSMOGRAPHY LUNG VOLUMES: CPT

## 2023-10-16 RX ORDER — SACUBITRIL AND VALSARTAN 24; 26 MG/1; MG/1
TABLET, FILM COATED ORAL
Qty: 60 TABLET | Refills: 12 | Status: SHIPPED | OUTPATIENT
Start: 2023-10-16

## 2023-10-31 ENCOUNTER — OFFICE VISIT (OUTPATIENT)
Dept: CARDIOLOGY CLINIC | Age: 63
End: 2023-10-31
Payer: COMMERCIAL

## 2023-10-31 VITALS
HEIGHT: 72 IN | WEIGHT: 133.2 LBS | HEART RATE: 48 BPM | SYSTOLIC BLOOD PRESSURE: 138 MMHG | DIASTOLIC BLOOD PRESSURE: 78 MMHG | BODY MASS INDEX: 18.04 KG/M2

## 2023-10-31 DIAGNOSIS — I10 PRIMARY HYPERTENSION: ICD-10-CM

## 2023-10-31 DIAGNOSIS — E78.01 FAMILIAL HYPERCHOLESTEROLEMIA: ICD-10-CM

## 2023-10-31 DIAGNOSIS — I25.10 CORONARY ARTERY DISEASE INVOLVING NATIVE CORONARY ARTERY OF NATIVE HEART WITHOUT ANGINA PECTORIS: Primary | ICD-10-CM

## 2023-10-31 DIAGNOSIS — I42.0 DILATED CARDIOMYOPATHY (HCC): ICD-10-CM

## 2023-10-31 DIAGNOSIS — I48.0 PAROXYSMAL ATRIAL FIBRILLATION (HCC): ICD-10-CM

## 2023-10-31 PROCEDURE — G8428 CUR MEDS NOT DOCUMENT: HCPCS | Performed by: NUCLEAR MEDICINE

## 2023-10-31 PROCEDURE — 3078F DIAST BP <80 MM HG: CPT | Performed by: NUCLEAR MEDICINE

## 2023-10-31 PROCEDURE — 99214 OFFICE O/P EST MOD 30 MIN: CPT | Performed by: NUCLEAR MEDICINE

## 2023-10-31 PROCEDURE — 4004F PT TOBACCO SCREEN RCVD TLK: CPT | Performed by: NUCLEAR MEDICINE

## 2023-10-31 PROCEDURE — 3017F COLORECTAL CA SCREEN DOC REV: CPT | Performed by: NUCLEAR MEDICINE

## 2023-10-31 PROCEDURE — G8419 CALC BMI OUT NRM PARAM NOF/U: HCPCS | Performed by: NUCLEAR MEDICINE

## 2023-10-31 PROCEDURE — G8484 FLU IMMUNIZE NO ADMIN: HCPCS | Performed by: NUCLEAR MEDICINE

## 2023-10-31 PROCEDURE — 3075F SYST BP GE 130 - 139MM HG: CPT | Performed by: NUCLEAR MEDICINE

## 2023-10-31 NOTE — PROGRESS NOTES
[facility]  [address] . [address] [place] [address]  Dept: [phone]  Dept Fax: [phone]  Loc: [phone]    Visit Date: 10/31/2023    Bryanna Cole is a 57 y.o. male who presents todayfor:  Chief Complaint   Patient presents with    Hypertension    Atrial Fibrillation    Coronary Artery Disease    Cardiomyopathy    Hyperlipidemia   Previous LAd stent  No chest pain   Some baseline dyspnea  Improved EF   CHF is stable  Some dizziness  No syncope  Does have HTN   On medical RX  Seems stable  A fib seems stable  No bleeding   On statins for hyperlipidemia        HPI:  HPI  Past Medical History:   Diagnosis Date    Anxiety     Arthritis     Atrial fibrillation (HCC)     CAD (coronary artery disease)     atrial fib    Cardiomyopathy (HCC)     Hypertension       Past Surgical History:   Procedure Laterality Date    CARDIOVERSION      CARPAL TUNNEL RELEASE Right     ROTATOR CUFF REPAIR      TONSILLECTOMY       Family History   Problem Relation Age of Onset    Heart Attack Father      Social History     Tobacco Use    Smoking status: Some Days     Packs/day: 0.25     Years: 40.00     Additional pack years: 0.00     Total pack years: 10.00     Types: Cigarettes     Start date: 24    Smokeless tobacco: Never   Substance Use Topics    Alcohol use:  Yes     Alcohol/week: 12.0 standard drinks of alcohol     Types: 12 Cans of beer per week      Current Outpatient Medications   Medication Sig Dispense Refill    ENTRESTO 24-26 MG per tablet TAKE 1 TABLET BY MOUTH TWICE A DAY 60 tablet 12    FARXIGA 10 MG tablet TAKE 1 TABLET BY MOUTH EVERY DAY IN THE MORNING 30 tablet 12    bumetanide (BUMEX) 1 MG tablet Take 1 tablet by mouth daily 90 tablet 3    rosuvastatin (CRESTOR) 40 MG tablet TAKE 1 TABLET BY MOUTH NIGHTLY 90 tablet 2    clopidogrel (PLAVIX) 75 MG tablet TAKE 1 TABLET BY MOUTH EVERY DAY 90 tablet 3    digoxin (LANOXIN) 125 MCG tablet TAKE 1 TABLET

## 2023-11-08 ENCOUNTER — OFFICE VISIT (OUTPATIENT)
Dept: CARDIOLOGY CLINIC | Age: 63
End: 2023-11-08
Payer: COMMERCIAL

## 2023-11-08 VITALS
SYSTOLIC BLOOD PRESSURE: 125 MMHG | DIASTOLIC BLOOD PRESSURE: 70 MMHG | BODY MASS INDEX: 18.69 KG/M2 | WEIGHT: 138 LBS | OXYGEN SATURATION: 98 % | HEIGHT: 72 IN | HEART RATE: 51 BPM

## 2023-11-08 DIAGNOSIS — I48.0 PAROXYSMAL ATRIAL FIBRILLATION (HCC): Primary | ICD-10-CM

## 2023-11-08 PROCEDURE — G8427 DOCREV CUR MEDS BY ELIG CLIN: HCPCS | Performed by: INTERNAL MEDICINE

## 2023-11-08 PROCEDURE — 93000 ELECTROCARDIOGRAM COMPLETE: CPT | Performed by: INTERNAL MEDICINE

## 2023-11-08 PROCEDURE — 3017F COLORECTAL CA SCREEN DOC REV: CPT | Performed by: INTERNAL MEDICINE

## 2023-11-08 PROCEDURE — G8420 CALC BMI NORM PARAMETERS: HCPCS | Performed by: INTERNAL MEDICINE

## 2023-11-08 PROCEDURE — 4004F PT TOBACCO SCREEN RCVD TLK: CPT | Performed by: INTERNAL MEDICINE

## 2023-11-08 PROCEDURE — G8484 FLU IMMUNIZE NO ADMIN: HCPCS | Performed by: INTERNAL MEDICINE

## 2023-11-08 PROCEDURE — 99214 OFFICE O/P EST MOD 30 MIN: CPT | Performed by: INTERNAL MEDICINE

## 2023-11-08 NOTE — PATIENT INSTRUCTIONS
You may receive a survey regarding the care you received during your visit. Your input is valuable to us. We encourage you to complete and return your survey. We hope you will choose us in the future for your healthcare needs. Thank you for choosing [facility]!     Your Medical Assistant Today:  Desire Weaver      Your RN Today:  Maynor Tai  Your Provider for Today: Dr. Christen Tate  Ph. [phone] opt 1

## 2023-11-08 NOTE — PROGRESS NOTES
[place] [id number]  Dept: [phone]  CARDIAC ELECTROPHYSIOLOGY: FOLLOW UP NOTE  PATIENT DEMOGRAPHICS:  Date:   11/8/2023  Patient name:              Maurice Diana  YOB: 1960  Sex:    male   MRN:   [id number]    PRIMARY CARE PHYSICIAN:   Yanique Moody DO    CARDIOLOGIST:  Verona Peralta MD    REASON FOR CONSULTATION:  Annual follow up. HISTORY OF PRESENT ILLNESS:   61/M with PeAF on amiodarone and underwent DCCV on 2/8/2022. Here for a follow up visit. Doing good. Occasional lightheadedness. No palpitations, chest pain or shortness of breath. Tolerating medications. Medical Hx: PeAF dx 11/2021 s/p DCCV 11/10/2021 and 2/8/2022, ICM dx 10/2021 (LVEF 15 =>20 =>45% after DCCV), CAD/PCI-mLAD (12/6/2021), HTN., heavy alcohol consumption and anxiety. REVIEW OF SYSTEMS:    Constitutional: Negative for chills and fever  HENT: Negative for congestion, sinus pressure, sneezing and sore throat. Eyes: Negative for pain, discharge, redness and itching. Respiratory: Negative for apnea, cough  Gastrointestinal: Negative for blood in stool, constipation, diarrhea   Endocrine: Negative for cold intolerance, heat intolerance, polydipsia. Genitourinary: Negative for dysuria, enuresis, flank pain and hematuria. Musculoskeletal: Negative for arthralgias, joint swelling and neck pain. Neurological: Negative for numbness and headaches. Psychiatric/Behavioral: Negative for agitation, confusion, decreased concentration and dysphoric mood.       PAST MEDICAL HISTORY:  Past Medical History:   Diagnosis Date    Anxiety     Arthritis     Atrial fibrillation (HCC)     CAD (coronary artery disease)     atrial fib    Cardiomyopathy (HCC)     Hypertension        PSH:  Past Surgical History:   Procedure Laterality Date    CARDIOVERSION      CARPAL TUNNEL RELEASE Right     ROTATOR CUFF REPAIR      TONSILLECTOMY         FAMILY

## 2023-11-13 RX ORDER — ROSUVASTATIN CALCIUM 40 MG/1
40 TABLET, COATED ORAL NIGHTLY
Qty: 90 TABLET | Refills: 2 | Status: SHIPPED | OUTPATIENT
Start: 2023-11-13

## 2024-01-12 RX ORDER — APIXABAN 5 MG/1
TABLET, FILM COATED ORAL
Qty: 180 TABLET | Refills: 3 | Status: SHIPPED | OUTPATIENT
Start: 2024-01-12

## 2024-02-06 RX ORDER — BUMETANIDE 1 MG/1
1 TABLET ORAL DAILY
Qty: 90 TABLET | Refills: 3 | Status: SHIPPED | OUTPATIENT
Start: 2024-02-06

## 2024-02-12 RX ORDER — METOPROLOL SUCCINATE 100 MG/1
100 TABLET, EXTENDED RELEASE ORAL DAILY
Qty: 90 TABLET | Refills: 3 | Status: SHIPPED | OUTPATIENT
Start: 2024-02-12

## 2024-02-12 RX ORDER — AMIODARONE HYDROCHLORIDE 200 MG/1
200 TABLET ORAL DAILY
Qty: 90 TABLET | Refills: 3 | Status: SHIPPED | OUTPATIENT
Start: 2024-02-12

## 2024-02-12 RX ORDER — CLOPIDOGREL BISULFATE 75 MG/1
75 TABLET ORAL DAILY
Qty: 90 TABLET | Refills: 3 | Status: SHIPPED | OUTPATIENT
Start: 2024-02-12

## 2024-02-12 NOTE — TELEPHONE ENCOUNTER
Patient called in needing refills.  Has not picked up Digoxin or Amiodarone since Sept 2023    Last seen  Dr Chaidez 11/2023-  Please advise if patient needs to continue on meds

## 2024-02-13 RX ORDER — SACUBITRIL AND VALSARTAN 24; 26 MG/1; MG/1
1 TABLET, FILM COATED ORAL 2 TIMES DAILY
Qty: 60 TABLET | Refills: 12 | Status: SHIPPED | OUTPATIENT
Start: 2024-02-13

## 2024-03-15 ENCOUNTER — TELEPHONE (OUTPATIENT)
Dept: CARDIOLOGY CLINIC | Age: 64
End: 2024-03-15

## 2024-03-15 NOTE — TELEPHONE ENCOUNTER
Insurance will not pay for [facility] pharmacy requesting change to jardiance. Ok?

## 2024-03-26 ENCOUNTER — OFFICE VISIT (OUTPATIENT)
Dept: CARDIOLOGY CLINIC | Age: 64
End: 2024-03-26
Payer: MEDICARE

## 2024-03-26 VITALS
OXYGEN SATURATION: 96 % | SYSTOLIC BLOOD PRESSURE: 170 MMHG | HEIGHT: 72 IN | WEIGHT: 146.4 LBS | BODY MASS INDEX: 19.83 KG/M2 | DIASTOLIC BLOOD PRESSURE: 80 MMHG | RESPIRATION RATE: 18 BRPM | HEART RATE: 62 BPM

## 2024-03-26 DIAGNOSIS — I48.0 PAROXYSMAL ATRIAL FIBRILLATION (HCC): ICD-10-CM

## 2024-03-26 DIAGNOSIS — I42.0 DILATED CARDIOMYOPATHY (HCC): ICD-10-CM

## 2024-03-26 DIAGNOSIS — I25.10 CORONARY ARTERY DISEASE INVOLVING NATIVE CORONARY ARTERY OF NATIVE HEART WITHOUT ANGINA PECTORIS: ICD-10-CM

## 2024-03-26 DIAGNOSIS — I50.22 CHF (CONGESTIVE HEART FAILURE), NYHA CLASS II, CHRONIC, SYSTOLIC (HCC): Primary | ICD-10-CM

## 2024-03-26 PROCEDURE — G8420 CALC BMI NORM PARAMETERS: HCPCS | Performed by: NURSE PRACTITIONER

## 2024-03-26 PROCEDURE — 1123F ACP DISCUSS/DSCN MKR DOCD: CPT | Performed by: NURSE PRACTITIONER

## 2024-03-26 PROCEDURE — G8484 FLU IMMUNIZE NO ADMIN: HCPCS | Performed by: NURSE PRACTITIONER

## 2024-03-26 PROCEDURE — G8427 DOCREV CUR MEDS BY ELIG CLIN: HCPCS | Performed by: NURSE PRACTITIONER

## 2024-03-26 PROCEDURE — 3017F COLORECTAL CA SCREEN DOC REV: CPT | Performed by: NURSE PRACTITIONER

## 2024-03-26 PROCEDURE — 4004F PT TOBACCO SCREEN RCVD TLK: CPT | Performed by: NURSE PRACTITIONER

## 2024-03-26 PROCEDURE — 99214 OFFICE O/P EST MOD 30 MIN: CPT | Performed by: NURSE PRACTITIONER

## 2024-03-26 ASSESSMENT — ENCOUNTER SYMPTOMS
ABDOMINAL DISTENTION: 0
COUGH: 0
SHORTNESS OF BREATH: 0

## 2024-03-26 NOTE — PROGRESS NOTES
work in next month - BMP  BP/HR stable   Continue diet/fluid adherence  Continue daily wts.  F/U w/ Cardiology  F/U in clinic in 1 yr    Tolerating above noted HF meds, no ill side effects noted. Will continue to monitor kidney function and electrolytes. Will optimize as tolerated.   Pt is compliant w/ medications.    Total visit time of 30 minutes has been spent with patient on education of symptoms, management, medication, and plan of care; as well as review of chart: labs, ECHO, radiology reports, etc.   I personally spent more then 50% of the appt time face to face with the patient.  Daily weights  Fluid restriction of 2 Liters per day  Limit sodium in diet to around 9821-7785 mg/day  Monitor BP  Activity as tolerated     Patient was instructed to call the Heart Failure Clinic for any changes in symptoms as noted in AVS.      Return in about 1 year (around 3/26/2025). or sooner if needed     Patient given educational materials - see patient instructions.   We discussed the importance of weighing oneself and recording daily. We also discussed the importance of a low sodium diet, higher sodium foods to avoid and better low sodium food options.   Patient verbalizes understanding of plan of care using teach back method, and is agreeable to the treatment plan.       Electronically signed by JIA Doll CNP on 3/26/2024 at 12:09 PM

## 2024-03-26 NOTE — PATIENT INSTRUCTIONS
You may receive a survey regarding the care you received during your visit.  Your input is valuable to us.  We encourage you to complete and return your survey.  We hope you will choose us in the future for your healthcare needs.    Your nurses today were Britney.  Office hours:   Mon-Thurs 8-4:30  Friday 8-12  Phone: [phone]    Continue:  Continue current medications  Daily weights and record  Fluid restriction of 2 Liters per day  Limit sodium in diet to around 7520-1908 mg/day  Monitor BP  Activity as tolerated     Call the Heart Failure Clinic for any of the following symptoms:   Weight gain of 2-3 pounds in 1 day or 5 pounds in 1 week  Increased shortness of breath  Shortness of breath while laying down  Cough  Chest pain  Swelling in feet, ankles or legs  Bloating in abdomen  Fatigue          Call if Blood pressures start trending above 150/90    Get blood work in 1 mth    Restart on Entresto - call if insurance doesn't cover it or its too expensive.

## 2024-04-08 ENCOUNTER — TELEPHONE (OUTPATIENT)
Dept: CARDIOLOGY CLINIC | Age: 64
End: 2024-04-08

## 2024-04-08 DIAGNOSIS — I50.22 CHF (CONGESTIVE HEART FAILURE), NYHA CLASS II, CHRONIC, SYSTOLIC (HCC): ICD-10-CM

## 2024-04-30 LAB
BUN BLDV-MCNC: 11 MG/DL
CALCIUM SERPL-MCNC: 9.2 MG/DL
CHLORIDE BLD-SCNC: 98 MMOL/L
CO2: 28 MMOL/L
CREAT SERPL-MCNC: 1 MG/DL
EGFR: 78
GLUCOSE BLD-MCNC: 95 MG/DL
POTASSIUM SERPL-SCNC: 3.4 MMOL/L
SODIUM BLD-SCNC: 139 MMOL/L

## 2024-05-01 ENCOUNTER — TELEPHONE (OUTPATIENT)
Dept: CARDIOLOGY CLINIC | Age: 64
End: 2024-05-01

## 2024-05-01 RX ORDER — POTASSIUM CHLORIDE 750 MG/1
10 TABLET, FILM COATED, EXTENDED RELEASE ORAL DAILY
Qty: 90 TABLET | Refills: 3 | Status: SHIPPED | OUTPATIENT
Start: 2024-05-01

## 2024-05-01 NOTE — TELEPHONE ENCOUNTER
Labs reviewed - everything looks good except Potassium just little low.   K+ 3.4  = would like add Kcl 10/day - Rx sent to [facility]

## 2024-07-14 ENCOUNTER — APPOINTMENT (OUTPATIENT)
Dept: GENERAL RADIOLOGY | Age: 64
End: 2024-07-14
Payer: MEDICARE

## 2024-07-14 ENCOUNTER — APPOINTMENT (OUTPATIENT)
Dept: CT IMAGING | Age: 64
End: 2024-07-14
Payer: MEDICARE

## 2024-07-14 ENCOUNTER — HOSPITAL ENCOUNTER (INPATIENT)
Age: 64
LOS: 4 days | Discharge: HOME | End: 2024-07-18
Attending: EMERGENCY MEDICINE | Admitting: SURGERY
Payer: MEDICARE

## 2024-07-14 DIAGNOSIS — F10.929 ACUTE ALCOHOLIC INTOXICATION WITH COMPLICATION (HCC): ICD-10-CM

## 2024-07-14 DIAGNOSIS — S06.5XAA SUBDURAL HEMATOMA (HCC): ICD-10-CM

## 2024-07-14 DIAGNOSIS — I62.9 INTRACRANIAL HEMORRHAGE (HCC): ICD-10-CM

## 2024-07-14 DIAGNOSIS — I60.9 SAH (SUBARACHNOID HEMORRHAGE) (HCC): ICD-10-CM

## 2024-07-14 DIAGNOSIS — I60.9 SUBARACHNOID HEMORRHAGE (HCC): ICD-10-CM
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
## 2024-07-14 DIAGNOSIS — S06.30AA: Primary | ICD-10-CM
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
## 2024-07-14 DIAGNOSIS — T14.90XA TRAUMA: ICD-10-CM

## 2024-07-14 DIAGNOSIS — S06.5XAA SDH (SUBDURAL HEMATOMA) (HCC): ICD-10-CM

## 2024-07-14 DIAGNOSIS — S00.03XA SCALP HEMATOMA, INITIAL ENCOUNTER: ICD-10-CM

## 2024-07-14 PROBLEM — Z79.02 ANTIPLATELET OR ANTITHROMBOTIC LONG-TERM USE: Status: ACTIVE | Noted: 2024-07-14

## 2024-07-14 PROBLEM — Z79.01 ANTICOAGULATED: Status: ACTIVE | Noted: 2024-07-14

## 2024-07-14 LAB
% INHIBITION AA: 16.1 %
% INHIBITION ADP: 5.4 %
AA % AGGREGATION: 83.9 %
ADP PERCENT AGGREGATION: 94.6 %
ALBUMIN SERPL BCG-MCNC: 3.3 G/DL (ref 3.5–5.1)
ALP SERPL-CCNC: 68 U/L (ref 38–126)
ALT SERPL W/O P-5'-P-CCNC: 35 U/L (ref 11–66)
AMPHETAMINES UR QL SCN: NEGATIVE
ANION GAP SERPL CALC-SCNC: 14 MEQ/L (ref 8–16)
ANION GAP SERPL CALC-SCNC: 14 MEQ/L (ref 8–16)
APTT PPP: 34.2 SECONDS (ref 22–38)
AST SERPL-CCNC: 62 U/L (ref 5–40)
BARBITURATES UR QL SCN: NEGATIVE
BASOPHILS ABSOLUTE: 0.1 THOU/MM3 (ref 0–0.1)
BASOPHILS ABSOLUTE: 0.2 THOU/MM3 (ref 0–0.1)
BASOPHILS NFR BLD AUTO: 0.8 %
BASOPHILS NFR BLD AUTO: 1.3 %
BENZODIAZ UR QL SCN: POSITIVE
BILIRUB SERPL-MCNC: 0.6 MG/DL (ref 0.3–1.2)
BUN SERPL-MCNC: 6 MG/DL (ref 7–22)
BUN SERPL-MCNC: 6 MG/DL (ref 7–22)
BZE UR QL SCN: NEGATIVE
CA-I BLD ISE-SCNC: 1.03 MMOL/L (ref 1.12–1.32)
CALCIUM SERPL-MCNC: 7.8 MG/DL (ref 8.5–10.5)
CALCIUM SERPL-MCNC: 7.9 MG/DL (ref 8.5–10.5)
CANNABINOIDS UR QL SCN: NEGATIVE
CHLORIDE SERPL-SCNC: 92 MEQ/L (ref 98–111)
CHLORIDE SERPL-SCNC: 93 MEQ/L (ref 98–111)
CO2 SERPL-SCNC: 20 MEQ/L (ref 23–33)
CO2 SERPL-SCNC: 21 MEQ/L (ref 23–33)
CREAT SERPL-MCNC: 0.5 MG/DL (ref 0.4–1.2)
CREAT SERPL-MCNC: 0.6 MG/DL (ref 0.4–1.2)
CREAT UR-MCNC: 33.7 MG/DL
DEPRECATED RDW RBC AUTO: 44.3 FL (ref 35–45)
DEPRECATED RDW RBC AUTO: 45.2 FL (ref 35–45)
EOSINOPHIL NFR BLD AUTO: 0.5 %
EOSINOPHIL NFR BLD AUTO: 1.3 %
EOSINOPHILS ABSOLUTE: 0.1 THOU/MM3 (ref 0–0.4)
EOSINOPHILS ABSOLUTE: 0.2 THOU/MM3 (ref 0–0.4)
ERYTHROCYTE [DISTWIDTH] IN BLOOD BY AUTOMATED COUNT: 12.2 % (ref 11.5–14.5)
ERYTHROCYTE [DISTWIDTH] IN BLOOD BY AUTOMATED COUNT: 12.2 % (ref 11.5–14.5)
ETHANOL SERPL-MCNC: 0.03 % (ref 0–0.08)
ETHANOL SERPL-MCNC: 0.12 % (ref 0–0.08)
FENTANYL: NEGATIVE
FOLATE SERPL-MCNC: 8.3 NG/ML (ref 4.8–24.2)
GFR SERPL CREATININE-BSD FRML MDRD: > 90 ML/MIN/1.73M2
GFR SERPL CREATININE-BSD FRML MDRD: > 90 ML/MIN/1.73M2
GLUCOSE BLD STRIP.AUTO-MCNC: 90 MG/DL (ref 70–108)
GLUCOSE SERPL-MCNC: 82 MG/DL (ref 70–108)
GLUCOSE SERPL-MCNC: 84 MG/DL (ref 70–108)
HCT VFR BLD AUTO: 35.6 % (ref 42–52)
HCT VFR BLD AUTO: 38.4 % (ref 42–52)
HGB BLD-MCNC: 12.7 GM/DL (ref 14–18)
HGB BLD-MCNC: 13.3 GM/DL (ref 14–18)
IMM GRANULOCYTES # BLD AUTO: 0.37 THOU/MM3 (ref 0–0.07)
IMM GRANULOCYTES # BLD AUTO: 0.38 THOU/MM3 (ref 0–0.07)
IMM GRANULOCYTES NFR BLD AUTO: 2.9 %
IMM GRANULOCYTES NFR BLD AUTO: 3.8 %
INR PPP: 1.03 (ref 0.85–1.13)
LYMPHOCYTES ABSOLUTE: 1 THOU/MM3 (ref 1–4.8)
LYMPHOCYTES ABSOLUTE: 1.5 THOU/MM3 (ref 1–4.8)
LYMPHOCYTES NFR BLD AUTO: 11.6 %
LYMPHOCYTES NFR BLD AUTO: 9.8 %
MA AA BLD RES TEG: 57.7 MM (ref 51–71)
MA ACTF BLD RES TEG: 15.9 MM (ref 2–19)
MA ADP BLD RES TEG: 63 MM (ref 45–69)
MA KAOLIN P HPASE BLD RES TEG: 65.7 MM (ref 53–68)
MAGNESIUM SERPL-MCNC: 2.1 MG/DL (ref 1.6–2.4)
MCH RBC QN AUTO: 35 PG (ref 26–33)
MCH RBC QN AUTO: 35.5 PG (ref 26–33)
MCHC RBC AUTO-ENTMCNC: 34.6 GM/DL (ref 32.2–35.5)
MCHC RBC AUTO-ENTMCNC: 35.7 GM/DL (ref 32.2–35.5)
MCV RBC AUTO: 101.1 FL (ref 80–94)
MCV RBC AUTO: 99.4 FL (ref 80–94)
MONOCYTES ABSOLUTE: 1.4 THOU/MM3 (ref 0.4–1.3)
MONOCYTES ABSOLUTE: 1.5 THOU/MM3 (ref 0.4–1.3)
MONOCYTES NFR BLD AUTO: 12.3 %
MONOCYTES NFR BLD AUTO: 13.7 %
MRSA DNA SPEC QL NAA+PROBE: NEGATIVE
NEUTROPHILS ABSOLUTE: 7.2 THOU/MM3 (ref 1.8–7.7)
NEUTROPHILS ABSOLUTE: 8.9 THOU/MM3 (ref 1.8–7.7)
NEUTROPHILS NFR BLD AUTO: 70.6 %
NEUTROPHILS NFR BLD AUTO: 71.4 %
NRBC BLD AUTO-RTO: 0 /100 WBC
NRBC BLD AUTO-RTO: 0 /100 WBC
OPIATES UR QL SCN: NEGATIVE
ORIGINAL SAMPLE NUMBER: NORMAL
ORIGINAL SAMPLE NUMBER: NORMAL
OSMOLALITY SERPL CALC.SUM OF ELEC: 250.1 MOSMOL/KG (ref 275–300)
OSMOLALITY SERPL CALC.SUM OF ELEC: 253.9 MOSMOL/KG (ref 275–300)
OSMOLALITY SERPL: NORMAL MOSMOL/KG (ref 275–295)
OSMOLALITY UR: NORMAL MOSMOL/KG (ref 250–750)
OXYCODONE: NEGATIVE
PCP UR QL SCN: NEGATIVE
PHOSPHATE SERPL-MCNC: 2.9 MG/DL (ref 2.4–4.7)
PLATELET # BLD AUTO: 194 THOU/MM3 (ref 130–400)
PLATELET # BLD AUTO: 215 THOU/MM3 (ref 130–400)
PMV BLD AUTO: 9.1 FL (ref 9.4–12.4)
PMV BLD AUTO: 9.4 FL (ref 9.4–12.4)
POTASSIUM SERPL-SCNC: 3.4 MEQ/L (ref 3.5–5.2)
POTASSIUM SERPL-SCNC: 3.7 MEQ/L (ref 3.5–5.2)
PROT SERPL-MCNC: 6.4 G/DL (ref 6.1–8)
RBC # BLD AUTO: 3.58 MILL/MM3 (ref 4.7–6.1)
RBC # BLD AUTO: 3.8 MILL/MM3 (ref 4.7–6.1)
REFERENCE LOCATION: NORMAL
REFERENCE LOCATION: NORMAL
REFERENCE RANGE: NORMAL
REFERENCE RANGE: NORMAL
SODIUM SERPL-SCNC: 126 MEQ/L (ref 135–145)
SODIUM SERPL-SCNC: 127 MEQ/L (ref 135–145)
SODIUM SERPL-SCNC: 127 MEQ/L (ref 135–145)
SODIUM SERPL-SCNC: 128 MEQ/L (ref 135–145)
SODIUM SERPL-SCNC: 130 MEQ/L (ref 135–145)
SODIUM SERPL-SCNC: 131 MEQ/L (ref 135–145)
SODIUM SERPL-SCNC: 131 MEQ/L (ref 135–145)
SODIUM SERPL-SCNC: 134 MEQ/L (ref 135–145)
SODIUM SERPL-SCNC: 139 MEQ/L (ref 135–145)
SODIUM UR-SCNC: < 20 MEQ/L
TEST RESULTS WITH UNITS: NORMAL
TEST RESULTS WITH UNITS: NORMAL
TEST(S) BEING PERFORMED: NORMAL
TEST(S) BEING PERFORMED: NORMAL
TSH SERPL DL<=0.005 MIU/L-ACNC: 0.87 UIU/ML (ref 0.4–4.2)
VIT B12 SERPL-MCNC: 276 PG/ML (ref 211–911)
WBC # BLD AUTO: 10.1 THOU/MM3 (ref 4.8–10.8)
WBC # BLD AUTO: 12.6 THOU/MM3 (ref 4.8–10.8)

## 2024-07-14 PROCEDURE — 84100 ASSAY OF PHOSPHORUS: CPT

## 2024-07-14 PROCEDURE — 80053 COMPREHEN METABOLIC PANEL: CPT

## 2024-07-14 PROCEDURE — 85730 THROMBOPLASTIN TIME PARTIAL: CPT

## 2024-07-14 PROCEDURE — 85610 PROTHROMBIN TIME: CPT

## 2024-07-14 PROCEDURE — 6820000002 HC L2 INJURY CALL ACTIVATION: Performed by: SURGERY

## 2024-07-14 PROCEDURE — 97129 THER IVNTJ 1ST 15 MIN: CPT

## 2024-07-14 PROCEDURE — 71045 X-RAY EXAM CHEST 1 VIEW: CPT

## 2024-07-14 PROCEDURE — 6370000000 HC RX 637 (ALT 250 FOR IP): Performed by: PHYSICIAN ASSISTANT

## 2024-07-14 PROCEDURE — 2580000003 HC RX 258: Performed by: NEUROLOGICAL SURGERY

## 2024-07-14 PROCEDURE — 72170 X-RAY EXAM OF PELVIS: CPT

## 2024-07-14 PROCEDURE — 6360000002 HC RX W HCPCS: Performed by: PHYSICIAN ASSISTANT

## 2024-07-14 PROCEDURE — 2500000003 HC RX 250 WO HCPCS: Performed by: INTERNAL MEDICINE

## 2024-07-14 PROCEDURE — 2580000003 HC RX 258

## 2024-07-14 PROCEDURE — 87641 MR-STAPH DNA AMP PROBE: CPT

## 2024-07-14 PROCEDURE — 99291 CRITICAL CARE FIRST HOUR: CPT | Performed by: INTERNAL MEDICINE

## 2024-07-14 PROCEDURE — 83735 ASSAY OF MAGNESIUM: CPT

## 2024-07-14 PROCEDURE — 82746 ASSAY OF FOLIC ACID SERUM: CPT

## 2024-07-14 PROCEDURE — 84295 ASSAY OF SERUM SODIUM: CPT

## 2024-07-14 PROCEDURE — 2500000003 HC RX 250 WO HCPCS

## 2024-07-14 PROCEDURE — 84443 ASSAY THYROID STIM HORMONE: CPT

## 2024-07-14 PROCEDURE — 99285 EMERGENCY DEPT VISIT HI MDM: CPT

## 2024-07-14 PROCEDURE — 82077 ASSAY SPEC XCP UR&BREATH IA: CPT

## 2024-07-14 PROCEDURE — 82330 ASSAY OF CALCIUM: CPT

## 2024-07-14 PROCEDURE — 83935 ASSAY OF URINE OSMOLALITY: CPT

## 2024-07-14 PROCEDURE — 36415 COLL VENOUS BLD VENIPUNCTURE: CPT

## 2024-07-14 PROCEDURE — 85025 COMPLETE CBC W/AUTO DIFF WBC: CPT

## 2024-07-14 PROCEDURE — 2000000000 HC ICU R&B

## 2024-07-14 PROCEDURE — 2580000003 HC RX 258: Performed by: PHYSICIAN ASSISTANT

## 2024-07-14 PROCEDURE — 99291 CRITICAL CARE FIRST HOUR: CPT | Performed by: NEUROLOGICAL SURGERY

## 2024-07-14 PROCEDURE — 83930 ASSAY OF BLOOD OSMOLALITY: CPT

## 2024-07-14 PROCEDURE — 92523 SPEECH SOUND LANG COMPREHEN: CPT

## 2024-07-14 PROCEDURE — 84300 ASSAY OF URINE SODIUM: CPT

## 2024-07-14 PROCEDURE — 82948 REAGENT STRIP/BLOOD GLUCOSE: CPT

## 2024-07-14 PROCEDURE — 6360000002 HC RX W HCPCS

## 2024-07-14 PROCEDURE — 85576 BLOOD PLATELET AGGREGATION: CPT

## 2024-07-14 PROCEDURE — 70450 CT HEAD/BRAIN W/O DYE: CPT

## 2024-07-14 PROCEDURE — 80307 DRUG TEST PRSMV CHEM ANLYZR: CPT

## 2024-07-14 PROCEDURE — 99223 1ST HOSP IP/OBS HIGH 75: CPT | Performed by: SURGERY

## 2024-07-14 PROCEDURE — 82570 ASSAY OF URINE CREATININE: CPT

## 2024-07-14 PROCEDURE — 6370000000 HC RX 637 (ALT 250 FOR IP)

## 2024-07-14 PROCEDURE — 82607 VITAMIN B-12: CPT

## 2024-07-14 RX ORDER — DIAZEPAM 5 MG/1
2.5 TABLET ORAL 2 TIMES DAILY PRN
Status: DISCONTINUED | OUTPATIENT
Start: 2024-07-14 | End: 2024-07-18 | Stop reason: HOSPADM

## 2024-07-14 RX ORDER — METOPROLOL SUCCINATE 100 MG/1
100 TABLET, EXTENDED RELEASE ORAL DAILY
Status: DISCONTINUED | OUTPATIENT
Start: 2024-07-14 | End: 2024-07-18 | Stop reason: HOSPADM

## 2024-07-14 RX ORDER — BUMETANIDE 1 MG/1
1 TABLET ORAL DAILY
Status: DISCONTINUED | OUTPATIENT
Start: 2024-07-14 | End: 2024-07-18 | Stop reason: HOSPADM

## 2024-07-14 RX ORDER — POTASSIUM CHLORIDE 29.8 MG/ML
20 INJECTION INTRAVENOUS PRN
Status: DISCONTINUED | OUTPATIENT
Start: 2024-07-14 | End: 2024-07-18 | Stop reason: HOSPADM

## 2024-07-14 RX ORDER — FAMOTIDINE 20 MG/1
20 TABLET, FILM COATED ORAL 2 TIMES DAILY
Status: DISCONTINUED | OUTPATIENT
Start: 2024-07-14 | End: 2024-07-18 | Stop reason: HOSPADM

## 2024-07-14 RX ORDER — MAGNESIUM SULFATE IN WATER 40 MG/ML
2000 INJECTION, SOLUTION INTRAVENOUS PRN
Status: DISCONTINUED | OUTPATIENT
Start: 2024-07-14 | End: 2024-07-18 | Stop reason: HOSPADM

## 2024-07-14 RX ORDER — NICOTINE 21 MG/24HR
1 PATCH, TRANSDERMAL 24 HOURS TRANSDERMAL DAILY PRN
Status: DISCONTINUED | OUTPATIENT
Start: 2024-07-14 | End: 2024-07-18 | Stop reason: HOSPADM

## 2024-07-14 RX ORDER — GINSENG 100 MG
CAPSULE ORAL 3 TIMES DAILY
Status: DISCONTINUED | OUTPATIENT
Start: 2024-07-14 | End: 2024-07-18 | Stop reason: HOSPADM

## 2024-07-14 RX ORDER — LORAZEPAM 2 MG/ML
3 INJECTION INTRAMUSCULAR
Status: DISCONTINUED | OUTPATIENT
Start: 2024-07-14 | End: 2024-07-14

## 2024-07-14 RX ORDER — POLYETHYLENE GLYCOL 3350 17 G/17G
17 POWDER, FOR SOLUTION ORAL DAILY
Status: DISCONTINUED | OUTPATIENT
Start: 2024-07-14 | End: 2024-07-18 | Stop reason: HOSPADM

## 2024-07-14 RX ORDER — SODIUM CHLORIDE 0.9 % (FLUSH) 0.9 %
5-40 SYRINGE (ML) INJECTION EVERY 12 HOURS SCHEDULED
Status: DISCONTINUED | OUTPATIENT
Start: 2024-07-14 | End: 2024-07-18 | Stop reason: HOSPADM

## 2024-07-14 RX ORDER — SODIUM CHLORIDE 9 MG/ML
INJECTION, SOLUTION INTRAVENOUS PRN
Status: DISCONTINUED | OUTPATIENT
Start: 2024-07-14 | End: 2024-07-18 | Stop reason: HOSPADM

## 2024-07-14 RX ORDER — POTASSIUM CHLORIDE 7.45 MG/ML
10 INJECTION INTRAVENOUS PRN
Status: DISCONTINUED | OUTPATIENT
Start: 2024-07-14 | End: 2024-07-18 | Stop reason: HOSPADM

## 2024-07-14 RX ORDER — SODIUM CHLORIDE 0.9 % (FLUSH) 0.9 %
5-40 SYRINGE (ML) INJECTION PRN
Status: DISCONTINUED | OUTPATIENT
Start: 2024-07-14 | End: 2024-07-18 | Stop reason: HOSPADM

## 2024-07-14 RX ORDER — LANOLIN ALCOHOL/MO/W.PET/CERES
100 CREAM (GRAM) TOPICAL DAILY
Status: DISCONTINUED | OUTPATIENT
Start: 2024-07-14 | End: 2024-07-14

## 2024-07-14 RX ORDER — FOLIC ACID 1 MG/1
1 TABLET ORAL DAILY
Status: DISCONTINUED | OUTPATIENT
Start: 2024-07-14 | End: 2024-07-18 | Stop reason: HOSPADM

## 2024-07-14 RX ORDER — TRANEXAMIC ACID 10 MG/ML
1000 INJECTION, SOLUTION INTRAVENOUS ONCE
Status: COMPLETED | OUTPATIENT
Start: 2024-07-14 | End: 2024-07-14

## 2024-07-14 RX ORDER — 3% SODIUM CHLORIDE 3 G/100ML
25 INJECTION, SOLUTION INTRAVENOUS CONTINUOUS
Status: DISPENSED | OUTPATIENT
Start: 2024-07-14 | End: 2024-07-15

## 2024-07-14 RX ORDER — LANOLIN ALCOHOL/MO/W.PET/CERES
100 CREAM (GRAM) TOPICAL DAILY
Status: DISCONTINUED | OUTPATIENT
Start: 2024-07-14 | End: 2024-07-18 | Stop reason: HOSPADM

## 2024-07-14 RX ORDER — MORPHINE SULFATE 4 MG/ML
4 INJECTION, SOLUTION INTRAMUSCULAR; INTRAVENOUS
Status: DISCONTINUED | OUTPATIENT
Start: 2024-07-14 | End: 2024-07-18 | Stop reason: HOSPADM

## 2024-07-14 RX ORDER — AMIODARONE HYDROCHLORIDE 200 MG/1
200 TABLET ORAL DAILY
Status: DISCONTINUED | OUTPATIENT
Start: 2024-07-14 | End: 2024-07-18 | Stop reason: HOSPADM

## 2024-07-14 RX ORDER — PHENOBARBITAL SODIUM 65 MG/ML
260 INJECTION, SOLUTION INTRAMUSCULAR; INTRAVENOUS
Status: DISCONTINUED | OUTPATIENT
Start: 2024-07-14 | End: 2024-07-18 | Stop reason: HOSPADM

## 2024-07-14 RX ORDER — LORAZEPAM 0.5 MG/1
1 TABLET ORAL
Status: DISCONTINUED | OUTPATIENT
Start: 2024-07-14 | End: 2024-07-14

## 2024-07-14 RX ORDER — SODIUM CHLORIDE 9 MG/ML
INJECTION, SOLUTION INTRAVENOUS CONTINUOUS
Status: DISCONTINUED | OUTPATIENT
Start: 2024-07-14 | End: 2024-07-14

## 2024-07-14 RX ORDER — LORAZEPAM 2 MG/1
4 TABLET ORAL
Status: DISCONTINUED | OUTPATIENT
Start: 2024-07-14 | End: 2024-07-14

## 2024-07-14 RX ORDER — POLYETHYLENE GLYCOL 3350 17 G
2 POWDER IN PACKET (EA) ORAL
Status: DISCONTINUED | OUTPATIENT
Start: 2024-07-14 | End: 2024-07-18 | Stop reason: HOSPADM

## 2024-07-14 RX ORDER — ACETAMINOPHEN 325 MG/1
650 TABLET ORAL EVERY 6 HOURS PRN
Status: DISCONTINUED | OUTPATIENT
Start: 2024-07-14 | End: 2024-07-18 | Stop reason: HOSPADM

## 2024-07-14 RX ORDER — LORAZEPAM 2 MG/ML
1 INJECTION INTRAMUSCULAR
Status: DISCONTINUED | OUTPATIENT
Start: 2024-07-14 | End: 2024-07-14

## 2024-07-14 RX ORDER — LORAZEPAM 0.5 MG/1
2 TABLET ORAL
Status: DISCONTINUED | OUTPATIENT
Start: 2024-07-14 | End: 2024-07-14

## 2024-07-14 RX ORDER — POTASSIUM CHLORIDE 750 MG/1
10 TABLET, FILM COATED, EXTENDED RELEASE ORAL DAILY
Status: DISCONTINUED | OUTPATIENT
Start: 2024-07-14 | End: 2024-07-18 | Stop reason: HOSPADM

## 2024-07-14 RX ORDER — TRAMADOL HYDROCHLORIDE 50 MG/1
50 TABLET ORAL EVERY 6 HOURS PRN
Status: DISCONTINUED | OUTPATIENT
Start: 2024-07-14 | End: 2024-07-18 | Stop reason: HOSPADM

## 2024-07-14 RX ORDER — LORAZEPAM 2 MG/ML
4 INJECTION INTRAMUSCULAR
Status: DISCONTINUED | OUTPATIENT
Start: 2024-07-14 | End: 2024-07-14

## 2024-07-14 RX ORDER — PHENOBARBITAL SODIUM 65 MG/ML
130 INJECTION, SOLUTION INTRAMUSCULAR; INTRAVENOUS
Status: DISCONTINUED | OUTPATIENT
Start: 2024-07-14 | End: 2024-07-18 | Stop reason: HOSPADM

## 2024-07-14 RX ORDER — ACETAMINOPHEN 325 MG/1
650 TABLET ORAL EVERY 4 HOURS PRN
Status: DISCONTINUED | OUTPATIENT
Start: 2024-07-14 | End: 2024-07-18 | Stop reason: HOSPADM

## 2024-07-14 RX ORDER — SODIUM CHLORIDE, SODIUM LACTATE, POTASSIUM CHLORIDE, CALCIUM CHLORIDE 600; 310; 30; 20 MG/100ML; MG/100ML; MG/100ML; MG/100ML
INJECTION, SOLUTION INTRAVENOUS CONTINUOUS
Status: DISCONTINUED | OUTPATIENT
Start: 2024-07-14 | End: 2024-07-14

## 2024-07-14 RX ORDER — ONDANSETRON 4 MG/1
4 TABLET, ORALLY DISINTEGRATING ORAL EVERY 8 HOURS PRN
Status: DISCONTINUED | OUTPATIENT
Start: 2024-07-14 | End: 2024-07-18 | Stop reason: HOSPADM

## 2024-07-14 RX ORDER — MORPHINE SULFATE 2 MG/ML
2 INJECTION, SOLUTION INTRAMUSCULAR; INTRAVENOUS
Status: DISCONTINUED | OUTPATIENT
Start: 2024-07-14 | End: 2024-07-18 | Stop reason: HOSPADM

## 2024-07-14 RX ORDER — ROSUVASTATIN CALCIUM 20 MG/1
40 TABLET, COATED ORAL NIGHTLY
Status: DISCONTINUED | OUTPATIENT
Start: 2024-07-14 | End: 2024-07-18 | Stop reason: HOSPADM

## 2024-07-14 RX ORDER — LORAZEPAM 2 MG/ML
2 INJECTION INTRAMUSCULAR
Status: DISCONTINUED | OUTPATIENT
Start: 2024-07-14 | End: 2024-07-14

## 2024-07-14 RX ORDER — MULTIVITAMIN WITH IRON
1 TABLET ORAL DAILY
Status: DISCONTINUED | OUTPATIENT
Start: 2024-07-14 | End: 2024-07-18 | Stop reason: HOSPADM

## 2024-07-14 RX ORDER — ONDANSETRON 2 MG/ML
4 INJECTION INTRAMUSCULAR; INTRAVENOUS EVERY 6 HOURS PRN
Status: DISCONTINUED | OUTPATIENT
Start: 2024-07-14 | End: 2024-07-18 | Stop reason: HOSPADM

## 2024-07-14 RX ADMIN — FOLIC ACID 1 MG: 1 TABLET ORAL at 09:51

## 2024-07-14 RX ADMIN — BACITRACIN: 500 OINTMENT TOPICAL at 15:17

## 2024-07-14 RX ADMIN — SODIUM CHLORIDE: 9 INJECTION, SOLUTION INTRAVENOUS at 13:37

## 2024-07-14 RX ADMIN — SODIUM CHLORIDE 25 ML/HR: 3 INJECTION, SOLUTION INTRAVENOUS at 15:13

## 2024-07-14 RX ADMIN — POTASSIUM CHLORIDE 10 MEQ: 7.46 INJECTION, SOLUTION INTRAVENOUS at 05:35

## 2024-07-14 RX ADMIN — POTASSIUM CHLORIDE 10 MEQ: 750 TABLET, FILM COATED, EXTENDED RELEASE ORAL at 09:51

## 2024-07-14 RX ADMIN — POTASSIUM CHLORIDE 10 MEQ: 7.46 INJECTION, SOLUTION INTRAVENOUS at 04:45

## 2024-07-14 RX ADMIN — ACETAMINOPHEN 650 MG: 325 TABLET ORAL at 09:59

## 2024-07-14 RX ADMIN — BACITRACIN: 500 OINTMENT TOPICAL at 09:51

## 2024-07-14 RX ADMIN — SODIUM CHLORIDE: 9 INJECTION, SOLUTION INTRAVENOUS at 04:56

## 2024-07-14 RX ADMIN — SODIUM BICARBONATE 50 MEQ: 84 INJECTION, SOLUTION INTRAVENOUS at 12:43

## 2024-07-14 RX ADMIN — NICOTINE POLACRILEX 2 MG: 2 LOZENGE ORAL at 09:51

## 2024-07-14 RX ADMIN — LEVETIRACETAM 1000 MG: 100 INJECTION INTRAVENOUS at 05:24

## 2024-07-14 RX ADMIN — LEVETIRACETAM 500 MG: 100 INJECTION INTRAVENOUS at 18:37

## 2024-07-14 RX ADMIN — TRANEXAMIC ACID 1000 MG: 10 INJECTION, SOLUTION INTRAVENOUS at 05:52

## 2024-07-14 RX ADMIN — ROSUVASTATIN CALCIUM 40 MG: 20 TABLET, FILM COATED ORAL at 21:24

## 2024-07-14 RX ADMIN — Medication 100 MG: at 09:51

## 2024-07-14 RX ADMIN — FAMOTIDINE 20 MG: 20 TABLET, FILM COATED ORAL at 21:24

## 2024-07-14 RX ADMIN — BUMETANIDE 1 MG: 1 TABLET ORAL at 09:51

## 2024-07-14 RX ADMIN — Medication 1 TABLET: at 09:51

## 2024-07-14 RX ADMIN — TRANEXAMIC ACID 1000 MG: 10 INJECTION, SOLUTION INTRAVENOUS at 06:05

## 2024-07-14 RX ADMIN — AMIODARONE HYDROCHLORIDE 200 MG: 200 TABLET ORAL at 09:51

## 2024-07-14 RX ADMIN — MORPHINE SULFATE 2 MG: 2 INJECTION, SOLUTION INTRAMUSCULAR; INTRAVENOUS at 04:36

## 2024-07-14 RX ADMIN — POTASSIUM CHLORIDE 10 MEQ: 7.46 INJECTION, SOLUTION INTRAVENOUS at 06:48

## 2024-07-14 RX ADMIN — FAMOTIDINE 20 MG: 20 TABLET, FILM COATED ORAL at 09:51

## 2024-07-14 ASSESSMENT — PAIN - FUNCTIONAL ASSESSMENT
PAIN_FUNCTIONAL_ASSESSMENT: ACTIVITIES ARE NOT PREVENTED
PAIN_FUNCTIONAL_ASSESSMENT: ACTIVITIES ARE NOT PREVENTED

## 2024-07-14 ASSESSMENT — PATIENT HEALTH QUESTIONNAIRE - PHQ9
SUM OF ALL RESPONSES TO PHQ9 QUESTIONS 1 & 2: 1
SUM OF ALL RESPONSES TO PHQ QUESTIONS 1-9: 1
2. FEELING DOWN, DEPRESSED OR HOPELESS: SEVERAL DAYS
SUM OF ALL RESPONSES TO PHQ QUESTIONS 1-9: 1
1. LITTLE INTEREST OR PLEASURE IN DOING THINGS: NOT AT ALL

## 2024-07-14 ASSESSMENT — PAIN DESCRIPTION - DESCRIPTORS
DESCRIPTORS: DISCOMFORT;DULL
DESCRIPTORS: ACHING

## 2024-07-14 ASSESSMENT — LIFESTYLE VARIABLES
HOW OFTEN DO YOU HAVE A DRINK CONTAINING ALCOHOL: 2-3 TIMES A WEEK
HOW MANY STANDARD DRINKS CONTAINING ALCOHOL DO YOU HAVE ON A TYPICAL DAY: 5 OR 6

## 2024-07-14 ASSESSMENT — PAIN SCALES - GENERAL
PAINLEVEL_OUTOF10: 6
PAINLEVEL_OUTOF10: 2
PAINLEVEL_OUTOF10: 1
PAINLEVEL_OUTOF10: 4
PAINLEVEL_OUTOF10: 4

## 2024-07-14 ASSESSMENT — PAIN DESCRIPTION - ORIENTATION: ORIENTATION: POSTERIOR;RIGHT

## 2024-07-14 ASSESSMENT — PAIN DESCRIPTION - LOCATION
LOCATION: HEAD
LOCATION: RIB CAGE;HEAD

## 2024-07-14 NOTE — CONSULTS
CRITICAL CARE H&P NOTE      Patient:  Malik Ramirez    Unit/Bed:03/003A  YOB: 1960  MRN: [id number]   PCP: Carlos Arellano DO  Date of Admission: 7/14/2024  Chief Complaint:- Fall    Assessment and Plan:    Subarachnoid and Subdural hemorrhages: Both along the falx as well as along the left tentorial leaflet.  CT scan here shows increased left temporal parenchymal hemorrhage with associated edema, mass effect and local sulcal effacement.  Initial CT scan also small region over the right parietal lobe.  On both Eliquis and Plavix.  Given 2 g Kcentra, 10 mg vitamin K at [place]. Will check TEG platelet mapping to evaluate the need for DDAVP. Monitor mentation and airway protection. Current GCS 15. Start Keppra for seizure prophylaxis. Ordered TXA as this wasn't given in ED. Goal -160.  Trauma primary. Neurosurgery consult. Appreciate recs.   Moderate hyponatremia: asymptomatic. sodium on arrival 126. Given hx likely beer potomania. Check urine sodium and osm. Add on serum osm. Fluid restriction. May consider hypertonic saline in the setting of brain bleed as above.   permanent A-fib: [id number] 6. s/p DCCV 2021, 2022. Continue amiodarone 200 mg daily. Hold eliquis for bleed as above.   HFimpEF: ICM and tachycardia induced.  Echo in 2021 with EF of 25%.  Improved to 45% and 2022.  No recent echos.  Follows with OP HF clinic.  on entresto, toprol, jiardiance.  Would hold all oral antihypertensives and start Cardene gtt. if necessary for tighter BP control. Check echo.   CAD: S/p PCI to LAD in 2021. Hold plavix in setting of bleed as above. Continue crestor.   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  Small old right frontal lobe infarct.  Noted.  Continue statin.  Hold antiplatelets as above.  Alcohol use disorder:  GMAWS scale. Phenobarbitol PRN.  Alcohol level on arrival here 0.12.  Thiamine, folic acid, multivitamin daily.   Active smoker: Nicotine patch.

## 2024-07-14 NOTE — H&P
Yes    Inpatient consult to Critical Care     Standing Status:   Standing     Number of Occurrences:   1     Order Specific Question:   Reason for Consult?     Answer:   trauma     Order Specific Question:   Provider Contacted?     Answer:   Yes    Inpatient consult to Social Work     Standing Status:   Standing     Number of Occurrences:   1     Order Specific Question:   Reason for Consult?     Answer:   For consideration of rehab    Inpatient Consult to Addiction Services     Standing Status:   Standing     Number of Occurrences:   1     Order Specific Question:   Reason for Consult?     Answer:   For consideration of rehab    OT eval and treat     Standing Status:   Standing     Number of Occurrences:   1    PT evaluation and treat     Standing Status:   Standing     Number of Occurrences:   1    Initiate Oxygen Therapy Protocol     Initiate oxygen therapy if the patient has 1) SpO2 is less than 90%, 2) Cyanosis, Chest Pain, Dyspnea, or Altered level of consciousness AND SpO2 checked and it is less than 90% or unobtainable, or 3) patient on Home oxygen.    To initiate oxygen therapy: nurse or RT enters Nasal cannula oxygen order using Per Protocol without Cosign order mode (if indication Home Oxygen then change L/min to same amount at home and change Wean to Room Air to No).    Notify provider if initiate oxygen therapy unless already on Home Oxygen.     Standing Status:   Standing     Number of Occurrences:   32511    Initiate Oxygen Therapy Protocol     Initiate oxygen therapy if the patient has 1) SpO2 is less than 90%, 2) Cyanosis, Chest Pain, Dyspnea, or Altered level of consciousness AND SpO2 checked and it is less than 90% or unobtainable, or 3) patient on Home oxygen.    To initiate oxygen therapy: nurse or RT enters Nasal cannula oxygen order using Per Protocol without Cosign order mode (if indication Home Oxygen then change L/min to same amount at home and change Wean to Room Air to No).    Notify  Additional Notes: will consider removal if keeps flaring, pt advised to call office directly if sx desired.  dd day if rest and limited lifting x 10 days   BUN 6* 6*   CREATININE 0.6 0.5   MG  --  2.1   PHOS  --  2.9   CALCIUM 7.9* 7.8*   INR 1.03  --    AST 62*  --    ALT 35  --    BILITOT 0.6  --      Recent Labs     07/14/24  0200   INR 1.03     No results for input(s): \"TROPONINT\" in the last 72 hours.  CT HEAD WO CONTRAST    Result Date: 7/14/2024  Increased left temporal parenchymal hemorrhage with associated edema, mass effect, and local sulcal effacement. Slightly decreased right parietal extra-axial hemorrhage. Nonemergent/incidental findings in the report. This document has been electronically signed by: Blayne Downs MD on 07/14/2024 04:13 AM All CTs at this facility use dose modulation techniques and iterative reconstructions, and/or weight-based dosing when appropriate to reduce radiation to a low as reasonably achievable.    XR PELVIS (1-2 VIEWS)    Result Date: 7/14/2024  No acute osseous abnormality. This document has been electronically signed by: Gray Clarke MD on 07/14/2024 03:12 AM    XR CHEST PORTABLE    Result Date: 7/14/2024  No acute disease. This document has been electronically signed by: Gray Clarke MD on 07/14/2024 03:06 AM    CT INTERPRETATION OF OUTSIDE IMAGES    Result Date: 7/14/2024  No fracture or subluxation. Straightening of the cervical lordosis which may be secondary to muscle spasm or positioning. Multilevel degenerative changes as described above. This document has been electronically signed by: Gray Clarke MD on 07/14/2024 03:05 AM All CTs at this facility use dose modulation techniques and iterative reconstructions, and/or weight-based dosing when appropriate to reduce radiation to a low as reasonably achievable.    CT INTERPRETATION OF OUTSIDE IMAGES    Result Date: 7/14/2024  8 x 4 mm small acute hematoma in the lateral periphery of the right parietal lobe, probably extra-axial. Small acute subdural and subarachnoid hemorrhage in the anterior inferior left temporal region. Old infarct of the left parietal-occipital-temporal  made in my capacity as an M.D. when I treated and diagnosed Malik Ramirez on the date of service above     I was responsible for all medical decision making involving this encounter.      I identified and/or confirmed all problems associated with this patient encounter by my own direct physical examination of this patient and review of all radiology studies and labwork  that were ordered and available.    Active Hospital Problems    Diagnosis     Permanent atrial fibrillation (HCC) [I48.21]      Priority: High    SDH (subdural hematoma) (HCC) [S06.5XAA]     SAH (subarachnoid hemorrhage) (HCC) [I60.9]     Falls, initial encounter [W19.XXXA]     Scalp hematoma, initial encounter [S00.03XA]     Anticoagulated [Z79.01]     Antiplatelet or antithrombotic long-term use [Z79.02]     Trauma [T14.90XA]     Traumatic intraparenchymal hemorrhage (HCC) [S06.30AA]     Chronic combined systolic and diastolic CHF (congestive heart failure) (HCC) [I50.42]     Ischemic cardiomyopathy [I25.5]         I  discussed the management of all of the identified problems with the APN or PA.      I formulated the treatment plan for all identified problems and discussed those with the APN or PA .      This management plan was then carried out and the patient's orders for care by the APN or PA.      Total time personally spent on this patient encounter was 78 minutes which includes :  Time does not include procedures.    Please see our orders that were directed and approved by me if there are any new ones for the updated patient care plan.    Above discussed and I agree with documentation and orders placed by Anita PATTERSON    See any additional comments if needed below for any other updated orders and plans.

## 2024-07-14 NOTE — CONSULTS
[place], [place]                                          NEUROSURGICAL CONSULTATION NOTE       Malik Ramirez   YOB: 1960  Account Number: [id number]   Date of Examination: 7/14/2024    ASSESSMENT:    -This is a 63-year-old male s/p ground-level fall who underwent brain CT that showed:  Small acute acute hematoma in the lateral periphery of the right  parietal lobe and a small acute subarachnoid hemorrhage in the anterior left temporal region.  -GCS: 14/15  -Focal neurological deficit: Confused otherwise there is no focal neurodeficit at this time  -Patient is on Plavix.  - The repeated brain CT showed a  progression in patient.  -Sodium: 127     PLAN:    -Medical management per ICU team and patient primary.  -Keep systolic blood pressure less than 160 and above 100.  -A dose of TXA to be given to the patient.  -Coagulation profile platelet mapping.  -I will withdrawal precaution.  -Hypertonic 3% saline with target of sodium of 145-1 50  -Stop any anticoagulation medication at this time.  -Seizure precaution.  -New brain CT tomorrow morning.  -Neurosurgery will follow.  - The case was discussed in detail with the ICU team, trauma team and with the patient and his nurse.    HISTORY OF PRESENT ILLNESS:  Malik Ramirez is a 63 y.o. male, admitted on :7/14/2024  1:53 AM  This is a 63-year-old male who presented to the ER via squad after he had a fall off steps and hit the posterior part of the head.  There is no history of loss of consciousness. There is no history of new focal deficit.  Patient is on the Plavix.  Patient underwent a brain CT that showed.  Small acute acute hematoma in the lateral periphery of the right  parietal lobe and a small acute subarachnoid hemorrhage in the anterior left temporal region.  The repeated brain CT showed progression in patient.  Patient is on a Plavix    ROS:    Review of Systems   Constitutional:  Negative for fever.   HENT:  Negative for

## 2024-07-14 NOTE — H&P
Trauma History and Physical   Dr Wilkins      Patient:  Malik Ramirez  Admit date: 7/14/2024   YOB: 1960 Date of Evaluation: 7/14/2024  MRN: [id number]  Acct: [id number]    Injury Date:7/14/2024  Injury time:PTA  PCP: Carlos Arellano DO   Referring physician: Dr Nguyen    Time of Trauma Surgeon Notification:  0142  Time of Trauma LANA Arrival: 0210  Time of Trauma Surgeon Arrival:    Services Requested Within 30 Minutes:    Time Contacted:    Assessment:    Trauma by fall   Small acute hematoma right parietal lobe 8x4 mm  Small acute subdural and subarachnoid hemorrhage anterior/inferior left temporal region   Soft tissue swelling/mod scalp hematoma w/gas lucencies overlying Rt lateral calvarium  History of A-fib, CAD, CHF, ETOH abuse   Plan:    Admit under Trauma    - ICU   - NPO   - fall risk    - bedrest     Subdural hematoma/subarachnoid hematoma   -Consult neurosurgery    -Neuro checks per unit routine   -Maintain systolic blood pressure between 100-160   -Trauma dose TXA given    -Elevate head of bed approximately 30 degrees   -Hold all anticoagulant and antiplatelet medications   -Repeat head CT in AM   -Seizure precautions    History of A-fib, CAD, CHF   -Consult intensivist   -Continue meds, hold aspirin, Plavix and Eliquis   - Alcohol withdraw protocol ordered    Pain meds as needed  IVF  NPO  Prophylaxis: SCDs, Pepcid, stool softener, IS, C&DB  Discharge disposition pending clinical course       Activation: []Level I (Trauma Alert) [x]Level II (Injury Call) []Level III (Trauma Consult) []Downgraded  Mode of Arrival: Lifeflight  Referring Facility:   Loss of Consciousness []No [x]Yes[]Unknown  Duration(min)  Mechanism of Injury:  []Motor Vehicle crash   []Single Vehicle [] []Passenger []Scene Fatality []Front Seat  []Restrained   []Air Bag Deployed   []Ejected []Rollover []Pedestrian []Trapped   Type of vehicle:   Protective Devices:   []Motorcycle  Wearing Helmet []Yes  techniques and iterative reconstructions, and/or weight-based dosing when appropriate to reduce radiation to a low as reasonably achievable.    CT INTERPRETATION OF OUTSIDE IMAGES    Result Date: 7/14/2024  8 x 4 mm small acute hematoma in the lateral periphery of the right parietal lobe, probably extra-axial. Small acute subdural and subarachnoid hemorrhage in the anterior inferior left temporal region. 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 Small old infarct of the right frontal lobe. Atrophy and chronic microvascular ischemia. Soft tissue swelling and moderate-sized scalp hematoma with gas lucencies overlying the right lateral calvarium. No fracture is seen. This document has been electronically signed by: Gray Clarke MD on 07/14/2024 02:59 AM All CTs at this facility use dose modulation techniques and iterative reconstructions, and/or weight-based dosing when appropriate to reduce radiation to a low as reasonably achievable.    CT SPINE CERVICAL WITHOUT CONTRAST    Result Date: 7/14/2024  IMPRESSION: 1. Small degree of subarachnoid hemorrhage seen along the left temporal lobe inferiorly, with some possible hemorrhagic cortical contusion or subdural hemorrhage seen along the floor of the middle cranial fossa along the inferior aspect of the left temporal lobe. 2. Small small degree of suspected subdural hemorrhage noted along the falx as well as asymmetrically along the left tentorial leaflet.  This measures up to a couple mm in greatest thickness. 3. Small focal ovoid like density seen in the extra-axial region overlying the right parietal lobe, measuring 5.7 x 3.4 mm. This could represent a small focal subdural hematoma given trauma history, though a small extra-axial meningioma is another consideration. No prior comparison available for review. 4. Soft tissue hematoma overlying the right parietal scalp, with soft tissue gas noted as well. No acute calvarial fracture is identified. 5.  the right upper lobe.  Follow-up CT optional in 12 months, reference below. 8. Findings regarding intracranial hemorrhage were discussed with Dr. Treviño at 12:09 a.m. on 07/14/2024. RECOMMENDATIONS: Right solid pulmonary nodule within the upper lobe measuring 5 mm. Per Fleischner Society Guidelines, a non-contrast Chest CT at 12 months is optional. If performed and the nodule is stable at 12 months, no further follow-up is recommended. These guidelines do not apply to immunocompromised patients and patients with cancer. Follow up in patients with significant comorbidities as clinically warranted. For lung cancer screening, adhere to Lung-RADS guidelines. Reference: Radiology. 2017; 284(1):228-43.    CT HEAD WITHOUT CONTRAST    Result Date: 7/14/2024  IMPRESSION: 1. Small degree of subarachnoid hemorrhage seen along the left temporal lobe inferiorly, with some possible hemorrhagic cortical contusion or subdural hemorrhage seen along the floor of the middle cranial fossa along the inferior aspect of the left temporal lobe. 2. Small small degree of suspected subdural hemorrhage noted along the falx as well as asymmetrically along the left tentorial leaflet.  This measures up to a couple mm in greatest thickness. 3. Small focal ovoid like density seen in the extra-axial region overlying the right parietal lobe, measuring 5.7 x 3.4 mm. This could represent a small focal subdural hematoma given trauma history, though a small extra-axial meningioma is another consideration. No prior comparison available for review. 4. Soft tissue hematoma overlying the right parietal scalp, with soft tissue gas noted as well. No acute calvarial fracture is identified. 5. Encephalomalacia involving the right parietal lobe which is likely related to a prior infarct. 6. No acute osseous abnormality of the cervical spine. 7. 5 mm pulmonary nodule in the right upper lobe.  Follow-up CT optional in 12 months, reference below. 8. Findings

## 2024-07-14 NOTE — PROGRESS NOTES
Addiction consult and Trauma SBIRT attempted. Pt was sleeping and not able to be aroused when calling out his name or knocking on the door.

## 2024-07-14 NOTE — PROGRESS NOTES
CRITICAL CARE PROGRESS NOTE      Patient:  Malik Ramirez    Unit/Bed:4D-18/018-A  YOB: 1960  MRN: [id number]   PCP: Carlos Arellano DO  Date of Admission: 7/14/2024  Chief Complaint:-Fall    Assessment and Plan:    SDH, SAH s/p fall w/ + HS, LOC:    Patient was on anti-coags, antiplatelets at time. Given TXA X2 1g in ED.  Anticoagulation/antiplatelets stopped.  Neurosurgery consulted.  Suggested medical management after no FND, and repeat brain CT showed progression in pt  Sodium 127, will trend.  On 3% hypertonic till 7/15.  Goal at time of admit to increase Na by 6-8 w/in 24h.    Maintain SBP < 160.  Seizure precaution.  Keppra 1 G  Regular neurochecks.  Urinating, passing bowel per baseline.  Repeat head CT 7/15.  ICH associated with chronic anticoagulation:  Chronically on Plavix, Eliquis for CAD and A-fib.  Hold all blood thinners.  TEG mapping per ICU showed % inbition ADP, AA WNL.  2000 units Kcentra, 10 mg vitamin K received.  Follow Neurosurgery recommendations for further anticoagulation.  Hyponatremia: Suspected 2/2 chronic alcohol use  Na 126 on arrival  3% saline started to increase Na   Trend daily labs, neuro checks q4h.  Right scalp hematoma:  No obvious laceration.  Antibiotic ointment, dry dressings as needed.  Pain control, with ice.  Monitor labs, H/H.  Closed head injury:    Consult SLP for cognition eval and treat.  Elevated brain stimulation guidelines  Limited options  Monitor for postconcussive symptoms  Acute alcohol intoxication chronic alcohol abuse:    EtOH on arrival at [facility] 0.2.  7/14 EtOH 0.03.  Trend labs, CIWA protocol, Ativan and phenobarbital as needed.  Multivitamin and thiamine  Consult addiction services, counseling when appropriate.  Leukocytosis: On arrival, 12.6 WBC  7/14 WBC 10.1, afebrile.  Repeat labs in AM.  Recurrent falls:    Alcohol likely contributing factor.  PT/OT eval.  A-fib: hold Ac  CAD: hold Plavix   CVA:   hold AC   Chronic nicotine use:    Nicotine gum.   Counseling for cessation when appropriate    INITIAL H AND P AND ICU COURSE:  63 M with PMHx A-fib on Eliquis, CAD on Plavix, alcohol abuse, CHF transferred from [facility] after sustaining a witnessed fall at home, positive head strike and loss of consciousness.  Patient notes he was drinking alcohol at the time.  Multiple areas of ecchymosis across bilateral arms.  CT at [place] showed subdural, subarachnoid hemorrhages.  Repeat CT at [facility] showed increased left temporal parenchymal hemorrhage with associated edema, mass effect, and local sulcal effacement, without midline shift.  Neurosurgery was consulted.  Patient complained of headache.  Patient denied chest pain, shortness of breath, nausea, vomiting, diarrhea at the time.  And acetaminophen was given.  ICU was consulted for medical management.    7/14: Patient approached at bedside. Having bowel movements. Voiding appropriately. Pain well controlled. Primary team is trauma. No acute complaints.    Past Medical History: Anxiety, arthritis, A-fib, CAD cardiomyopathy, hypertension.  Family History: Heart attack, father..  Social History: 41.5-pack-year smoking history. 12 drinks of alcohol/week.  Occasional marijuana use..    ROS   Denied headache, visual changes, chest pain, shortness of breath, N/V/D, difficulty voiding, numbness/tingling, fever/chills.    Scheduled Meds:   sodium chloride flush  5-40 mL IntraVENous 2 times per day    polyethylene glycol  17 g Oral Daily    bacitracin   Topical TID    famotidine  20 mg Oral BID    amiodarone  200 mg Oral Daily    bumetanide  1 mg Oral Daily    [Held by provider] empagliflozin  10 mg Oral Daily    [Held by provider] metoprolol succinate  100 mg Oral Daily    potassium chloride  10 mEq Oral Daily    rosuvastatin  40 mg Oral Nightly    [Held by provider] sacubitril-valsartan  1 tablet Oral BID    multivitamin  1 tablet Oral Daily    folic acid  1 mg Oral Daily    levETIRAcetam  500 mg

## 2024-07-14 NOTE — ED NOTES
ED to inpatient nurses report      Chief Complaint:  Chief Complaint   Patient presents with    Trauma Transfer    Fall     Present to ED from: home    MOA:     LOC: alert and orientated to name, place, date  Mobility: Independent  Oxygen Baseline: room air    Current needs required: room air     Code Status:   Full Code    What abnormal results were found and what did you give/do to treat them? Subdural hematoma, IV fluids  Any procedures or intervention occur? CT, x-ray    Mental Status:  Level of Consciousness: Alert (0)    Psych Assessment:        Vitals:  Patient Vitals for the past 24 hrs:   BP Temp Temp src Pulse Resp SpO2 Weight   07/14/24 0300 113/68 -- -- 58 -- 93 % --   07/14/24 0249 104/67 -- -- 56 -- 92 % --   07/14/24 0230 105/70 -- -- 56 -- 92 % --   07/14/24 0213 106/69 -- -- 56 -- 95 % --   07/14/24 0200 117/71 97.8 °F (36.6 °C) Oral 58 18 93 % 62.4 kg (137 lb 8 oz)        LDAs:   Peripheral IV 07/14/24 Right Antecubital (Active)   Site Assessment Clean, dry & intact 07/14/24 0214   Phlebitis Assessment No symptoms 07/14/24 0214   Infiltration Assessment 0 07/14/24 0214   Dressing Status Clean, dry & intact 07/14/24 0214       Peripheral IV 07/14/24 Left Antecubital (Active)   Site Assessment Clean, dry & intact 07/14/24 0214   Phlebitis Assessment No symptoms 07/14/24 0214   Infiltration Assessment 0 07/14/24 0214   Dressing Type Transparent 07/14/24 0214       Ambulatory Status:  No data recorded    Diagnosis:  DISPOSITION Admitted 07/14/2024 03:17:54 AM   Final diagnoses:   None        Consults:  IP CONSULT TO NEUROSURGERY  IP CONSULT TO CRITICAL CARE     Pain Score:  Pain Assessment  Pain Assessment: 0-10  Pain Level: 4    C-SSRS:        Sepsis Screening:       Yuriy Fall Risk:       Swallow Screening        Preferred Language:   English      ALLERGIES     Patient has no known allergies.    SURGICAL HISTORY       Past Surgical History:   Procedure Laterality Date    CARDIOVERSION      CARPAL

## 2024-07-14 NOTE — PROGRESS NOTES
[facility]  Trauma Surgery - Dr. Angel Wilkins  Daily Progress Note    Pt Name: Malik Ramirez  Medical Record Number: [id number]  Date of Birth 1960   Today's Date: 7/14/2024    HD: # 0    CC: headache     ASSESSMENT  1.  Active Hospital Problems    Diagnosis Date Noted    Permanent atrial fibrillation (HCC) [I48.21]      Priority: High    Subdural hematoma (HCC) [S06.5XAA] 07/14/2024    Subarachnoid hemorrhage (HCC) [I60.9] 07/14/2024    Fall [W19.XXXA] 07/14/2024    Scalp hematoma, initial encounter [S00.03XA] 07/14/2024    Anticoagulated [Z79.01] 07/14/2024    Antiplatelet or antithrombotic long-term use [Z79.02] 07/14/2024    Trauma [T14.90XA] 07/14/2024    Traumatic intraparenchymal hemorrhage (HCC) [S06.30AA] 07/14/2024    Chronic combined systolic and diastolic CHF (congestive heart failure) (HCC) [I50.42] 12/09/2021    Ischemic cardiomyopathy [I25.5]          PLAN  Patient admitted under Trauma Services (w/ tele) to the ICU     Trauma by fall   - Fall precautions   - PT OT eval and treat as able    Subarachnoid, subdural, intraparenchymal hemorrhage   - CT head at [facility] reads Small degree of subarachnoid hemorrhage seen along the left temporal lobe inferiorly, with some possible hemorrhagic cortical contusion or subdural hemorrhage seen along the floor of the middle cranial fossa along the   inferior aspect of the left temporal lobe. Small degree of suspected subdural hemorrhage noted along the falx as well as asymmetrically along the left tentorial leaflet.  This measures up to a couple mm in greatest thickness.    - CT interpretation here reads 8 x 4 mm small acute hematoma in the lateral periphery of the right parietal lobe, probably extra-axial. Small acute subdural and subarachnoid hemorrhage in the anterior inferior left temporal region.   - Consult to neurosurgery    - Regular neuro checks   - Maintain systolic blood pressure between 100-160   - TXA given trauma dosing   -  four extremities.  No extremity tenderness to palpation.  Range of motion intact.  Strength 4/5 bilaterally with  strength and plantar/dorsiflexion.  SKIN: Warm and dry  WOUNDS: Bilateral forearms and legs covered in bruises, ecchymosis of various stages, resolving, left dorsum of the hand.  Left posterior forearm with clot-like mounds of old dried blood.      Tertiary exam completed with no new traumatic injuries identified. Physical findings as above.      LABS  CBC :   Recent Labs     07/14/24 0200 07/14/24 0456   WBC 12.6* 10.1   HGB 13.3* 12.7*   HCT 38.4* 35.6*   .1* 99.4*    194     BMP:   Recent Labs     07/14/24 0200 07/14/24  0456 07/14/24  0736 07/14/24  0940   * 128* 127* 127*   K 3.4* 3.7  --   --    CL 92* 93*  --   --    CO2 20* 21*  --   --    BUN 6* 6*  --   --    CREATININE 0.6 0.5  --   --      COAGS:   Recent Labs     07/14/24 0200   APTT 34.2   INR 1.03     Pancreas/HFP:  No results for input(s): \"LIPASE\", \"AMYLASE\" in the last 72 hours.  Recent Labs     07/14/24 0200   AST 62*   ALT 35   BILITOT 0.6   ALKPHOS 68       RADIOLOGY (Last 24 hours)  CT HEAD WO CONTRAST    Result Date: 7/14/2024  ** ADDENDUM #1 ** This report was discussed with Luis Hester RN on Jul 14, 2024 04:16:00 EDT. This document has been electronically signed by: Keisha Carvalho on 07/14/2024 04:17 AM ** ORIGINAL REPORT ** CT Head WO Contrast COMPARISON: CT/SR - CT INTERPRETATION OF OUTSIDE IMAGES - 07/13/2024 11:13 PM EDT FINDINGS: Right parietal extra-axial hemorrhage now measures 5 mm (previously 8 mm). Increased left temporal parenchymal hemorrhage measuring up to approximately 4.0 x 1.8 cm (previously 0.7 x 0.5 cm), with adjacent edema and local sulcal effacement. No midline shift. No evidence of acute infarction. Diffuse cortical volume loss. Nonspecific white matter hypodensities, most commonly associated with chronic microangiopathic changes. Chronic posterior right MCA distribution  encephalomalacia. Small chronic right frontal encephalomalacia No hydrocephalus. Partial effacement of the temporal horn of the left lateral ventricle. Visualized orbits are normal. Clear paranasal sinuses. Clear mastoid air cells. No acute fracture. Redemonstration of right scalp soft tissue injury.     Increased left temporal parenchymal hemorrhage with associated edema, mass effect, and local sulcal effacement. Slightly decreased right parietal extra-axial hemorrhage. Nonemergent/incidental findings in the report. This document has been electronically signed by: Blayne Downs MD on 07/14/2024 04:13 AM All CTs at this facility use dose modulation techniques and iterative reconstructions, and/or weight-based dosing when appropriate to reduce radiation to a low as reasonably achievable.    XR PELVIS (1-2 VIEWS)    Result Date: 7/14/2024  Pelvis one view COMPARISON: No prior FINDINGS: No fracture or dislocation is seen. Bilateral hip joint spaces are maintained.     No acute osseous abnormality. This document has been electronically signed by: Gray Clarke MD on 07/14/2024 03:12 AM    XR CHEST PORTABLE    Result Date: 7/14/2024  Chest one view COMPARISON: No prior FINDINGS: No pulmonary consolidation, pleural effusion or pneumothorax is seen. The cardiac silhouette is normal in size. No acute osseous abnormality is seen on this examination.     No acute disease. This document has been electronically signed by: Gray Clarke MD on 07/14/2024 03:06 AM    CT INTERPRETATION OF OUTSIDE IMAGES    Result Date: 7/14/2024  CT cervical spine without contrast COMPARISON: No prior FINDINGS: Please note that CT is limited in comparison to MRI for evaluation of the spinal cord parenchyma, nerve roots and intervertebral discs. The cervical vertebral bodies are normal in height. No fracture or subluxation is seen. There is straightening of the cervical lordosis. There are anterior marginal osteophytes at C3-4 through C6-7. There is no

## 2024-07-14 NOTE — PROGRESS NOTES
[facility]  PHYSICAL THERAPY MISSED TREATMENT NOTE  [facility] ICU 4D    Date: 2024  Patient Name: Malik Ramirez        MRN: [id number]   : 1960  (63 y.o.)  Gender: male                REASON FOR MISSED TREATMENT:  Orders received for PT evaluation and treatment; Patient is s/p fall. Patient was intoxicated and hit his head with LOC. At [place] he was found to have both subdural and subarachnoid hemorrhage. At this time, patient still with strict BEDREST orders per RN. Will check back 7/15/24.       ALBERTO AgueroT

## 2024-07-14 NOTE — CONSULTS
Brief Intervention and Referral to Treatment Summary    Patient was provided PHQ-9, AUDIT-C and DAST Screening:      PHQ-9 Score: 1  AUDIT-C Score:  8  DAST Score:  0    Patient’s substance use is considered     Dependent      Patient’s depression is considered:     Minimal    Brief Education Was Provided    Patient was receptive      Brief Intervention Is Provided (Only for AUDIT-C or DAST)     Patient reports readiness to decrease and/or stop use and a plan was discussed       Injured Trauma Survivor Screening  1.  When you were injured or right afterward   Did you think you were going to die? RESPONSES; YES+1/NO: NO  Do you think this was done to you intentionally? NO    Since your injury  Have you felt more restless, tense or jumpy than usual? RESPONSES; YES+1/NO: NO  Have you found yourself unable to stop worrying? RESPONSES; YES+1/NO: NO  Do you find yourself thinking that the world is unsafe and that people are not to be trusted? RESPONSES; YES+1/NO: NO    TOTAL SCORE from ITSS Questions 1 and 2:   NOTE: A score of greater than or equal to 2 is considered positive for PTSD risk and is to receive a community resource packet to link with appropriate providers.    Recommendations/Referrals for Brief and/or Specialized Treatment Provided to Patient:      Pt reports having a 6 pack of beer 2-3 times a week. Reports he is active with [facility] receiving care for his depression and also sees a therapist. Pt states he would like to quit drinking alcohol. Encouraged pt to reach out to [facility] to see if they offer any addiction services. Pt was receptive to a resource packet.    Of note, pt did request his family to step out for this consult.

## 2024-07-14 NOTE — PROGRESS NOTES
[facility]  SPEECH THERAPY  STRZ ICU 4D  Speech - Language - Cognitive Evaluation + Cognitive Therapy    SLP Individual Minutes  Time In: 0854  Time Out: 918  Minutes: 24  Timed Code Treatment Minutes: 9 Minutes  Speech, language, cognitive evaluation: 15 minutes  Cognitive therapy: 9 minutes       Date: 2024  Patient Name: Malik Ramirez      CSN: [id number]   : 1960  (63 y.o.)  Gender: male   Referring Physician:  Anita Ritter PA-C   Diagnosis: Trauma  Precautions: Fall risk, low stimulation guidelines  History of Present Illness/Injury: Patient admitted to [facility] with above med dx; please refer to physician H&P for full details. Per chart review, \"63 old male with PMH of A-fib on Eliquis, CAD on Plavix, alcohol abuse, anxiety who presented to [facility] as a transfer following a ground-level fall.  Patient was intoxicated and hit his head with LOC.  At [place] he was found to have both subdural and subarachnoid hemorrhage.  Trauma is admitting service and ICU team consulted for medical management.  Dr. Browne, neurosurgery following for brain bleeds.  Patient notes that he had \"3 or 4\" beers today.  He sustained a ground-level fall which she is not exactly sure of the surrounding circumstances.  He does note that he lost consciousness, this fall was witnessed by his son.  Son then called EMS.  Patient is alert and oriented x 4.  His GCS is 15.  He denies any chest pain, shortness of breath, lower extremity pain, abdominal pain.  Does note headache.\"    CTH 2024  IMPRESSION:  Increased left temporal parenchymal hemorrhage with associated edema, mass   effect, and local sulcal effacement.  Slightly decreased right parietal extra-axial hemorrhage.  Nonemergent/incidental findings in the report    Past Medical History:   Diagnosis Date    Anxiety     Arthritis     Atrial fibrillation (HCC)     CAD (coronary artery disease)     atrial fib    Cardiomyopathy (HCC)      consult. Will complete clinical swallow evaluation as permitted.     RECOMMENDATIONS/ASSESSMENT:  DIAGNOSTIC IMPRESSIONS:  Patient presents with a moderate cognitive impairment as derived by clinical findings outlined above to negatively influence abilities to return to ADLs/IADLs per PLOF. Positive findings for concussive-like symptoms to potentially influence cognitive performance at date with fatigue notable. Expressive and receptive language domains grossly intact, no apparent communicative breakdowns. Speech intelligibility best approximates 100%, absence for dysarthria. Skilled ST services are recommended to address the above aforementioned deficits to improve cognitive performance for optimal re-integration into home environment with support of family. Considerations at this time for IPR consult for intensive therapeutic interventions.      Rehabilitation Potential: good  Discharge Recommendations: Inpatient Rehabilitation    EDUCATION:  Learner: Patient  Education:  Reviewed results and recommendations of this evaluation, Reviewed ST goals and Plan of Care, Reviewed recommendations for follow-up, Education Related to Potential Risks and Complications Due to Impairment/Illness/Injury, Education Related to Prevention of Recurrence of Impairment/Illness/Injury, and Education Related to Health Promotion and Wellness  Evaluation of Education: Verbalizes understanding, Demonstrates with assistance, Needs further instruction, and Family not present    PLAN:  Speech Therapy evaluation to assess swallow function  Skilled SLP intervention on acute care 3-5 x per week or until goals met and/or patient plateaus in function.  Specific interventions for next session may include: cognitive rehabilitation.    PATIENT GOAL:    Did not state.  Will further assess during treatment.    SHORT TERM GOALS:  Short Term Goals  Time Frame for Short Term Goals: 2 weeks  Goal 1: Patient will complete functional carryover tasks (O-log,

## 2024-07-14 NOTE — PROGRESS NOTES
Tenecteplase (TNK) Patient Education      What is Tenecteplase (TNK)?  Tenecteplase is a medication that helps to \"dissolve clots\". It is given under healthcare professional supervision by IV route.     When is Tenecteplase (TNK) used?  Tenecteplase is used off-label (non-FDA approved) within 3-4.5 hours of stroke symptoms onset.     What are the benefits of Tenecteplase (TNK)?  Research supports those patients receiving TNK tend to have a greater chance of recovery with minimal or no disability 3 months after stroke. The earlier the TNK is started, the better the outcome.    What are the risks of Tenecteplase (TNK)?  The major risk associated with TNK therapy is a potential for a brain hemorrhage. Brain hemorrhage, it if occurs, can result in significant worsening of stroke symptoms, may increase ultimate disability, and may even cause death. TNK can also cause hemorrhage elsewhere in t he body, such as the intestines, kidneys, and other organs. The earlier that TNK is started, the lower risk of a brain hemorrhage.         TNK education provided to patient. Patient verbalizes understanding.

## 2024-07-14 NOTE — PROGRESS NOTES
Patient arrived to unit from ED via bed. Patient transferred to ICU bed and placed on continuous ICU bedside monitor. Patient admitted for Trauma [T14.90XA]. Vitals obtained. See flowsheets. Patient's IV access includes 20g right ac, 20g left ac. Current infusions and rates of infusion include n/a. Assessment completed by Luis RADER. Two nurse skin assessment completed by Luis RADER and Jayna RADER. See flowsheets for assessment details. Policies and procedures of ICU able to be explained to patient at this time. Family member(s)/representative(s) present at time of admission include n/a. Patient rights explained to family member(s)/representatives and patient, as able. Patient/patient's family member(s)/representative(s) N/A to have physician notified of their admission. All questions posed by patient's family member(s)/representative(s) and patient answered at this time.

## 2024-07-14 NOTE — ED PROVIDER NOTES
[facility] EMERGENCY DEPT    EMERGENCY MEDICINE      Pt Name: Malik Ramirez  MRN: [id number]  Birthdate 1960  Date of evaluation: 7/14/2024  Treating Physician: Dr. Nguyen  Resident Physician: Le Lujan MD    CHIEF COMPLAINT       Chief Complaint   Patient presents with    Trauma Transfer     History obtained from chart review and the patient.    HISTORY OF PRESENT ILLNESS    HPI    Malik Ramirez is a 63 y.o. male with PMH of anxiety, A-fib on Eliquis, CAD on Plavix, alcohol abuse presents to the emergency department as a transfer from [place] for intracranial hemorrhage.  Patient reportedly had subdural and subarachnoid hemorrhage after a fall earlier today.  Patient did endorse alcohol and loss of consciousness.  He stated fall was witnessed by his son.  Patient was given 2 g of Kcentra and 10 mg of vitamin K at [facility].  Level 2 trauma was activated.  Patient denying any weakness, numbness, vision changes, fever, cough.    The patient has no other acute complaints at this time.    PAST MEDICAL AND SURGICAL HISTORY     Past Medical History:   Diagnosis Date    Anxiety     Arthritis     Atrial fibrillation (HCC)     CAD (coronary artery disease)     atrial fib    Cardiomyopathy (HCC)     Hypertension        Past Surgical History:   Procedure Laterality Date    CARDIOVERSION      CARPAL TUNNEL RELEASE Right     ROTATOR CUFF REPAIR      TONSILLECTOMY         CURRENT MEDICATIONS     Previous Medications    AMIODARONE (CORDARONE) 200 MG TABLET    Take 1 tablet by mouth daily    BUMETANIDE (BUMEX) 1 MG TABLET    TAKE 1 TABLET BY MOUTH EVERY DAY    CLOPIDOGREL (PLAVIX) 75 MG TABLET    Take 1 tablet by mouth daily    DIAZEPAM (VALIUM) 5 MG TABLET    Take 0.5 tablets by mouth as needed for Anxiety.    ELIQUIS 5 MG TABS TABLET    TAKE 1 TABLET BY MOUTH TWICE A DAY    EMPAGLIFLOZIN (JARDIANCE) 10 MG TABLET    Take 1 tablet by mouth daily    METOPROLOL SUCCINATE (TOPROL XL) 100 MG EXTENDED RELEASE

## 2024-07-14 NOTE — PLAN OF CARE
Problem: Discharge Planning  Goal: Discharge to home or other facility with appropriate resources  7/14/2024 0932 by Vera Dodd RN  Outcome: Progressing  Flowsheets (Taken 7/14/2024 0800)  Discharge to home or other facility with appropriate resources:   Identify barriers to discharge with patient and caregiver   Arrange for needed discharge resources and transportation as appropriate   Identify discharge learning needs (meds, wound care, etc)  7/14/2024 0546 by Luis Hester RN  Outcome: Progressing  Flowsheets (Taken 7/14/2024 0347)  Discharge to home or other facility with appropriate resources:   Identify barriers to discharge with patient and caregiver   Arrange for needed discharge resources and transportation as appropriate   Identify discharge learning needs (meds, wound care, etc)     Problem: Pain  Goal: Verbalizes/displays adequate comfort level or baseline comfort level  7/14/2024 0932 by Vera Dodd RN  Outcome: Progressing  Flowsheets (Taken 7/14/2024 0800)  Verbalizes/displays adequate comfort level or baseline comfort level:   Encourage patient to monitor pain and request assistance   Assess pain using appropriate pain scale   Administer analgesics based on type and severity of pain and evaluate response   Implement non-pharmacological measures as appropriate and evaluate response   Consider cultural and social influences on pain and pain management   Notify Licensed Independent Practitioner if interventions unsuccessful or patient reports new pain  7/14/2024 0546 by Luis Hester RN  Outcome: Progressing     Problem: Safety - Adult  Goal: Free from fall injury  7/14/2024 0932 by Vera Dodd RN  Outcome: Progressing  Flowsheets (Taken 7/14/2024 0932)  Free From Fall Injury: Instruct family/caregiver on patient safety  7/14/2024 0546 by Luis Hester RN  Outcome: Progressing     Problem: ABCDS Injury Assessment  Goal: Absence of physical injury  7/14/2024 0932 by Yousif  DIOR Gamez  Outcome: Progressing  Flowsheets (Taken 7/14/2024 0932)  Absence of Physical Injury: Implement safety measures based on patient assessment  7/14/2024 0546 by Luis Hester, RN  Outcome: Progressing     Problem: Chronic Conditions and Co-morbidities  Goal: Patient's chronic conditions and co-morbidity symptoms are monitored and maintained or improved  Outcome: Progressing  Flowsheets (Taken 7/14/2024 0800)  Care Plan - Patient's Chronic Conditions and Co-Morbidity Symptoms are Monitored and Maintained or Improved:   Monitor and assess patient's chronic conditions and comorbid symptoms for stability, deterioration, or improvement   Collaborate with multidisciplinary team to address chronic and comorbid conditions and prevent exacerbation or deterioration   Update acute care plan with appropriate goals if chronic or comorbid symptoms are exacerbated and prevent overall improvement and discharge     Problem: Neurosensory - Adult  Goal: Achieves stable or improved neurological status  Outcome: Progressing  Flowsheets (Taken 7/14/2024 0932)  Achieves stable or improved neurological status:   Assess for and report changes in neurological status   Initiate measures to prevent increased intracranial pressure   Maintain blood pressure and fluid volume within ordered parameters to optimize cerebral perfusion and minimize risk of hemorrhage   Monitor temperature, glucose, and sodium. Initiate appropriate interventions as ordered     Problem: Skin/Tissue Integrity - Adult  Goal: Skin integrity remains intact  Outcome: Progressing  Flowsheets (Taken 7/14/2024 0932)  Skin Integrity Remains Intact:   Every 4-6 hours minimum: Change oxygen saturation probe site   Monitor for areas of redness and/or skin breakdown   Assess vascular access sites hourly  Goal: Incisions, wounds, or drain sites healing without S/S of infection  Outcome: Progressing  Flowsheets (Taken 7/14/2024 0932)  Incisions, Wounds, or Drain Sites

## 2024-07-14 NOTE — PLAN OF CARE
Problem: Discharge Planning  Goal: Discharge to home or other facility with appropriate resources  Outcome: Progressing  Flowsheets (Taken 7/14/2024 8838)  Discharge to home or other facility with appropriate resources:   Identify barriers to discharge with patient and caregiver   Arrange for needed discharge resources and transportation as appropriate   Identify discharge learning needs (meds, wound care, etc)     Problem: Pain  Goal: Verbalizes/displays adequate comfort level or baseline comfort level  Outcome: Progressing     Problem: Safety - Adult  Goal: Free from fall injury  Outcome: Progressing     Problem: ABCDS Injury Assessment  Goal: Absence of physical injury  Outcome: Progressing

## 2024-07-14 NOTE — PROGRESS NOTES
[facility]  OCCUPATIONAL THERAPY MISSED TREATMENT NOTE  STRZ ICU 4D  [id number]      Date: 2024  Patient Name: Malik Ramirez        CSN: [id number]   : 1960  (63 y.o.)  Gender: male                REASON FOR MISSED TREATMENT:  Patient is s/p fall. Patient was intoxicated and hit his head with LOC. At [place] he was found to have both subdural and subarachnoid hemorrhage. At this time, patient still with strict BEDREST orders per RN. Will check back 7/15/24    Maddie London, OTR/L [id number]

## 2024-07-15 ENCOUNTER — APPOINTMENT (OUTPATIENT)
Age: 64
End: 2024-07-15
Payer: MEDICARE

## 2024-07-15 ENCOUNTER — APPOINTMENT (OUTPATIENT)
Dept: CT IMAGING | Age: 64
End: 2024-07-15
Payer: MEDICARE

## 2024-07-15 PROBLEM — I60.9 SAH (SUBARACHNOID HEMORRHAGE) (HCC): Status: ACTIVE | Noted: 2024-07-15

## 2024-07-15 PROBLEM — S06.5XAA SDH (SUBDURAL HEMATOMA) (HCC): Status: ACTIVE | Noted: 2024-07-15

## 2024-07-15 PROBLEM — S06.30AA: Status: ACTIVE | Noted: 2024-07-15
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
## 2024-07-15 LAB
ANION GAP SERPL CALC-SCNC: 10 MEQ/L (ref 8–16)
BASOPHILS ABSOLUTE: 0.1 THOU/MM3 (ref 0–0.1)
BASOPHILS NFR BLD AUTO: 0.7 %
BUN SERPL-MCNC: 9 MG/DL (ref 7–22)
CALCIUM SERPL-MCNC: 7.8 MG/DL (ref 8.5–10.5)
CHLORIDE SERPL-SCNC: 101 MEQ/L (ref 98–111)
CO2 SERPL-SCNC: 23 MEQ/L (ref 23–33)
CREAT SERPL-MCNC: 0.7 MG/DL (ref 0.4–1.2)
DEPRECATED RDW RBC AUTO: 46.4 FL (ref 35–45)
ECHO AO ASC DIAM: 3 CM
ECHO AO ASCENDING AORTA INDEX: 1.61 CM/M2
ECHO AO SINUS VALSALVA DIAM: 2.9 CM
ECHO AO SINUS VALSALVA INDEX: 1.56 CM/M2
ECHO AO ST JNCT DIAM: 2.7 CM
ECHO AV CUSP MM: 1.6 CM
ECHO BSA: 1.83 M2
ECHO LA DIAMETER INDEX: 1.67 CM/M2
ECHO LA DIAMETER: 3.1 CM
ECHO LV FRACTIONAL SHORTENING: 30 % (ref 28–44)
ECHO LV INTERNAL DIMENSION DIASTOLE INDEX: 2.47 CM/M2
ECHO LV INTERNAL DIMENSION DIASTOLIC: 4.6 CM (ref 4.2–5.9)
ECHO LV INTERNAL DIMENSION SYSTOLIC INDEX: 1.72 CM/M2
ECHO LV INTERNAL DIMENSION SYSTOLIC: 3.2 CM
ECHO LV IVSD: 0.8 CM (ref 0.6–1)
ECHO LV MASS 2D: 127.7 G (ref 88–224)
ECHO LV MASS INDEX 2D: 68.6 G/M2 (ref 49–115)
ECHO LV POSTERIOR WALL DIASTOLIC: 0.9 CM (ref 0.6–1)
ECHO LV RELATIVE WALL THICKNESS RATIO: 0.39
ECHO RV INTERNAL DIMENSION: 2.4 CM
ECHO RV TAPSE: 2.7 CM (ref 1.7–?)
EOSINOPHIL NFR BLD AUTO: 0.2 %
EOSINOPHILS ABSOLUTE: 0 THOU/MM3 (ref 0–0.4)
ERYTHROCYTE [DISTWIDTH] IN BLOOD BY AUTOMATED COUNT: 12.3 % (ref 11.5–14.5)
GFR SERPL CREATININE-BSD FRML MDRD: > 90 ML/MIN/1.73M2
GLUCOSE SERPL-MCNC: 119 MG/DL (ref 70–108)
HCT VFR BLD AUTO: 34 % (ref 42–52)
HGB BLD-MCNC: 11.6 GM/DL (ref 14–18)
IMM GRANULOCYTES # BLD AUTO: 0.18 THOU/MM3 (ref 0–0.07)
IMM GRANULOCYTES NFR BLD AUTO: 2 %
LYMPHOCYTES ABSOLUTE: 0.7 THOU/MM3 (ref 1–4.8)
LYMPHOCYTES NFR BLD AUTO: 8.2 %
MCH RBC QN AUTO: 34.8 PG (ref 26–33)
MCHC RBC AUTO-ENTMCNC: 34.1 GM/DL (ref 32.2–35.5)
MCV RBC AUTO: 102.1 FL (ref 80–94)
MONOCYTES ABSOLUTE: 1.2 THOU/MM3 (ref 0.4–1.3)
MONOCYTES NFR BLD AUTO: 13.4 %
NEUTROPHILS ABSOLUTE: 6.7 THOU/MM3 (ref 1.8–7.7)
NEUTROPHILS NFR BLD AUTO: 75.5 %
NRBC BLD AUTO-RTO: 0 /100 WBC
PLATELET # BLD AUTO: 172 THOU/MM3 (ref 130–400)
PMV BLD AUTO: 9.4 FL (ref 9.4–12.4)
POTASSIUM SERPL-SCNC: 4 MEQ/L (ref 3.5–5.2)
RBC # BLD AUTO: 3.33 MILL/MM3 (ref 4.7–6.1)
SODIUM SERPL-SCNC: 131 MEQ/L (ref 135–145)
SODIUM SERPL-SCNC: 133 MEQ/L (ref 135–145)
SODIUM SERPL-SCNC: 134 MEQ/L (ref 135–145)
SODIUM SERPL-SCNC: 134 MEQ/L (ref 135–145)
SODIUM SERPL-SCNC: 135 MEQ/L (ref 135–145)
SODIUM SERPL-SCNC: 135 MEQ/L (ref 135–145)
SODIUM SERPL-SCNC: 137 MEQ/L (ref 135–145)
SODIUM SERPL-SCNC: 138 MEQ/L (ref 135–145)
SODIUM SERPL-SCNC: 140 MEQ/L (ref 135–145)
SODIUM SERPL-SCNC: 140 MEQ/L (ref 135–145)
WBC # BLD AUTO: 8.9 THOU/MM3 (ref 4.8–10.8)

## 2024-07-15 PROCEDURE — 36415 COLL VENOUS BLD VENIPUNCTURE: CPT

## 2024-07-15 PROCEDURE — 99232 SBSQ HOSP IP/OBS MODERATE 35: CPT | Performed by: NEUROLOGICAL SURGERY

## 2024-07-15 PROCEDURE — 97530 THERAPEUTIC ACTIVITIES: CPT

## 2024-07-15 PROCEDURE — 97166 OT EVAL MOD COMPLEX 45 MIN: CPT

## 2024-07-15 PROCEDURE — 93307 TTE W/O DOPPLER COMPLETE: CPT | Performed by: NUCLEAR MEDICINE

## 2024-07-15 PROCEDURE — 2580000003 HC RX 258: Performed by: PHYSICIAN ASSISTANT

## 2024-07-15 PROCEDURE — 2580000003 HC RX 258

## 2024-07-15 PROCEDURE — 92610 EVALUATE SWALLOWING FUNCTION: CPT

## 2024-07-15 PROCEDURE — 70450 CT HEAD/BRAIN W/O DYE: CPT

## 2024-07-15 PROCEDURE — 2000000000 HC ICU R&B

## 2024-07-15 PROCEDURE — 6360000002 HC RX W HCPCS

## 2024-07-15 PROCEDURE — 2580000003 HC RX 258: Performed by: NEUROLOGICAL SURGERY

## 2024-07-15 PROCEDURE — 2580000003 HC RX 258: Performed by: INTERNAL MEDICINE

## 2024-07-15 PROCEDURE — 97129 THER IVNTJ 1ST 15 MIN: CPT

## 2024-07-15 PROCEDURE — 97163 PT EVAL HIGH COMPLEX 45 MIN: CPT

## 2024-07-15 PROCEDURE — 99291 CRITICAL CARE FIRST HOUR: CPT | Performed by: INTERNAL MEDICINE

## 2024-07-15 PROCEDURE — 6370000000 HC RX 637 (ALT 250 FOR IP)

## 2024-07-15 PROCEDURE — 93307 TTE W/O DOPPLER COMPLETE: CPT

## 2024-07-15 PROCEDURE — 85025 COMPLETE CBC W/AUTO DIFF WBC: CPT

## 2024-07-15 PROCEDURE — 84295 ASSAY OF SERUM SODIUM: CPT

## 2024-07-15 PROCEDURE — 99232 SBSQ HOSP IP/OBS MODERATE 35: CPT | Performed by: SURGERY

## 2024-07-15 PROCEDURE — 6370000000 HC RX 637 (ALT 250 FOR IP): Performed by: PHYSICIAN ASSISTANT

## 2024-07-15 PROCEDURE — 80048 BASIC METABOLIC PNL TOTAL CA: CPT

## 2024-07-15 RX ORDER — DEXTROSE MONOHYDRATE 50 MG/ML
INJECTION, SOLUTION INTRAVENOUS CONTINUOUS
Status: ACTIVE | OUTPATIENT
Start: 2024-07-15 | End: 2024-07-15

## 2024-07-15 RX ORDER — 3% SODIUM CHLORIDE 3 G/100ML
50 INJECTION, SOLUTION INTRAVENOUS CONTINUOUS
Status: DISCONTINUED | OUTPATIENT
Start: 2024-07-15 | End: 2024-07-16

## 2024-07-15 RX ORDER — LANOLIN ALCOHOL/MO/W.PET/CERES
6 CREAM (GRAM) TOPICAL NIGHTLY PRN
Status: DISCONTINUED | OUTPATIENT
Start: 2024-07-15 | End: 2024-07-18 | Stop reason: HOSPADM

## 2024-07-15 RX ADMIN — FAMOTIDINE 20 MG: 20 TABLET, FILM COATED ORAL at 19:38

## 2024-07-15 RX ADMIN — SODIUM CHLORIDE 25 ML/HR: 3 INJECTION, SOLUTION INTRAVENOUS at 13:40

## 2024-07-15 RX ADMIN — Medication 100 MG: at 08:32

## 2024-07-15 RX ADMIN — AMIODARONE HYDROCHLORIDE 200 MG: 200 TABLET ORAL at 08:31

## 2024-07-15 RX ADMIN — SODIUM CHLORIDE, PRESERVATIVE FREE 10 ML: 5 INJECTION INTRAVENOUS at 08:39

## 2024-07-15 RX ADMIN — SODIUM CHLORIDE, PRESERVATIVE FREE 10 ML: 5 INJECTION INTRAVENOUS at 01:04

## 2024-07-15 RX ADMIN — SODIUM CHLORIDE 25 ML/HR: 3 INJECTION, SOLUTION INTRAVENOUS at 08:13

## 2024-07-15 RX ADMIN — LEVETIRACETAM 500 MG: 100 INJECTION INTRAVENOUS at 06:02

## 2024-07-15 RX ADMIN — DEXTROSE MONOHYDRATE: 50 INJECTION, SOLUTION INTRAVENOUS at 00:54

## 2024-07-15 RX ADMIN — Medication 6 MG: at 00:49

## 2024-07-15 RX ADMIN — LEVETIRACETAM 500 MG: 100 INJECTION INTRAVENOUS at 19:37

## 2024-07-15 RX ADMIN — FAMOTIDINE 20 MG: 20 TABLET, FILM COATED ORAL at 08:32

## 2024-07-15 RX ADMIN — BACITRACIN: 500 OINTMENT TOPICAL at 09:45

## 2024-07-15 RX ADMIN — BACITRACIN: 500 OINTMENT TOPICAL at 15:07

## 2024-07-15 RX ADMIN — SODIUM CHLORIDE, PRESERVATIVE FREE 10 ML: 5 INJECTION INTRAVENOUS at 19:39

## 2024-07-15 RX ADMIN — POTASSIUM CHLORIDE 10 MEQ: 750 TABLET, FILM COATED, EXTENDED RELEASE ORAL at 08:39

## 2024-07-15 RX ADMIN — BACITRACIN: 500 OINTMENT TOPICAL at 01:03

## 2024-07-15 RX ADMIN — BUMETANIDE 1 MG: 1 TABLET ORAL at 08:31

## 2024-07-15 RX ADMIN — Medication 6 MG: at 22:12

## 2024-07-15 RX ADMIN — POLYETHYLENE GLYCOL (3350) 17 G: 17 POWDER, FOR SOLUTION ORAL at 08:39

## 2024-07-15 RX ADMIN — Medication 1 TABLET: at 08:32

## 2024-07-15 RX ADMIN — ROSUVASTATIN CALCIUM 40 MG: 20 TABLET, FILM COATED ORAL at 19:39

## 2024-07-15 RX ADMIN — FOLIC ACID 1 MG: 1 TABLET ORAL at 08:31

## 2024-07-15 RX ADMIN — BACITRACIN: 500 OINTMENT TOPICAL at 19:38

## 2024-07-15 RX ADMIN — SODIUM CHLORIDE 50 ML/HR: 3 INJECTION, SOLUTION INTRAVENOUS at 19:35

## 2024-07-15 ASSESSMENT — ENCOUNTER SYMPTOMS: BACK PAIN: 1

## 2024-07-15 NOTE — PROGRESS NOTES
the right parietal lobe, measuring 5.7 x 3.4 mm. This could represent a small focal subdural hematoma given trauma history, though a small extra-axial meningioma is another consideration. No prior comparison available for review. 4. Soft tissue hematoma overlying the right parietal scalp, with soft tissue gas noted as well. No acute calvarial fracture is identified. 5. Encephalomalacia involving the right parietal lobe which is likely related to a prior infarct. 6. No acute osseous abnormality of the cervical spine. 7. 5 mm pulmonary nodule in the right upper lobe.  Follow-up CT optional in 12 months, reference below. 8. Findings regarding intracranial hemorrhage were discussed with Dr. Treviño at 12:09 a.m. on 07/14/2024. RECOMMENDATIONS: Right solid pulmonary nodule within the upper lobe measuring 5 mm. Per Fleischner Society Guidelines, a non-contrast Chest CT at 12 months is optional. If performed and the nodule is stable at 12 months, no further follow-up is recommended. These guidelines do not apply to immunocompromised patients and patients with cancer. Follow up in patients with significant comorbidities as clinically warranted. For lung cancer screening, adhere to Lung-RADS guidelines. Reference: Radiology. 2017; 284(1):228-43.      20 Minutes spent in patient care collectively between subjective/objective examination, chart review, documentation, clinical reasoning and discussion with attending regarding plan/interval changes.    Electronically signed by Anita Ritter PA-C on 7/15/2024 at 12:28 PM      Patient seen and examined independently by me 7/15/2024  Neuro intact  Repeat CT this a.m. shows new intraventricular layering temporal lobe hemorrhage stable  Not having any neurologic symptoms whatsoever  Despite 3% sodium still only in the 130s  Anticoagulants antiplatelets on hold  PT and OT as started  Awaiting neurosurgery clearance to transfer out of the ICU   I personally supervised the  discussed the management of all of the identified problems with the APN or PA.      I formulated the treatment plan for all identified problems and discussed those with the APN or PA .      This management plan was then carried out and the patient's orders for care by the APN or PA.      Total time personally spent on this patient encounter was 36 minutes which includes :  Time does not include procedures.    Please see our orders that were directed and approved by me if there are any new ones for the updated patient care plan.    Above discussed and I agree with documentation and orders placed by Anita PATTERSON    See any additional comments if needed below for any other updated orders and plans.

## 2024-07-15 NOTE — PROGRESS NOTES
[facility]  SPEECH THERAPY  STRZ ICU 4D  Clinical Swallow Evaluation + Cognitive Therapy      SLP Individual Minutes  Time In: 1100  Time Out: 1120  Minutes: 20  Timed Code Treatment Minutes: 12 Minutes  Clinical swallow evaluation: 8 minutes  Cognitive therapy: 12 minutes       DIET ORDER RECOMMENDATIONS AFTER EVALUATION: Regular, thin liquids    Date: 7/15/2024  Patient Name: Malik Ramirez      CSN: [id number]   : 1960  (63 y.o.)  Gender: male   Referring Physician:    Anita Ritter PA-C   Diagnosis: Traumatic intraparenchymal hemorrhage (HCC)    History of Present Illness/Injury:  Patient admitted to [facility] with above med dx; please refer to physician H&P for full details. Per chart review, \"63 old male with PMH of A-fib on Eliquis, CAD on Plavix, alcohol abuse, anxiety who presented to [facility] as a transfer following a ground-level fall.  Patient was intoxicated and hit his head with LOC.  At [place] he was found to have both subdural and subarachnoid hemorrhage.  Trauma is admitting service and ICU team consulted for medical management.  Dr. Browne, neurosurgery following for brain bleeds.  Patient notes that he had \"3 or 4\" beers today.  He sustained a ground-level fall which she is not exactly sure of the surrounding circumstances.  He does note that he lost consciousness, this fall was witnessed by his son.  Son then called EMS.  Patient is alert and oriented x 4.  His GCS is 15.  He denies any chest pain, shortness of breath, lower extremity pain, abdominal pain.  Does note headache.\"     CTH 2024  IMPRESSION:  Increased left temporal parenchymal hemorrhage with associated edema, mass   effect, and local sulcal effacement.  Slightly decreased right parietal extra-axial hemorrhage.  Nonemergent/incidental findings in the report    Past Medical History:   Diagnosis Date    Anxiety     Arthritis     Atrial fibrillation (HCC)     CAD (coronary artery disease)   pharyngeal phase dysfunction and/or airway invasion events without supportive imaging. Patient's swallow physiology does appear appropriate to support PO intake without distress with instrumental evaluation not warranted.     Recommend continuation of regular diet with thin liquids. No further ST services are warranted at this time r/t dysphagia management given baseline status of swallow function likely achieved; please re-consult should further needs be identified.      RECOMMENDATIONS/ASSESSMENT:  Instrumental Evaluation: Instrumental evaluation not indicated at this time.  Diet Recommendations:  Regular, thin liquids  Strategies:  Full Upright Position, Small Bite/Sip, and Alternate Solids and Liquids   Rehabilitation Potential: good  Discharge Recommendations: Continue to Assess Pending Progress    EDUCATION:  Learner: Patient  Education:  Reviewed results and recommendations of this evaluation, Reviewed diet and strategies, Reviewed signs, symptoms and risks of aspiration, and Reviewed recommendations for follow-up  Evaluation of Education: Verbalizes understanding, Needs further instruction, and Family not present    PLAN:  Skilled SLP intervention on acute care 3-5 x per week or until goals met and/or patient plateaus in function.  Specific interventions for next session may include: cognitive rehabilitation .    PATIENT GOAL:    Did not state.  Will further assess during treatment.    SHORT TERM GOALS:  Short Term Goals  Time Frame for Short Term Goals: 2 weeks  Goal 1: Patient will complete functional carryover tasks (O-log, biographics, call light) [facility] achievement of PTA clearance (>25/30 x2-3 consecutive dates) to ascertain readiness to resume new learning skills.  Goal 2: Patient will complete problem solving, verbal/visual reasoning, and executive functioning tasks (daily living requirements) with 70% accuracy, mod cues to assist with successful IADL completion.  Goal 3: Patient will complete

## 2024-07-15 NOTE — PROGRESS NOTES
Awake- A/O to direct questions.  Denies any blurred vision, double vision or headache.  1300- No changes in condition at this time.   1740- Resting with eyes closed- no sign of distress noted.

## 2024-07-15 NOTE — CARE COORDINATION
DISCHARGE PLANNING EVALUATION  7/15/24, 11:18 AM EDT    Reason for Referral: \"consideration of Rehab\"  Decision Maker: makes own decisions.  Current Services: none reported. No dme in the home.  New Services Requested: none requested, pt declines services.  Family/ Social/ Home environment: Assessment completed with pt, states he and his 17 yr old gr'son live together and both share household chores, cooking, cleaning, and pt drives. Pt states he does not use dme. Pt states he has 4 daughters that live in [place] and are very supportive. Pt states he drinks 3 beers every 2 weeks when a friend visits. Family states pt drinks 5 to 6 beers everyday.   Payment Source: Medicare and [email or web address]  Transportation at Discharge: daughter   Post-acute (PAC) provider list was provided to patient. Patient was informed of their freedom to choose PAC provider. Discussed and offered to show the patient the relevant PAC Providers quality and resource use measures on Medicare Compare web site via computer based on patient's goals of care and treatment preferences. Questions regarding selection process were answered.      Teach Back Method used with pt regarding care plan and discharge planning.  Patient verbalized understanding of the plan of care and contribute to goal setting.       Patient preferences and discharge plan: planning home as PTA and has declined services.     Electronically signed by ODILON Kat on 7/15/2024 at 11:18 AM

## 2024-07-15 NOTE — CARE COORDINATION
Case Management Assessment Initial Evaluation    Date/Time of Evaluation: 7/15/2024 12:57 PM  Assessment Completed by: Nanda Celestin RN    If patient is discharged prior to next notation, then this note serves as note for discharge by case management.    Patient Name: Malik Ramirez                   YOB: 1960  Diagnosis: Trauma [T14.90XA]                   Date / Time: 2024  1:53 AM  Location: [address] [place] [address]     Patient Admission Status: Inpatient   Readmission Risk Low 0-14, Mod 15-19), High > 20: Readmission Risk Score: 13.1    Current PCP: Carlos Arellano, DO    Additional Case Management Notes: Presented to [place] ED after was intoxicated, fell and hit head with +LOC. Found to have SDH and SAH. Transferred to [facility] and admitted to ICU. Intensivist and Neurosurgery consulted. TXA given. Started on 3% saline with Q2H Na+ levels. Increased left temporal bleed noted yesterday. Echo ordered.     Afebrile. NSR. NSR. NIH 1: dysarthria. Ox4. Follows commands. SLP/PT/OT. Addiction Services consulted. Telemetry, seizure precautions, SCDs. 3% saline @ 25 ml/hr, amio, bacitracin, po bumex daily, pdpcid, folic acid, IV keppra, prn IV morphine, multivitamin, nicotine patch prn, prn commit lozenges, prn phenobarbital per ETOH withdrawal scale, K+, crestor, thiamine, Electrolyte replacement protocols. Na+ 128 - now 135, CO2 21 - now 23, alb 3.3, ast 62, hgb 11.6.     Procedures: none    Imagin/14 CXR: No acute findings   XR Pelvis: No acute findings   CT Head: Increased left temporal parenchymal hemorrhage with associated edema, mass effect, and local sulcal effacement. Slightly decreased right parietal extra-axial hemorrhage. Nonemergent/incidental findings in the report.  7/15 CT Head: 1. Trace amounts of layering subarachnoid blood in the occipital horns of   the lateral ventricles, new since the prior study. There is no   hydrocephalus.  2. Stable areas of intraparenchymal blood within the  right parietal lobe   and left temporal lobe, as detailed above.  3. Tiny amount of subdural blood in association with the left aspect of   the tentorium cerebelli, this is also stable.  4. No hydrocephalus or herniation.  5. Additional findings are detailed above.    Patient Goals/Plan/Treatment Preferences: Home with 16 yo son. Denies needs, declines HH. Pt states he used to see Dr Carlos Arellano for PCP, but he moved out of area and now sees someone else in same office, not sure of name.     [facility].      07/15/24 1250   Service Assessment   Patient Orientation Alert and Oriented   Cognition Alert   History Provided By Patient   Primary Caregiver Self   Accompanied By/Relationship n/a   Support Systems Children;Family Members   Patient's Healthcare Decision Maker is: Patient Declined (Legal Next of Kin Remains as Decision Maker)   PCP Verified by CM Yes   Prior Functional Level Independent in ADLs/IADLs   Current Functional Level Independent in ADLs/IADLs   Can patient return to prior living arrangement Yes   Ability to make needs known: Good   Family able to assist with home care needs: Yes   Would you like for me to discuss the discharge plan with any other family members/significant others, and if so, who? No   Financial Resources Medicare;Other (Comment)  ([employer] 2nd insurance)   Community Resources None   Social/Functional History   Active  Yes   Discharge Planning   Type of Residence House   Living Arrangements Children  (with 16 yo son)   Current Services Prior To Admission None   Potential Assistance Needed N/A   DME Ordered? No   Potential Assistance Purchasing Medications No   Type of Home Care Services None   Patient expects to be discharged to: House   Services At/After Discharge   Confirm Follow Up Transport Family   Condition of Participation: Discharge Planning   The Plan for Transition of Care is related to the following treatment goals: \"Head  to be fixed where it was bleeding.\"

## 2024-07-15 NOTE — PROGRESS NOTES
[facility]  INPATIENT PHYSICAL THERAPY  EVALUATION  [facility] ICU 4D - 4D-18/018-A    Time In: 0945  Time Out: 1010  Timed Code Treatment Minutes: 10 Minutes  Minutes: 25          Date: 7/15/2024  Patient Name: Malik Ramirez,  Gender:  male        MRN: [id number]  : 1960  (63 y.o.)      Referring Practitioner: LEONARDO Jensen  Diagnosis: trauma  Additional Pertinent Hx: per MD:63 M with PMHx A-fib on Eliquis, CAD on Plavix, alcohol abuse, CHF transferred from [facility] after sustaining a witnessed fall at home, positive head strike and loss of consciousness.  Patient notes he was drinking alcohol at the time.  Multiple areas of ecchymosis across bilateral arms.  CT at [place] showed subdural, subarachnoid hemorrhages.  Repeat CT at [facility] showed increased left temporal parenchymal hemorrhage with associated edema, mass effect, and local sulcal effacement, without midline shift. pt c/o HA.     Restrictions/Precautions:  Restrictions/Precautions: Fall Risk    Subjective:  Chart Reviewed: Yes  Patient assessed for rehabilitation services?: Yes  Subjective: pleasant and cooperative. pt c/o feeling cold a lot.    General:        Hearing: Exceptions to WFL  Hearing Exceptions: Hard of hearing/hearing concerns       Pain: no pain per pt    Vitals:   BP MAP HR  Sup  128/70 89 58  Sit  129/69 88 58  Std 1'  112/63 79 61  Std 5'  136/73 95 55    On room air with O2 sat at 94-98% during session    Social/Functional History:    Lives With: Son (16yo)  Type of Home: House  Home Layout: One level  Home Access: Stairs to enter without rails  Entrance Stairs - Number of Steps: 2  Home Equipment: None                   Ambulation Assistance: Independent  Transfer Assistance: Independent          Additional Comments: per pt hx of 2 falls when he gets up too quick    OBJECTIVE:  Range of Motion:  Bilateral Lower Extremity: WNL    Strength:  Bilateral Lower Extremity: WFL    Sensation WNL to lt touch BLE

## 2024-07-15 NOTE — PLAN OF CARE
Problem: Discharge Planning  Goal: Discharge to home or other facility with appropriate resources  Outcome: Progressing  Flowsheets (Taken 7/14/2024 2000)  Discharge to home or other facility with appropriate resources:   Arrange for needed discharge resources and transportation as appropriate   Identify barriers to discharge with patient and caregiver   Identify discharge learning needs (meds, wound care, etc)     Problem: Pain  Goal: Verbalizes/displays adequate comfort level or baseline comfort level  Outcome: Progressing  Flowsheets (Taken 7/14/2024 0800   Verbalizes/displays adequate comfort level or baseline comfort level:   Encourage patient to monitor pain and request assistance   Assess pain using appropriate pain scale   Administer analgesics based on type and severity of pain and evaluate response   Implement non-pharmacological measures as appropriate and evaluate response   Consider cultural and social influences on pain and pain management   Notify Licensed Independent Practitioner if interventions unsuccessful or patient reports new pain     Problem: Safety - Adult  Goal: Free from fall injury  Outcome: Progressing  Flowsheets (Taken 7/14/2024 0932  Free From Fall Injury: Instruct family/caregiver on patient safety     Problem: ABCDS Injury Assessment  Goal: Absence of physical injury  Outcome: Progressing  Flowsheets (Taken 7/14/2024 0932   Absence of Physical Injury: Implement safety measures based on patient assessment     Problem: Chronic Conditions and Co-morbidities  Goal: Patient's chronic conditions and co-morbidity symptoms are monitored and maintained or improved  Outcome: Progressing  Flowsheets (Taken 7/14/2024 2000)  Care Plan - Patient's Chronic Conditions and Co-Morbidity Symptoms are Monitored and Maintained or Improved:   Monitor and assess patient's chronic conditions and comorbid symptoms for stability, deterioration, or improvement   Collaborate with multidisciplinary team to  level     Problem: Hematologic - Adult  Goal: Maintains hematologic stability  Outcome: Progressing  Flowsheets (Taken 7/14/2024 2000)  Maintains hematologic stability:   Assess for signs and symptoms of bleeding or hemorrhage   Monitor labs for bleeding or clotting disorders   Administer blood products/factors as ordered     Problem: Skin/Tissue Integrity  Goal: Absence of new skin breakdown  Description: 1.  Monitor for areas of redness and/or skin breakdown  2.  Assess vascular access sites hourly  3.  Every 4-6 hours minimum:  Change oxygen saturation probe site  4.  Every 4-6 hours:  If on nasal continuous positive airway pressure, respiratory therapy assess nares and determine need for appliance change or resting period.  Outcome: Progressing

## 2024-07-15 NOTE — PROGRESS NOTES
[facility]  INPATIENT OCCUPATIONAL THERAPY  [facility] ICU 4D  EVALUATION    Time:   Time In: 1113  Time Out: 1138  Timed Code Treatment Minutes: 15 Minutes  Minutes: 25          Date: 7/15/2024  Patient Name: Malik Ramirez,   Gender: male      MRN: [id number]  : 1960  (63 y.o.)  Referring Practitioner: Geri Pittman, JIA - CNP  Diagnosis: Traumatic intraparenchymal hemorrhage  Additional Pertinent Hx: per MD:63 M with PMHx A-fib on Eliquis, CAD on Plavix, alcohol abuse, CHF transferred from [facility] after sustaining a witnessed fall at home, positive head strike and loss of consciousness.  Patient notes he was drinking alcohol at the time.  Multiple areas of ecchymosis across bilateral arms.  CT at [place] showed subdural, subarachnoid hemorrhages.  Repeat CT at [facility] showed increased left temporal parenchymal hemorrhage with associated edema, mass effect, and local sulcal effacement, without midline shift. pt c/o HA.    Restrictions/Precautions:  Restrictions/Precautions: Fall Risk    Subjective  Chart Reviewed: Yes, Imaging, Orders, Progress Notes, History and Physical  Patient assessed for rehabilitation services?: Yes  Family / Caregiver Present: No    Subjective: RN approved OT session, patient agreeable and pleasant, IV site bloody and RN called into room to assist.    Pain: denied/10: states soreness in his right ribs from gaitbelt earlier this date     Vitals: Blood Pressure: 124/76  Oxygen: 88% with activity, 91-93% at rest on room air  Heart Rate: 58    Social/Functional History:  Lives With: Son  Type of Home: House  Home Layout: One level  Home Access: Stairs to enter without rails  Entrance Stairs - Number of Steps: 2  Home Equipment: None   Bathroom Shower/Tub: Tub/Shower unit  Bathroom Toilet: Standard  Bathroom Equipment: Grab bars in shower  Bathroom Accessibility: Accessible       ADL Assistance: Independent  Homemaking Assistance: Independent  Homemaking Responsibilities:  Patient/Caregiver education & training, Equipment evaluation, education, & procurement, Self-Care / ADL.  See long-term goal time frame for expected duration of plan of care.  If no long-term goals established, a short length of stay is anticipated.    Goals:  Patient goals : go back home with spouse  Short Term Goals  Time Frame for Short Term Goals: until discharge  Short Term Goal 1: Patient will safely complete BADL routine with supervision to improve ease and safety in daily routine.  Short Term Goal 2: Patient will complete simple I/ADL engagement with supervision and 0-1 vcs for energy conservation and safety to improve ease in meal prep and laundry tasks.  Short Term Goal 3: Patient will tolerate stanidng 5-7 minutes with no UE support to complete sink side grooming task.  Short Term Goal 4: Patient will  improve functional ambulation to household distances with SBA and item transportation to improve independence in living environment  Long Term Goals  Time Frame for Long Term Goals : until discharge    AM-PAC Inpatient Daily Activity Raw Score: 21  AM-PAC Inpatient ADL T-Scale Score : 44.27    Following session, patient left in safe position with all fall risk precautions in place.

## 2024-07-15 NOTE — PROGRESS NOTES
CRITICAL CARE PROGRESS NOTE      Patient:  Malik Ramirez    Unit/Bed:4D-18/018-A  YOB: 1960  MRN: [id number]   PCP: Carlos Arellano DO  Date of Admission: 7/14/2024  Chief Complaint:-Fall    Assessment and Plan:    SDH, SAH s/p fall w/ + HS, LOC:  patient on anti-coag/anti-platelets on arrival. 1g TXA x2 in ED.   Neurosurgery consulted. Appreciated.   Repeat head CT 7/15 showed new finding of trace amounts of layering subarachnoid blood in occipital horns of the lateral ventricles. Discussed findings with neurosurgery PA believes he will need a repeat head CT tomorrow but is otherwise stable.  Sodium 135, on arrival 126. Goal to increase 6-10 w/in 24h.  Sodium rechecks q2h.  On D5W.  POCT 112. Goal 120-180.   Maintain SBP < 160.  Seizure precaution. Keppra 1 G  Regular neurochecks.  Urinating  Bowel regimen, passing bowels appropriately.  ICH associated with chronic anticoagulation:  Chronically on Plavix, Eliquis for CAD and A-fib.  Hold all blood thinners.  TEG mapping per ICU showed % inbition ADP, AA WNL.  2000 units Kcentra, 10 mg vitamin K received.  Follow Neurosurgery recommendations for further anticoagulation.  Hyponatremia: Suspected 2/2 chronic alcohol use  Na 135, on arrival 126. Goal to increase 6-10 w/in 24h.  On D5W. Will trend sodium q2h.  Baseline value from 3 years ago WNL, recent labs are from this encounter only.   Trend daily labs, neuro checks q4h.  Right scalp hematoma:  No obvious laceration.  Antibiotic ointment, dry dressings as needed.  Pain control, with ice.  Monitor labs, H/H.  Closed head injury:    Consult SLP for cognition eval and treat.  Elevated brain stimulation guidelines  Limited options  Monitor for postconcussive symptoms  Acute alcohol intoxication chronic alcohol abuse:    EtOH on arrival at [facility] 0.2.  7/14 EtOH 0.03.  Patient mentation appropriate 7/15.  Trend labs, CIWA protocol, Ativan and phenobarbital as needed.  Multivitamin and thiamine  Consult  IntraVENous 2 times per day    polyethylene glycol  17 g Oral Daily    bacitracin   Topical TID    famotidine  20 mg Oral BID    amiodarone  200 mg Oral Daily    bumetanide  1 mg Oral Daily    [Held by provider] empagliflozin  10 mg Oral Daily    [Held by provider] metoprolol succinate  100 mg Oral Daily    potassium chloride  10 mEq Oral Daily    rosuvastatin  40 mg Oral Nightly    [Held by provider] sacubitril-valsartan  1 tablet Oral BID    multivitamin  1 tablet Oral Daily    folic acid  1 mg Oral Daily    levETIRAcetam  500 mg IntraVENous Q12H    sodium chloride flush  5-40 mL IntraVENous 2 times per day    thiamine  100 mg Oral Daily     Continuous Infusions:   sodium chloride      sodium chloride      3% sodium chloride Stopped (07/14/24 4745)       PHYSICAL EXAMINATION:  T: 98.3.  P: 57. RR: 16. B/P: 125/69.  FiO2: RA. O2 Sat: 96%.  I/O: 1751 point 3/1820.  Body mass index is 19.64 kg/m².   GCS:   15  General:   Patient awake and alert at bedside. Answering questions appropriately.  HEENT:  normocephalic and atraumatic.  No scleral icterus. PERR  Neck: supple.  No Thyromegaly.  Lungs: clear to auscultation.  No retractions  Cardiac: RRR.  No JVD.  Abdomen: soft.  Nontender.  Extremities:  No clubbing, cyanosis, or edema x 4.    Vasculature: capillary refill < 3 seconds. Palpable dorsalis pedis pulses.  Skin:  warm and dry.  Psych:  Alert and oriented x3.  Affect appropriate  Lymph:  No supraclavicular adenopathy.  Neurologic:  No focal deficit. No seizures.    Data: (All radiographs, tracings, PFTs, and imaging are personally viewed and interpreted unless otherwise noted).   Sodium 134 (139), potassium 4.0, chloride 101, bicarb 23, BUN 9, creatinine 0.7, iCal 1.03, phosphorus 2.9, magnesium 2.1.  WBC 8.9, hemoglobin 11.6, hematocrit 34.0, platelets 172.  Telemetry shows  7/14 TTE: Pending.  7/15 CT head w/o con: Trace amount of layering subarachnoid blood in the occipital horns of lateral ventricles, new

## 2024-07-15 NOTE — PROGRESS NOTES
Addendum by Dr. Noé Browne MD:  I have seen and examined the patient independently. Face to face evaluation and examination were performed.   The below evaluation and note have been reviewed. Labs and radiographs were reviewed.   I Have discussed with Neurosurgery PA/ NP about this patient in detail.  Time spent with patient 35 minutes.  Time could have been discontiguous. Time does not include procedures.  Time does include my direct assessment of the patient and coordination of care.  100% decision making by myself. Time represents more than 50% of the time involved with patient care by the neurosurgical team  I agree with below assessment and plan.  Please see my modifications mentioned below.      My additional comments and modifications:     There is no acute event over the night.  Patient is sleepy but this less agitated but otherwise there is no significant change in patient's symptoms and neurological exam compared with yesterday.  Today brain CT showed almost a stable exam but small amount of intraventricular hemorrhage.  Medical management per patient primary.  New brain CT tomorrow.  PT and OT as tolerated.  Seizure precaution.  Lovenox is okay for DVT prophylaxis from tomorrow.  The case was discussed in detail with the patient, his family and with the ICU team.  Neurosurgery will follow.    Noé Browne MD       Neurosurgery Progress Note     Date: 7/15/2024   Patient:  Malik Ramirez  YOB: 1960  Age: 63 y.o.  MRN: [id number]       Chief Complaint:   Chief Complaint   Patient presents with    Trauma Transfer    Fall     Subjective     HPI -  Per neurosurgery consult note: Malik Ramirez is a 63 y.o. male, admitted on :7/14/2024  1:53 AM  This is a 63-year-old male who presented to the ER via squad after he had a fall off steps and hit the posterior part of the head.  There is no history of loss of consciousness. There is no history of new focal deficit.  Patient is on the Plavix.  Patient  hydrocephalus or herniation.   5. Additional findings are detailed above.      This document has been electronically signed by: Smooth Watkins M.D. on    07/15/2024 05:47 AM      All CTs at this facility use dose modulation techniques and iterative    reconstructions, and/or weight-based dosing   when appropriate to reduce radiation to a low as reasonably achievable.      CT HEAD WO CONTRAST   Final Result   Increased left temporal parenchymal hemorrhage with associated edema, mass    effect, and local sulcal effacement.   Slightly decreased right parietal extra-axial hemorrhage.   Nonemergent/incidental findings in the report.      This document has been electronically signed by: Blayne Downs MD on    07/14/2024 04:13 AM      All CTs at this facility use dose modulation techniques and iterative    reconstructions, and/or weight-based dosing   when appropriate to reduce radiation to a low as reasonably achievable.      XR CHEST PORTABLE   Final Result      No acute disease.      This document has been electronically signed by: Gray Clarke MD on    07/14/2024 03:06 AM      XR PELVIS (1-2 VIEWS)   Final Result      No acute osseous abnormality.      This document has been electronically signed by: Gray Clarke MD on    07/14/2024 03:12 AM      CT INTERPRETATION OF OUTSIDE IMAGES   Final Result      8 x 4 mm small acute hematoma in the lateral periphery of the right    parietal lobe, probably extra-axial.      Small acute subdural and subarachnoid hemorrhage in the anterior inferior    left temporal region.      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      Small old infarct of the right frontal lobe.      Atrophy and chronic microvascular ischemia.      Soft tissue swelling and moderate-sized scalp hematoma with gas lucencies    overlying the right lateral calvarium. No fracture is seen.      This document has been electronically signed by: Gray Clarke MD on    07/14/2024 02:59 AM      All CTs at this  case was discussed with Dr. Browne and he is in agreement with the assessment and plan.    Electronically signed by China Montanez PA-C on 7/15/2024 at 5:42 PM

## 2024-07-16 ENCOUNTER — APPOINTMENT (OUTPATIENT)
Dept: CT IMAGING | Age: 64
End: 2024-07-16
Payer: MEDICARE

## 2024-07-16 PROBLEM — I62.9 INTRACRANIAL HEMORRHAGE (HCC): Status: ACTIVE | Noted: 2024-07-14

## 2024-07-16 LAB
ANION GAP SERPL CALC-SCNC: 12 MEQ/L (ref 8–16)
BASOPHILS ABSOLUTE: 0.1 THOU/MM3 (ref 0–0.1)
BASOPHILS NFR BLD AUTO: 1.2 %
BUN SERPL-MCNC: 6 MG/DL (ref 7–22)
CALCIUM SERPL-MCNC: 7.7 MG/DL (ref 8.5–10.5)
CHLORIDE SERPL-SCNC: 108 MEQ/L (ref 98–111)
CO2 SERPL-SCNC: 21 MEQ/L (ref 23–33)
CREAT SERPL-MCNC: 0.7 MG/DL (ref 0.4–1.2)
DEPRECATED RDW RBC AUTO: 47.5 FL (ref 35–45)
EOSINOPHIL NFR BLD AUTO: 0.6 %
EOSINOPHILS ABSOLUTE: 0 THOU/MM3 (ref 0–0.4)
ERYTHROCYTE [DISTWIDTH] IN BLOOD BY AUTOMATED COUNT: 12.6 % (ref 11.5–14.5)
GFR SERPL CREATININE-BSD FRML MDRD: > 90 ML/MIN/1.73M2
GLUCOSE SERPL-MCNC: 98 MG/DL (ref 70–108)
HCT VFR BLD AUTO: 32.2 % (ref 42–52)
HGB BLD-MCNC: 10.6 GM/DL (ref 14–18)
IMM GRANULOCYTES # BLD AUTO: 0.26 THOU/MM3 (ref 0–0.07)
IMM GRANULOCYTES NFR BLD AUTO: 3.8 %
LYMPHOCYTES ABSOLUTE: 0.9 THOU/MM3 (ref 1–4.8)
LYMPHOCYTES NFR BLD AUTO: 13.4 %
MCH RBC QN AUTO: 34.3 PG (ref 26–33)
MCHC RBC AUTO-ENTMCNC: 32.9 GM/DL (ref 32.2–35.5)
MCV RBC AUTO: 104.2 FL (ref 80–94)
MONOCYTES ABSOLUTE: 1 THOU/MM3 (ref 0.4–1.3)
MONOCYTES NFR BLD AUTO: 15.1 %
NEUTROPHILS ABSOLUTE: 4.5 THOU/MM3 (ref 1.8–7.7)
NEUTROPHILS NFR BLD AUTO: 65.9 %
NRBC BLD AUTO-RTO: 0 /100 WBC
PLATELET # BLD AUTO: 156 THOU/MM3 (ref 130–400)
PMV BLD AUTO: 9.6 FL (ref 9.4–12.4)
POTASSIUM SERPL-SCNC: 3.8 MEQ/L (ref 3.5–5.2)
RBC # BLD AUTO: 3.09 MILL/MM3 (ref 4.7–6.1)
SODIUM SERPL-SCNC: 140 MEQ/L (ref 135–145)
SODIUM SERPL-SCNC: 140 MEQ/L (ref 135–145)
SODIUM SERPL-SCNC: 141 MEQ/L (ref 135–145)
SODIUM SERPL-SCNC: 141 MEQ/L (ref 135–145)
WBC # BLD AUTO: 6.8 THOU/MM3 (ref 4.8–10.8)

## 2024-07-16 PROCEDURE — 97110 THERAPEUTIC EXERCISES: CPT

## 2024-07-16 PROCEDURE — 36415 COLL VENOUS BLD VENIPUNCTURE: CPT

## 2024-07-16 PROCEDURE — 99232 SBSQ HOSP IP/OBS MODERATE 35: CPT | Performed by: NEUROLOGICAL SURGERY

## 2024-07-16 PROCEDURE — 6370000000 HC RX 637 (ALT 250 FOR IP)

## 2024-07-16 PROCEDURE — 2580000003 HC RX 258

## 2024-07-16 PROCEDURE — 6370000000 HC RX 637 (ALT 250 FOR IP): Performed by: PHYSICIAN ASSISTANT

## 2024-07-16 PROCEDURE — 2060000000 HC ICU INTERMEDIATE R&B

## 2024-07-16 PROCEDURE — APPSS30 APP SPLIT SHARED TIME 16-30 MINUTES: Performed by: PHYSICIAN ASSISTANT

## 2024-07-16 PROCEDURE — 51798 US URINE CAPACITY MEASURE: CPT

## 2024-07-16 PROCEDURE — 97116 GAIT TRAINING THERAPY: CPT

## 2024-07-16 PROCEDURE — 85025 COMPLETE CBC W/AUTO DIFF WBC: CPT

## 2024-07-16 PROCEDURE — 97129 THER IVNTJ 1ST 15 MIN: CPT

## 2024-07-16 PROCEDURE — 97130 THER IVNTJ EA ADDL 15 MIN: CPT

## 2024-07-16 PROCEDURE — 99291 CRITICAL CARE FIRST HOUR: CPT | Performed by: INTERNAL MEDICINE

## 2024-07-16 PROCEDURE — 2580000003 HC RX 258: Performed by: PHYSICIAN ASSISTANT

## 2024-07-16 PROCEDURE — 97530 THERAPEUTIC ACTIVITIES: CPT

## 2024-07-16 PROCEDURE — 99232 SBSQ HOSP IP/OBS MODERATE 35: CPT | Performed by: SURGERY

## 2024-07-16 PROCEDURE — 84295 ASSAY OF SERUM SODIUM: CPT

## 2024-07-16 PROCEDURE — 6360000002 HC RX W HCPCS

## 2024-07-16 PROCEDURE — 70450 CT HEAD/BRAIN W/O DYE: CPT

## 2024-07-16 PROCEDURE — 80048 BASIC METABOLIC PNL TOTAL CA: CPT

## 2024-07-16 RX ADMIN — FAMOTIDINE 20 MG: 20 TABLET, FILM COATED ORAL at 21:06

## 2024-07-16 RX ADMIN — BUMETANIDE 1 MG: 1 TABLET ORAL at 08:32

## 2024-07-16 RX ADMIN — BACITRACIN: 500 OINTMENT TOPICAL at 21:07

## 2024-07-16 RX ADMIN — ROSUVASTATIN CALCIUM 40 MG: 20 TABLET, FILM COATED ORAL at 21:06

## 2024-07-16 RX ADMIN — LEVETIRACETAM 500 MG: 100 INJECTION INTRAVENOUS at 06:09

## 2024-07-16 RX ADMIN — ACETAMINOPHEN 650 MG: 325 TABLET ORAL at 21:06

## 2024-07-16 RX ADMIN — POLYETHYLENE GLYCOL (3350) 17 G: 17 POWDER, FOR SOLUTION ORAL at 09:21

## 2024-07-16 RX ADMIN — Medication 100 MG: at 08:31

## 2024-07-16 RX ADMIN — BACITRACIN: 500 OINTMENT TOPICAL at 14:00

## 2024-07-16 RX ADMIN — FOLIC ACID 1 MG: 1 TABLET ORAL at 08:32

## 2024-07-16 RX ADMIN — AMIODARONE HYDROCHLORIDE 200 MG: 200 TABLET ORAL at 08:32

## 2024-07-16 RX ADMIN — Medication 1 TABLET: at 08:31

## 2024-07-16 RX ADMIN — SODIUM CHLORIDE 50 ML/HR: 3 INJECTION, SOLUTION INTRAVENOUS at 06:16

## 2024-07-16 RX ADMIN — FAMOTIDINE 20 MG: 20 TABLET, FILM COATED ORAL at 08:32

## 2024-07-16 RX ADMIN — SODIUM CHLORIDE, PRESERVATIVE FREE 10 ML: 5 INJECTION INTRAVENOUS at 21:21

## 2024-07-16 RX ADMIN — Medication 6 MG: at 21:06

## 2024-07-16 RX ADMIN — ACETAMINOPHEN 650 MG: 325 TABLET ORAL at 03:24

## 2024-07-16 RX ADMIN — ACETAMINOPHEN 650 MG: 325 TABLET ORAL at 12:56

## 2024-07-16 RX ADMIN — LEVETIRACETAM 500 MG: 100 INJECTION INTRAVENOUS at 18:16

## 2024-07-16 RX ADMIN — POTASSIUM CHLORIDE 10 MEQ: 750 TABLET, FILM COATED, EXTENDED RELEASE ORAL at 08:32

## 2024-07-16 RX ADMIN — NICOTINE POLACRILEX 2 MG: 2 LOZENGE ORAL at 08:33

## 2024-07-16 RX ADMIN — BACITRACIN: 500 OINTMENT TOPICAL at 08:32

## 2024-07-16 ASSESSMENT — PAIN DESCRIPTION - ORIENTATION
ORIENTATION: OUTER
ORIENTATION: OUTER;OTHER (COMMENT)

## 2024-07-16 ASSESSMENT — PAIN DESCRIPTION - DESCRIPTORS
DESCRIPTORS: ACHING;DULL
DESCRIPTORS: ACHING
DESCRIPTORS: ACHING;DISCOMFORT

## 2024-07-16 ASSESSMENT — PAIN DESCRIPTION - LOCATION
LOCATION: HEAD

## 2024-07-16 ASSESSMENT — PAIN DESCRIPTION - FREQUENCY
FREQUENCY: INTERMITTENT
FREQUENCY: INTERMITTENT

## 2024-07-16 ASSESSMENT — PAIN DESCRIPTION - PAIN TYPE
TYPE: ACUTE PAIN
TYPE: ACUTE PAIN

## 2024-07-16 ASSESSMENT — PAIN SCALES - GENERAL
PAINLEVEL_OUTOF10: 1
PAINLEVEL_OUTOF10: 3
PAINLEVEL_OUTOF10: 3
PAINLEVEL_OUTOF10: 0
PAINLEVEL_OUTOF10: 3

## 2024-07-16 ASSESSMENT — PAIN DESCRIPTION - ONSET
ONSET: GRADUAL
ONSET: GRADUAL

## 2024-07-16 NOTE — PROGRESS NOTES
Pt was in bed and alone at the time of the visit. He was dealing with traumatic intraparenchymal hemorrhage. He was encouraged and blessed.    07/16/24 1507   Encounter Summary   Encounter Overview/Reason Initial Encounter   Service Provided For Patient   Referral/Consult From [facility]   Support System Family members   Last Encounter  07/16/24   Complexity of Encounter Low   Begin Time 1430   End Time  1435   Total Time Calculated 5 min   Spiritual/Emotional needs   Type Spiritual Support   Assessment/Intervention/Outcome   Assessment Hopeful   Intervention Empowerment

## 2024-07-16 NOTE — PROGRESS NOTES
[facility]  INPATIENT SPEECH THERAPY  [facility] ICU 4D  DAILY NOTE    TIME   SLP Individual Minutes  Time In: 1040  Time Out: 1103  Minutes: 23  Timed Code Treatment Minutes: 23 Minutes       Date: 2024  Patient Name: Malik Ramirez      CSN: [id number]   : 1960  (63 y.o.)  Gender: male   Referring Physician: Anita Ritter PA-C   Diagnosis: Trauma   Precautions: Fall risk  Current Diet: Regular, thin liquids  Respiratory Status: Room Air  Swallowing Strategies: Standard Universal Swallow Precautions  Date of Last MBS/FEES: Not Applicable    Pain:  No pain reported.    Subjective:  DIOR Lan with approval to proceed with interventions. Upon arrival, patient resting in bed, alert and pleasant. Improvements with cognitive awareness/performance at date.     Short-Term Goals:  SHORT TERM GOAL #1:  Goal 1: Patient will complete functional carryover tasks (O-log, biographics, call light) wtih achievement of PTA clearance (>25/30 x2-3 consecutive dates) to ascertain readiness to resume new learning skills.  INTERVENTIONS: O-lo/30 (prior score of 23/30)  *significant gains at date for overall temporal awareness     SHORT TERM GOAL #2:  Goal 2: Patient will complete problem solving, verbal/visual reasoning, and executive functioning tasks (daily living requirements) with 70% accuracy, mod cues to assist with successful IADL completion.  INTERVENTIONS: Problem solving  -Call light justifications: 3/3 independent  -Home scenarios: 5/5 independent, independent verbalization for emergency phone number 911    Reasoning  -Medication management: prior to admission, patient reporting x9 AM medications, x2 PM medications with methodology of turning pill boxes over to indicate consumption; previous ownership of x1 medication list, encouragement provided to obtain novel lists post discharge from this facility with copies given to pertinent family members to optimize health and safety measures;

## 2024-07-16 NOTE — PLAN OF CARE
Problem: Discharge Planning  Goal: Discharge to home or other facility with appropriate resources  Outcome: Progressing  Flowsheets (Taken 7/15/2024 1935)  Discharge to home or other facility with appropriate resources:   Identify barriers to discharge with patient and caregiver   Arrange for needed discharge resources and transportation as appropriate   Identify discharge learning needs (meds, wound care, etc)     Problem: Pain  Goal: Verbalizes/displays adequate comfort level or baseline comfort level  Outcome: Progressing  Flowsheets (Taken 7/15/2024 1935)  Verbalizes/displays adequate comfort level or baseline comfort level:   Encourage patient to monitor pain and request assistance   Assess pain using appropriate pain scale   Implement non-pharmacological measures as appropriate and evaluate response   Administer analgesics based on type and severity of pain and evaluate response     Problem: Safety - Adult  Goal: Free from fall injury  Outcome: Progressing  Free From Fall Injury: Instruct family/caregiver on patient safety     Problem: Chronic Conditions and Co-morbidities  Goal: Patient's chronic conditions and co-morbidity symptoms are monitored and maintained or improved  Outcome: Progressing  Flowsheets (Taken 7/15/2024 1935)  Care Plan - Patient's Chronic Conditions and Co-Morbidity Symptoms are Monitored and Maintained or Improved:   Monitor and assess patient's chronic conditions and comorbid symptoms for stability, deterioration, or improvement   Collaborate with multidisciplinary team to address chronic and comorbid conditions and prevent exacerbation or deterioration   Update acute care plan with appropriate goals if chronic or comorbid symptoms are exacerbated and prevent overall improvement and discharge     Problem: Skin/Tissue Integrity - Adult  Goal: Skin integrity remains intact  Outcome: Progressing  Flowsheets (Taken 7/15/2024 1935)  Skin Integrity Remains Intact: Assess vascular access

## 2024-07-16 NOTE — PROGRESS NOTES
[facility]  INPATIENT PHYSICAL THERAPY  DAILY NOTE  [facility] ICU 4D - 4D-18/018-A    Time In: 1343  Time Out: 1407  Timed Code Treatment Minutes: 27 Minutes  Minutes: 24          Date: 2024  Patient Name: Malik Ramirez,  Gender:  male        MRN: [id number]  : 1960  (63 y.o.)     Referring Practitioner: LEONARDO Jensen  Diagnosis: trauma  Additional Pertinent Hx: per MD:63 M with PMHx A-fib on Eliquis, CAD on Plavix, alcohol abuse, CHF transferred from [facility] after sustaining a witnessed fall at home, positive head strike and loss of consciousness.  Patient notes he was drinking alcohol at the time.  Multiple areas of ecchymosis across bilateral arms.  CT at [facility] showed subdural, subarachnoid hemorrhages.  Repeat CT at [facility] showed increased left temporal parenchymal hemorrhage with associated edema, mass effect, and local sulcal effacement, without midline shift. pt c/o HA.     Prior Level of Function:  Lives With: Son  Type of Home: House  Home Layout: One level  Home Access: Stairs to enter without rails  Entrance Stairs - Number of Steps: 2  Home Equipment: None   Bathroom Shower/Tub: Tub/Shower unit  Bathroom Toilet: Standard  Bathroom Equipment: Grab bars in shower  Bathroom Accessibility: Accessible    ADL Assistance: Independent  Homemaking Assistance: Independent  Homemaking Responsibilities: Yes  Ambulation Assistance: Independent  Transfer Assistance: Independent  Active : Yes  Additional Comments: per pt hx of 2 falls when he gets up too quick    Restrictions/Precautions:  Restrictions/Precautions: Fall Risk     SUBJECTIVE: RN and patient are agreeable for PT today. Patient is A&O x 4. RN states that patient has orders to be moved to a step down unit    PAIN: Rib pain with blowing nose and mobility, does not rate on scale    Vitals: Blood Pressure: 151/78  Oxygen: 91-97% on room air  Heart Rate: 59-62    OBJECTIVE:  Bed Mobility:  Supine to Sit: Contact Guard  Assistance  Sit to Supine: Contact Guard Assistance     Transfers:  Sit to Stand: Contact Guard Assistance, Minimal Assistance  Stand to Sit:Contact Guard Assistance    Ambulation:  Minimal Assistance, Moderate Assistance  Distance: 30 ft  Surface: Level Tile  Device: Rolling Walker  Gait Deviations:  Forward Flexed Posture, Slow Nini, Decreased Step Length Bilaterally, Lean to Right, Lean to Left, Decreased Trunk Rotation, Decreased Heel Strike Bilaterally, Decreased Foot Clearance Right, Decreased Foot Clearance Left, Unsteady Gait, and Increased reliance on assistive device    Balance:  Static Sitting Balance:  Supervision  Dynamic Sitting Balance: Stand By Assistance  Static Standing Balance: Contact Guard Assistance    Exercise:  Patient was guided in 1 set(s) 10 reps of exercise to both lower extremities.  Ankle pumps, Hip abduction/adduction, Seated marches, and Long arc quads.  Exercises were completed for increased independence with functional mobility.    Functional Outcome Measures:  [facility] (6 CLICK) BASIC MOBILITY  AM-PAC Inpatient Mobility Raw Score : 18  AM-PAC Inpatient T-Scale Score : 43.63  Mobility Inpatient CMS 0-100% Score: 46.58  Mobility Inpatient CMS G-Code Modifier : CK        Modified [place] Scale:  +4 - Moderately severe disability; unable to walk and attend to bodily needs without assistance.   Patient could not live alone and could not walk from one room to another without physical help from another person, but they can sit up in bed without any help.  Education provided regarding stroke rehabilitation management.    ASSESSMENT:  Assessment: Patient progressing toward established goals.  Activity Tolerance:  Patient tolerance of  treatment: good. \"João\" had good tolerance with PT today. He demos fatigue with ambulation and exercises. He also needs minimal-moderate assistance with ambulation as he has moderate increase in lean to ea directions and poor contact of RW to floor. Continued  PT would benefit the patient to address current deficits. Recommend continued assessment    Equipment Recommendations:Equipment Needed: No  Discharge Recommendations: Continue to assess pending progress and Patient would benefit from continued PT at discharge  Plan: Current Treatment Recommendations: Strengthening, Balance training, Functional mobility training, Transfer training, Endurance training, Home exercise program, Stair training, Gait training, Safety education & training, Patient/Caregiver education & training, Therapeutic activities  General Plan:  (5X N)    Education:  Learners: Patient  Patient Education: Plan of Care, Precautions/Restrictions, Education Related to Diagnosis, Bed Mobility, Transfers, Gait, Use of Gait Belt, Verbal Exercise Instruction, Home Safety Education, Activity Pacing,  - Patient Verbalized Understanding    Goals:  Patient Goals : go home  Short Term Goals  Time Frame for Short Term Goals: by discharge  Short Term Goal 1: bed mobility with I to get in/out of bed  Short Term Goal 2: transfer with I to get in/out of chairs  Short Term Goal 3: amb >150'x1 without AD and I to walk safely in home  Short Term Goal 4: negotiate 2 steps without HR and I to enter home safely  Long Term Goals  Time Frame for Long Term Goals : no LTGs set secondary to short ELOS    Following session, patient left in safe position with all fall risk precautions in place.

## 2024-07-16 NOTE — PROGRESS NOTES
Addendum by Dr. Noé Browne MD:  I have seen and examined the patient independently. Face to face evaluation and examination were performed.   The below evaluation and note have been reviewed. Labs and radiographs were reviewed.   I Have discussed with Neurosurgery PA/ NP about this patient in detail.  Time spent with patient 35 minutes.  Time could have been discontiguous. Time does not include procedures.  Time does include my direct assessment of the patient and coordination of care.  100% decision making by myself. Time represents more than 50% of the time involved with patient care by the neurosurgical team  I agree with below assessment and plan.  Please see my modifications mentioned below.      My additional comments and modifications:      There is no acute event over the night.  More awake, interactive and alert and oriented today  Today brain CT showed stable exam  Medical management per patient primary.  There is no indication for any acute neurosurgical intervention at this time  New brain CT after 1 week and follow-up the result with neurosurgery outpatient clinic  PT and OT as tolerated.  Seizure precaution (Keppra 500 mg twice daily for 1 week)  Lovenox is okay for DVT prophylaxis from tomorrow.  The case was discussed in detail with the patient and with the ICU team.  Patient can be discharged from neurosurgical perspective once he is cleared by other medical services.  From this time on, neurosurgery team will see this patient only as needed as long as he is in the hospital. Please call if you have any further questions or concerns regarding this patient.       Noé Browne MD       Neurosurgery Progress Note    Patient:  Malik Ramirez      Unit/Bed:4D-18/018-A    YOB: 1960    MRN: [id number]     Acct: [id number]     Admit date: 7/14/2024    Chief Complaint   Patient presents with    Trauma Transfer    Fall       Patient Seen, Chart, Physician notes, Labs, Radiology studies  the cervical lordosis which may be secondary to muscle spasm or positioning. Multilevel degenerative changes as described above. This document has been electronically signed by: Gray Clarke MD on 07/14/2024 03:05 AM All CTs at this facility use dose modulation techniques and iterative reconstructions, and/or weight-based dosing when appropriate to reduce radiation to a low as reasonably achievable.    CT INTERPRETATION OF OUTSIDE IMAGES    Result Date: 7/14/2024  hemia. 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 There is a small old right frontal lobe infarct. There is a small acute hematoma in the lateral periphery of the right parietal lobe measuring 8 x 4 mm (series 2, image 62). There is small acute subdural and subarachnoid hemorrhage in the anterior inferior left temporal region. There is no hydrocephalus or midline shift. The visualized paranasal sinuses and mastoid air cells are clear. There is soft tissue swelling and moderate size scalp hematoma with gas lucencies overlying the right lateral calvarium. No fracture is seen.     8 x 4 mm small acute hematoma in the lateral periphery of the right parietal lobe, probably extra-axial. Small acute subdural and subarachnoid hemorrhage in the anterior inferior left temporal region. 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 Small old infarct of the right frontal lobe. Atrophy and chronic microvascular ischemia. Soft tissue swelling and moderate-sized scalp hematoma with gas lucencies overlying the right lateral calvarium. No fracture is seen. This document has been electronically signed by: Gray Clarke MD on 07/14/2024 02:59 AM All CTs at this facility use dose modulation techniques and iterative reconstructions, and/or weight-based dosing when appropriate to reduce radiation to a low as reasonably achievable.                   Assessment: Temporal parenchymal hemorrhage with trace subarachnoid component    Principal

## 2024-07-16 NOTE — PROGRESS NOTES
[facility]  Trauma Surgery - Dr. Angel Wilkins  Daily Progress Note    Pt Name: Malik Ramirez  Medical Record Number: [id number]  Date of Birth 1960   Today's Date: 7/16/2024    HD: # 2    CC: No complaints    ASSESSMENT  1.  Active Hospital Problems    Diagnosis Date Noted    Permanent atrial fibrillation (HCC) [I48.21]      Priority: High    Traumatic intraventricular hemorrhage (HCC) [S06.30AA] 07/15/2024    SDH (subdural hematoma) (HCC) [S06.5XAA] 07/15/2024    Falls, initial encounter [W19.XXXA] 07/15/2024    SAH (subarachnoid hemorrhage) (HCC) [I60.9] 07/15/2024    Subdural hematoma (HCC) [S06.5XAA] 07/14/2024    Subarachnoid hemorrhage (HCC) [I60.9] 07/14/2024    Fall [W19.XXXA] 07/14/2024    Scalp hematoma, initial encounter [S00.03XA] 07/14/2024    Anticoagulated [Z79.01] 07/14/2024    Antiplatelet or antithrombotic long-term use [Z79.02] 07/14/2024    Trauma [T14.90XA] 07/14/2024    Intracranial hemorrhage (HCC) [I62.9] 07/14/2024    Chronic combined systolic and diastolic CHF (congestive heart failure) (HCC) [I50.42] 12/09/2021    Ischemic cardiomyopathy [I25.5]          PLAN  Patient admitted under Trauma Services (w/ tele) to the ICU    - 07/16:  Okay to transfer to stepdown; transfer orders placed      Trauma by fall   - Fall precautions   - PT OT eval and treat as able    Subarachnoid, subdural, intraparenchymal hemorrhage   - CT head at [facility] reads Small degree of subarachnoid hemorrhage seen along the left temporal lobe inferiorly, with some possible hemorrhagic cortical contusion or subdural hemorrhage seen along the floor of the middle cranial fossa along the   inferior aspect of the left temporal lobe. Small degree of suspected subdural hemorrhage noted along the falx as well as asymmetrically along the left tentorial leaflet.  This measures up to a couple mm in greatest thickness.    - CT interpretation here reads 8 x 4 mm small acute hematoma in the lateral periphery of  lobe infarct. There is a small acute hematoma in the lateral periphery of the right parietal lobe measuring 8 x 4 mm (series 2, image 62). There is small acute subdural and subarachnoid hemorrhage in the anterior inferior left temporal region. There is no hydrocephalus or midline shift. The visualized paranasal sinuses and mastoid air cells are clear. There is soft tissue swelling and moderate size scalp hematoma with gas lucencies overlying the right lateral calvarium. No fracture is seen.     8 x 4 mm small acute hematoma in the lateral periphery of the right parietal lobe, probably extra-axial. Small acute subdural and subarachnoid hemorrhage in the anterior inferior left temporal region. 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 Small old infarct of the right frontal lobe. Atrophy and chronic microvascular ischemia. Soft tissue swelling and moderate-sized scalp hematoma with gas lucencies overlying the right lateral calvarium. No fracture is seen. This document has been electronically signed by: Gray Clarke MD on 07/14/2024 02:59 AM All CTs at this facility use dose modulation techniques and iterative reconstructions, and/or weight-based dosing when appropriate to reduce radiation to a low as reasonably achievable.    CT SPINE CERVICAL WITHOUT CONTRAST    Result Date: 7/14/2024  EXAMINATION: CT OF THE HEAD WITHOUT CONTRAST; CT OF THE CERVICAL SPINE WITHOUT CONTRAST 7/13/2024 11:25 pm; 7/13/2024 11:26 pm TECHNIQUE: CT of the head was performed without the administration of intravenous contrast. Automated exposure control, iterative reconstruction, and/or weight based adjustment of the mA/kV was utilized to reduce the radiation dose to as low as reasonably achievable.; CT of the cervical spine was performed without the administration of intravenous contrast. Multiplanar reformatted images are provided for review. Automated exposure control, iterative reconstruction, and/or weight based adjustment of  patient care collectively between subjective/objective examination, chart review, documentation, clinical reasoning and discussion with attending regarding plan/interval changes.    Electronically signed by JIA Epps CNP on 7/16/2024 at 9:25 AM      Patient seen and examined independently by me 7/16/2024  Neuro remains intact  Sodium 141 will stop 3% normal saline  Since all 3% will transfer out to the floor  Continue current plans and therapies otherwise  No signs or symptoms of alcohol withdrawal  Therapies continue and are recommending home with home services   I personally supervised the PA/NP in the evaluation, management and development of the treatment plan for Malik Ramirez  on the same date of service as above.      I personally interviewed Malik Ramirez   and  discussed his review of symptoms as able due to the patient's condition, as well as performed an individual physical exam on the same   date of service as above.  In addition I discussed the patient's condition and treatment options with the patient, if able, and/or designated family if available.      I have also reviewed and agree with the past medical,  family and social history updates as well as care plans unless otherwise noted below.  All questions were answered.      I examined independently and reviewed relevant data myself and may have done so in the context of team rounds.  A full chart review was performed by me.       I attest that this medical record entry accurately reflects signatures and notations that I made in my capacity as an M.D. when I treated and diagnosed Malik Ramirez on the date of service above     I was responsible for all medical decision making involving this encounter.      I identified and/or confirmed all problems associated with this patient encounter by my own direct physical examination of this patient and review of all radiology studies and labwork  that were ordered and

## 2024-07-16 NOTE — PROGRESS NOTES
[facility]  [facility] ICU 4D  Occupational Therapy  Daily Note  Time:   Time In: 1111  Time Out: 1129  Timed Code Treatment Minutes: 18 Minutes  Minutes: 18          Date: 2024  Patient Name: Malik Ramirez,   Gender: male      Room: -/018-A  MRN: [id number]  : 1960  (63 y.o.)  Referring Practitioner: Geri Pittman APRN - CNP  Diagnosis: Traumatic intraparenchymal hemorrhage  Additional Pertinent Hx: per MD:63 M with PMHx A-fib on Eliquis, CAD on Plavix, alcohol abuse, CHF transferred from [facility] after sustaining a witnessed fall at home, positive head strike and loss of consciousness.  Patient notes he was drinking alcohol at the time.  Multiple areas of ecchymosis across bilateral arms.  CT at [place] showed subdural, subarachnoid hemorrhages.  Repeat CT at [facility] showed increased left temporal parenchymal hemorrhage with associated edema, mass effect, and local sulcal effacement, without midline shift. pt c/o HA.    Restrictions/Precautions:  Restrictions/Precautions: Fall Risk     Social/Functional History:  Lives With: Son  Type of Home: House  Home Layout: One level  Home Access: Stairs to enter without rails  Entrance Stairs - Number of Steps: 2  Home Equipment: None   Bathroom Shower/Tub: Tub/Shower unit  Bathroom Toilet: Standard  Bathroom Equipment: Grab bars in shower  Bathroom Accessibility: Accessible       ADL Assistance: Independent  Homemaking Assistance: Independent  Homemaking Responsibilities: Yes  Ambulation Assistance: Independent  Transfer Assistance: Independent    Active : Yes     Additional Comments: per pt hx of 2 falls when he gets up too quick    SUBJECTIVE: RN approved therapy. Upon OT arrival, pt was lying in bed and agreeable to therapy.    PAIN: Pt does not report    Vitals: Vitals not assessed per clinical judgement, see nursing flowsheet    COGNITION: Slow Processing and Decreased Problem Solving    ADL:   Footwear Management: Moderate

## 2024-07-16 NOTE — PLAN OF CARE
Progressing  Flowsheets (Taken 7/15/2024 1935)  Achieves stable or improved neurological status:   Assess for and report changes in neurological status   Initiate measures to prevent increased intracranial pressure   Maintain blood pressure and fluid volume within ordered parameters to optimize cerebral perfusion and minimize risk of hemorrhage     Problem: Skin/Tissue Integrity - Adult  Goal: Skin integrity remains intact  7/16/2024 0940 by Riky Carcamo RN  Outcome: Progressing  7/15/2024 2252 by Pamela Ortiz RN  Outcome: Progressing  Flowsheets (Taken 7/15/2024 1935)  Skin Integrity Remains Intact: Assess vascular access sites hourly  Goal: Incisions, wounds, or drain sites healing without S/S of infection  7/16/2024 0940 by Riky Carcamo RN  Outcome: Progressing  7/15/2024 2252 by Pamela Ortiz RN  Outcome: Progressing  Flowsheets (Taken 7/15/2024 1935)  Incisions, Wounds, or Drain Sites Healing Without Sign and Symptoms of Infection: TWICE DAILY: Assess and document skin integrity     Problem: Hematologic - Adult  Goal: Maintains hematologic stability  7/16/2024 0940 by Riky Carcamo RN  Outcome: Progressing  7/15/2024 2252 by Pamela Ortiz RN  Outcome: Progressing  Flowsheets (Taken 7/15/2024 1935)  Maintains hematologic stability:   Assess for signs and symptoms of bleeding or hemorrhage   Monitor labs for bleeding or clotting disorders   Administer blood products/factors as ordered     Problem: Skin/Tissue Integrity  Goal: Absence of new skin breakdown  Description: 1.  Monitor for areas of redness and/or skin breakdown  2.  Assess vascular access sites hourly  3.  Every 4-6 hours minimum:  Change oxygen saturation probe site  4.  Every 4-6 hours:  If on nasal continuous positive airway pressure, respiratory therapy assess nares and determine need for appliance change or resting period.  7/16/2024 0940 by Riky Carcamo RN  Outcome: Progressing  7/15/2024 2252 by Pamela Ortiz  RN  Outcome: Progressing  Note: No signs of new skin breakdown with each assessment. Skin remains warm, dry, intact. Mucous membranes pink & moist. Patient is able to turn self.

## 2024-07-16 NOTE — PROGRESS NOTES
CRITICAL CARE PROGRESS NOTE      Patient:  Malik Ramirez    Unit/Bed:4D-18/018-A  YOB: 1960  MRN: [id number]   PCP: Erick Veronica MD  Date of Admission: 7/14/2024  Chief Complaint:-Fall    Assessment and Plan:    SDH, SAH s/p fall w/ + HS, LOC:  patient on anti-coag/anti-platelets on arrival. 1g TXA x2 in ED.   Neurosurgery consulted. Appreciated.   Head CT 7/16:  No significant interval change since the prior study. Intraparenchymal blood within the right parietal lobe and left temporal lobe does not appear to be significantly changed. Tiny amount of subarachnoid blood in the occipital horns of the lateral ventricles also does not appear to be   significantly changed.  Continues to be on 3% NaCl.   Maintain -160.  Seizure precaution. Keppra 500mg BID  ICH associated with chronic anticoagulation:  Chronically on Plavix, Eliquis for CAD and A-fib.  Hold all blood thinners.  TEG mapping per ICU showed % inbition ADP, AA WNL.  2000 units Kcentra, 10 mg vitamin K received.  Follow Neurosurgery recommendations for further anticoagulation.  Hyponatremia: Suspected 2/2 chronic alcohol use  Na stable at 140 past 6 hours. Goal 145-150.  Sodium 140, maintain at goal 145-150 per neuro.  Sodium rechecks q2h.  Baseline value from 3 years ago WNL, recent labs are from this encounter only.   Daily labs, monitor s/sx seizures.  Right scalp hematoma:  No obvious laceration. Healing.  Antibiotic ointment, dry dressings as needed.  Pain control, with ice.  Monitor labs, H/H.  Closed head injury:    Consult SLP for cognition eval and treat.  Elevated brain stimulation guidelines  Limited options  Monitor for postconcussive symptoms  Mild Hyperglycemia: resolved.  Acute alcohol intoxication chronic alcohol abuse:    EtOH on arrival at [facility] 0.2.  7/14 EtOH 0.03.  Patient mentation appropriate 7/16.  Trend labs, GMAWS protocol and phenobarbital as needed.  Folic acid, thiamine, multivitamin.  Consult addiction  10.6, hematocrit 32.2, platelets 156.  7/16 CT head w/o con:  No significant interval change since the prior study. Intraparenchymal blood within the right parietal lobe and left temporal lobe does not appear to be significantly changed. Tiny amount of subarachnoid blood in the occipital horns of the lateral ventricles also does not appear to be  7/15 CT head w/o con: Trace amount of layering subarachnoid blood in the occipital horns of lateral ventricles, new since the prior study.  There is no hydrocephalus or herniation.  Tiny amount of subdural blood in association with left aspect of tentorium cerebelli, this is stable.  7/14 CT Head w/o con:Increased left temporal parenchymal hemorrhage with associated edema, mass effect, and local sulcal effacement. Slightly decreased right parietal extra-axial hemorrhage.Nonemergent/incidental findings in the report.  7/14 TTE: Normal left ventricle systolic function, EF 55-60%.  LV normal size, normal wall thickness, normal wall motion.  Telemetry shows NSR.          Case and plan discussed with Dr. Tomlinson.    Electronically signed by Kareem Heredia DO      Patient seen by me including key components of medical care.  Case discussed with resident physician.  Plans are to continue with hypertonic saline.  Continue with neurologic assessment.  Trending serum sodium with a goal of 1 45-1 50.  Italicized bold font, if present,  represents changes to the note made by me.  CC time 35 minutes.  Time was discontiguous. Time does not include procedure. Time does include my direct assessment of the patient and coordination of care.  Time represents more than 50% of the time involved with patient care by the CC team.  Electronically signed by Jadiel Tomlinson MD.

## 2024-07-17 PROCEDURE — 6370000000 HC RX 637 (ALT 250 FOR IP): Performed by: PHYSICIAN ASSISTANT

## 2024-07-17 PROCEDURE — 97116 GAIT TRAINING THERAPY: CPT

## 2024-07-17 PROCEDURE — 2580000003 HC RX 258: Performed by: PHYSICIAN ASSISTANT

## 2024-07-17 PROCEDURE — 97535 SELF CARE MNGMENT TRAINING: CPT

## 2024-07-17 PROCEDURE — 99222 1ST HOSP IP/OBS MODERATE 55: CPT | Performed by: NURSE PRACTITIONER

## 2024-07-17 PROCEDURE — 6360000002 HC RX W HCPCS: Performed by: SURGERY

## 2024-07-17 PROCEDURE — 6360000002 HC RX W HCPCS

## 2024-07-17 PROCEDURE — 97530 THERAPEUTIC ACTIVITIES: CPT

## 2024-07-17 PROCEDURE — 6370000000 HC RX 637 (ALT 250 FOR IP)

## 2024-07-17 PROCEDURE — 2580000003 HC RX 258

## 2024-07-17 PROCEDURE — 99232 SBSQ HOSP IP/OBS MODERATE 35: CPT | Performed by: SURGERY

## 2024-07-17 PROCEDURE — 2060000000 HC ICU INTERMEDIATE R&B

## 2024-07-17 RX ORDER — ENOXAPARIN SODIUM 100 MG/ML
40 INJECTION SUBCUTANEOUS DAILY
Status: DISCONTINUED | OUTPATIENT
Start: 2024-07-17 | End: 2024-07-18 | Stop reason: HOSPADM

## 2024-07-17 RX ORDER — LEVETIRACETAM 500 MG/1
500 TABLET ORAL 2 TIMES DAILY
Status: DISCONTINUED | OUTPATIENT
Start: 2024-07-17 | End: 2024-07-18 | Stop reason: HOSPADM

## 2024-07-17 RX ADMIN — ENOXAPARIN SODIUM 40 MG: 100 INJECTION SUBCUTANEOUS at 10:16

## 2024-07-17 RX ADMIN — Medication 1 TABLET: at 10:20

## 2024-07-17 RX ADMIN — Medication 100 MG: at 10:17

## 2024-07-17 RX ADMIN — BACITRACIN: 500 OINTMENT TOPICAL at 20:51

## 2024-07-17 RX ADMIN — LEVETIRACETAM 500 MG: 500 TABLET, FILM COATED ORAL at 21:51

## 2024-07-17 RX ADMIN — SODIUM CHLORIDE, PRESERVATIVE FREE 10 ML: 5 INJECTION INTRAVENOUS at 10:19

## 2024-07-17 RX ADMIN — ACETAMINOPHEN 650 MG: 325 TABLET ORAL at 10:16

## 2024-07-17 RX ADMIN — BACITRACIN: 500 OINTMENT TOPICAL at 10:16

## 2024-07-17 RX ADMIN — AMIODARONE HYDROCHLORIDE 200 MG: 200 TABLET ORAL at 10:21

## 2024-07-17 RX ADMIN — ROSUVASTATIN CALCIUM 40 MG: 20 TABLET, FILM COATED ORAL at 21:51

## 2024-07-17 RX ADMIN — POTASSIUM CHLORIDE 10 MEQ: 750 TABLET, FILM COATED, EXTENDED RELEASE ORAL at 10:16

## 2024-07-17 RX ADMIN — FOLIC ACID 1 MG: 1 TABLET ORAL at 10:21

## 2024-07-17 RX ADMIN — ACETAMINOPHEN 650 MG: 325 TABLET ORAL at 19:55

## 2024-07-17 RX ADMIN — LEVETIRACETAM 500 MG: 100 INJECTION INTRAVENOUS at 06:37

## 2024-07-17 RX ADMIN — POLYETHYLENE GLYCOL (3350) 17 G: 17 POWDER, FOR SOLUTION ORAL at 10:16

## 2024-07-17 RX ADMIN — SODIUM CHLORIDE, PRESERVATIVE FREE 10 ML: 5 INJECTION INTRAVENOUS at 21:52

## 2024-07-17 RX ADMIN — FAMOTIDINE 20 MG: 20 TABLET, FILM COATED ORAL at 20:50

## 2024-07-17 RX ADMIN — FAMOTIDINE 20 MG: 20 TABLET, FILM COATED ORAL at 10:16

## 2024-07-17 RX ADMIN — SODIUM CHLORIDE, PRESERVATIVE FREE 10 ML: 5 INJECTION INTRAVENOUS at 10:18

## 2024-07-17 RX ADMIN — BUMETANIDE 1 MG: 1 TABLET ORAL at 10:21

## 2024-07-17 ASSESSMENT — PAIN DESCRIPTION - LOCATION
LOCATION: HEAD;RIB CAGE
LOCATION: HEAD
LOCATION: HEAD

## 2024-07-17 ASSESSMENT — PAIN SCALES - GENERAL
PAINLEVEL_OUTOF10: 4
PAINLEVEL_OUTOF10: 0
PAINLEVEL_OUTOF10: 3
PAINLEVEL_OUTOF10: 3

## 2024-07-17 ASSESSMENT — PAIN DESCRIPTION - ORIENTATION: ORIENTATION: MID

## 2024-07-17 ASSESSMENT — PAIN DESCRIPTION - DESCRIPTORS: DESCRIPTORS: ACHING

## 2024-07-17 ASSESSMENT — PAIN - FUNCTIONAL ASSESSMENT: PAIN_FUNCTIONAL_ASSESSMENT: PREVENTS OR INTERFERES SOME ACTIVE ACTIVITIES AND ADLS

## 2024-07-17 NOTE — PROGRESS NOTES
[facility]  STRZ CVICU 4B  Occupational Therapy  Daily Note  Time:   Time In: 804  Time Out: 844  Timed Code Treatment Minutes: 40 Minutes  Minutes: 40          Date: 2024  Patient Name: Malik Ramirez,   Gender: male      Room: -11/011-A  MRN: [id number]  : 1960  (63 y.o.)  Referring Practitioner: Geri Pittman, JIA - CNP  Diagnosis: Traumatic intraparenchymal hemorrhage  Additional Pertinent Hx: per MD:63 M with PMHx A-fib on Eliquis, CAD on Plavix, alcohol abuse, CHF transferred from [facility] after sustaining a witnessed fall at home, positive head strike and loss of consciousness.  Patient notes he was drinking alcohol at the time.  Multiple areas of ecchymosis across bilateral arms.  CT at [place] showed subdural, subarachnoid hemorrhages.  Repeat CT at [facility] showed increased left temporal parenchymal hemorrhage with associated edema, mass effect, and local sulcal effacement, without midline shift. pt c/o HA.    Restrictions/Precautions:  Restrictions/Precautions: Fall Risk     Social/Functional History:  Lives With: Son  Type of Home: House  Home Layout: One level  Home Access: Stairs to enter without rails  Entrance Stairs - Number of Steps: 2  Home Equipment: None   Bathroom Shower/Tub: Tub/Shower unit  Bathroom Toilet: Standard  Bathroom Equipment: Grab bars in shower  Bathroom Accessibility: Accessible       ADL Assistance: Independent  Homemaking Assistance: Independent  Homemaking Responsibilities: Yes  Ambulation Assistance: Independent  Transfer Assistance: Independent    Active : Yes     Additional Comments: per pt hx of 2 falls when he gets up too quick    SUBJECTIVE: RN approved OT session, at end of session patient voiced increased headache and RN approved. Patient very flat in affect throughout session, agreeable but slow in processing    PAIN: 5/10: headache, voices pain with right side in rib area, RN made aware    Vitals: Nurse checked vitals prior to  for completion.    Standing Balance: Contact Guard Assistance, X 1, with cues for safety, with verbal cues , with increased time for completion. Patient tolerated standing for over 5 minutes to complete oral hygiene and grooming sink side.     BED MOBILITY:  Supine to Sit: Contact Guard Assistance, X 1    Scooting: Contact Guard Assistance, X 1      TRANSFERS:  Sit to Stand:  Contact Guard Assistance, Minimal Assistance, X 1, with increased time for completion, cues for hand placement, with verbal cues.    Stand to Sit: Contact Guard Assistance, Minimal Assistance, X 1, with increased time for completion, cues for hand placement, with verbal cues.      FUNCTIONAL MOBILITY:  Assistive Device: None  Assist Level:  Contact Guard Assistance and Minimal Assistance.   Distance: To and from bathroom  Hand in hand technique for stability, on small LOB while turning as patient noted to have guarded posture with movements      Extensive discussion regarding discharge recommendations and safety concerns with return home. Patient voices understanding in concerns with return home and childrearing tasks.     AM-[facility] Inpatient Daily Activity Raw Score: 20  AM-PAC Inpatient ADL T-Scale Score : 42.03  ADL Inpatient CMS 0-100% Score: 38.32    Modified [place] Scale:  +4 - Moderately severe disability; unable to walk and attend to bodily needs without assistance.   Patient could not live alone and could not walk from one room to another without physical help from another person, but they can sit up in bed without any help.  Education provided regarding stroke rehabilitation management.    ASSESSMENT:     Activity Tolerance:  Patient tolerance of  treatment: Good treatment tolerance      Discharge Recommendations: Continue to assess pending progress, Patient would benefit from continued OT at discharge, Inpatient Therapy Stay, and Recommend Physiatry Consult  Equipment Recommendations: Equipment Needed: No  Other: none  Plan: Times Per

## 2024-07-17 NOTE — PLAN OF CARE
Problem: Discharge Planning  Goal: Discharge to home or other facility with appropriate resources  7/16/2024 2329 by Jeanette Childers RN  Flowsheets (Taken 7/15/2024 1935 by Pamela Ortiz RN)  Discharge to home or other facility with appropriate resources:   Identify barriers to discharge with patient and caregiver   Arrange for needed discharge resources and transportation as appropriate   Identify discharge learning needs (meds, wound care, etc)  Note: Pt not appropriate for discharge at this time, will continue to monitor and reassess.     7/16/2024 0940 by Riky Carcamo RN  Outcome: Progressing     Problem: Pain  Goal: Verbalizes/displays adequate comfort level or baseline comfort level  7/16/2024 2329 by Jeanette Childers RN  Outcome: Progressing  Flowsheets (Taken 7/15/2024 1935 by Pamela Ortiz RN)  Verbalizes/displays adequate comfort level or baseline comfort level:   Encourage patient to monitor pain and request assistance   Assess pain using appropriate pain scale   Implement non-pharmacological measures as appropriate and evaluate response   Administer analgesics based on type and severity of pain and evaluate response  Note: Pt has no complaints of pain at this time. Pain medication management provided. Will continue to monitor and reassess.     7/16/2024 0940 by Riky Carcamo RN  Outcome: Progressing     Problem: Safety - Adult  Goal: Free from fall injury  7/16/2024 2329 by Jeanette Childers RN  Outcome: Progressing  Flowsheets (Taken 7/14/2024 0932 by Vera Dodd, RN)  Free From Fall Injury: Instruct family/caregiver on patient safety  Note: Pt safety education reinforced.    7/16/2024 0940 by Riky Carcamo RN  Outcome: Progressing     Problem: ABCDS Injury Assessment  Goal: Absence of physical injury  7/16/2024 2329 by Jeanette Childers RN  Flowsheets (Taken 7/14/2024 0932 by Vera Dodd, RN)  Absence of Physical Injury: Implement safety measures based on patient assessment  7/16/2024 0940 by  Carcamo, Riky, RN  Outcome: Progressing     Problem: Chronic Conditions and Co-morbidities  Goal: Patient's chronic conditions and co-morbidity symptoms are monitored and maintained or improved  7/16/2024 2329 by Jeanette Childers RN  Outcome: Progressing  Flowsheets (Taken 7/15/2024 1935 by Pamela Ortiz, RN)  Care Plan - Patient's Chronic Conditions and Co-Morbidity Symptoms are Monitored and Maintained or Improved:   Monitor and assess patient's chronic conditions and comorbid symptoms for stability, deterioration, or improvement   Collaborate with multidisciplinary team to address chronic and comorbid conditions and prevent exacerbation or deterioration   Update acute care plan with appropriate goals if chronic or comorbid symptoms are exacerbated and prevent overall improvement and discharge  7/16/2024 0940 by Riky Carcamo RN  Outcome: Progressing     Problem: Neurosensory - Adult  Goal: Achieves stable or improved neurological status  7/16/2024 2329 by Jeanette Childers RN  Outcome: Progressing  Flowsheets (Taken 7/15/2024 1935 by Pamela Ortiz RN)  Achieves stable or improved neurological status:   Assess for and report changes in neurological status   Initiate measures to prevent increased intracranial pressure   Maintain blood pressure and fluid volume within ordered parameters to optimize cerebral perfusion and minimize risk of hemorrhage  Note: Neuro status stable, will continue to monitor per order or every 4 hours and as needed.    7/16/2024 0940 by Rkiy Carcamo RN  Outcome: Progressing     Problem: Skin/Tissue Integrity - Adult  Goal: Skin integrity remains intact  7/16/2024 2329 by Jeanette Childers RN  Outcome: Progressing  Flowsheets (Taken 7/15/2024 1935 by Pamela Ortiz RN)  Skin Integrity Remains Intact: Assess vascular access sites hourly  Note: Skin integrity remains CDI, pt turned every 2 hours, heels elevated off bed. Will continue to complete skin assessments.     7/16/2024 0940 by  respiratory therapy assess nares and determine need for appliance change or resting period.  7/16/2024 2329 by Jeanette Childers, RN  Outcome: Progressing  Note: Skin integrity remains CDI, pt turned every 2 hours, heels elevated off bed. Will continue to complete skin assessments.     7/16/2024 0940 by Riky Carcamo, RN  Outcome: Progressing

## 2024-07-17 NOTE — PROGRESS NOTES
[facility]  Trauma Surgery - Dr. Angel Wilkins  Daily Progress Note    Pt Name: Malik Ramirez  Medical Record Number: [id number]  Date of Birth 1960   Today's Date: 7/17/2024    HD: # 3    CC: Fall    ASSESSMENT  1.  Active Hospital Problems    Diagnosis Date Noted    Permanent atrial fibrillation (HCC) [I48.21]      Priority: High    Traumatic intraventricular hemorrhage (HCC) [S06.30AA] 07/15/2024    SDH (subdural hematoma) (HCC) [S06.5XAA] 07/15/2024    Falls, initial encounter [W19.XXXA] 07/15/2024    SAH (subarachnoid hemorrhage) (HCC) [I60.9] 07/15/2024    Subdural hematoma (HCC) [S06.5XAA] 07/14/2024    Subarachnoid hemorrhage (HCC) [I60.9] 07/14/2024    Fall [W19.XXXA] 07/14/2024    Scalp hematoma, initial encounter [S00.03XA] 07/14/2024    Anticoagulated [Z79.01] 07/14/2024    Antiplatelet or antithrombotic long-term use [Z79.02] 07/14/2024    Trauma [T14.90XA] 07/14/2024    Intracranial hemorrhage (HCC) [I62.9] 07/14/2024    Chronic combined systolic and diastolic CHF (congestive heart failure) (HCC) [I50.42] 12/09/2021    Ischemic cardiomyopathy [I25.5]          PLAN  Patient admitted under Trauma Services (w/ tele) to the ICU    - 07/16:  Okay to transfer to stepdown; transfer orders placed      Trauma by fall   - Fall precautions   - PT OT continue to treat    Subarachnoid, subdural, intraparenchymal hemorrhage   - CT head at OSH reads Small degree of subarachnoid hemorrhage seen along the left temporal lobe inferiorly, with some possible hemorrhagic cortical contusion or subdural hemorrhage seen along the floor of the middle cranial fossa along the   inferior aspect of the left temporal lobe. Small degree of suspected subdural hemorrhage noted along the falx as well as asymmetrically along the left tentorial leaflet.  This measures up to a couple mm in greatest thickness.    - CT interpretation here reads 8 x 4 mm small acute hematoma in the lateral periphery of the right  attest that this medical record entry accurately reflects signatures and notations that I made in my capacity as an M.D. when I treated and diagnosed Malik Ramirez on the date of service above     I was responsible for all medical decision making involving this encounter.      I identified and/or confirmed all problems associated with this patient encounter by my own direct physical examination of this patient and review of all radiology studies and labwork  that were ordered and available.    Active Hospital Problems    Diagnosis     Permanent atrial fibrillation (HCC) [I48.21]      Priority: High    Traumatic intraventricular hemorrhage (HCC) [S06.30AA]     SDH (subdural hematoma) (HCC) [S06.5XAA]     Falls, initial encounter [W19.XXXA]     SAH (subarachnoid hemorrhage) (HCC) [I60.9]     Subdural hematoma (HCC) [S06.5XAA]     Subarachnoid hemorrhage (HCC) [I60.9]     Fall [W19.XXXA]     Scalp hematoma, initial encounter [S00.03XA]     Anticoagulated [Z79.01]     Antiplatelet or antithrombotic long-term use [Z79.02]     Trauma [T14.90XA]     Intracranial hemorrhage (HCC) [I62.9]     Chronic combined systolic and diastolic CHF (congestive heart failure) (HCC) [I50.42]     Ischemic cardiomyopathy [I25.5]         I  discussed the management of all of the identified problems with the APN or PA.      I formulated the treatment plan for all identified problems and discussed those with the APN or PA .      This management plan was then carried out and the patient's orders for care by the APN or PA.      Total time personally spent on this patient encounter was 36 minutes which includes :  Time does not include procedures.    Please see our orders that were directed and approved by me if there are any new ones for the updated patient care plan.    Above discussed and I agree with documentation and orders placed by Ольга Melgar NP    See any additional comments if needed below for any other updated orders and plans.

## 2024-07-17 NOTE — CONSULTS
Physical Medicine & Rehabilitation   Consult Note      Admitting Physician: Angel Wilkins MD    Primary Care Provider: Erick Veronica MD     Reason for Consult: SDH, SAH, IPH, rehab needs    History of Present Illness:  Malik Ramirez is a 63 y.o. male admitted to [facility] on 7/14/2024. Patient  has a past medical history of Anxiety, Arthritis, Atrial fibrillation (HCC), CAD (coronary artery disease), Cardiomyopathy (HCC), and Hypertension.  Patient originally presented to [facility] after suffering a fall with head injury.  Patient reports that he was on some steps when he slipped, fell backwards, hitting his head on a concrete step.  Patient reports a possible brief loss of consciousness.  Patient on Eliquis for atrial fibrillation and Plavix for coronary artery disease.  Patient admitted to drinking alcohol at that time.  Reports drinking several times a week.  Denies vomiting but reported some nausea. patient with some complaints of neck pain as well and was placed in c-collar.  CT of the spine was without acute findings of the spine, but incidentally found a 7.5 mm pulmonary nodule right upper lobe recommended for follow-up with CT scan in 12 months.  CT of the head showed a subarachnoid hemorrhage and suspected subdural hematoma.  Patient was given Kcentra IV.  Patient was transferred to [facility] ICU by air.  Upon arrival to [facility], approximately 3 hours from initial presentation patient's EtOH level was 0.12.  Patient was started on 3% IV fluids and started on Keppra twice daily.  Daily CTs of the head were completed initially with increased in the parenchymal hemorrhage, on 7/15 and 7/16 this was read as stable on the CT of the head.  Patient was able to be weaned from the 3% IV solution on 7/16.  During his acute care stay patient was not in need of alcohol withdrawal medications.  On 716 patient worked with therapy: Speech with an o-log of 27 out of 30,  for  chest pain, dyspnea, palpitations  GASTROINTESTINAL:  negative for nausea, abdominal pain, and reflux  GENITOURINARY:  negative  SKIN:  negative  HEMATOLOGIC/LYMPHATIC:  positive for swelling/edema occasionally, Bumex helps at home  MUSCULOSKELETAL:  positive for  muscle weakness  NEUROLOGICAL:  positive for headaches, gait problems, and weakness  BEHAVIOR/PSYCH:  negative  System review otherwise negative    Physical Exam:  BP (!) 160/71   Pulse 55   Temp 97.6 °F (36.4 °C) (Oral)   Resp 18   Ht 1.829 m (6')   Wt 67 kg (147 lb 11.3 oz)   SpO2 94%   BMI 20.03 kg/m²   awake  Orientation:   person, place, time  Mood: within normal limits  Affect: calm  General appearance: Patient is well nourished, well developed, well groomed and in no acute distress, appearing older than stated age    Memory:   normal,   Attention/Concentration: normal  Language:  normal    Cranial Nerves:  no obvious deficits noted  ROM:  normal  Motor Exam:  Motor exam is symmetrical 5- out of 5 all extremities bilaterally  Tone:  normal  Muscle bulk: within normal limits  Sensory:  Sensory intact  Coordination:   normal    Heart: no shortness of breath, extremities well perfused  Lungs: no audible wheezing or crackles, no increased WOB  Abdomen: soft, non-tender and non-distended    Skin: warm and dry, no rash or erythema  Peripheral vascular: Pulses: Normal upper and lower extremity pulses; Edema: trace      Diagnostics:  No results found for this or any previous visit (from the past 24 hour(s)).      CT HEAD WO CONTRAST  Result Date: 7/16/2024  FINDINGS: Previously noted small bleed in the periphery of the right parietal lobe measures approximately 3-4 mm on coronal image 30, and does not appear to be significantly changed compared to the prior study. Previously noted intraparenchymal blood in association with the left temporal lobe does not appear to be significantly changed, for example seen on axial image 25. Tiny amount of

## 2024-07-17 NOTE — PROGRESS NOTES
[facility] Adult Intravenous to Oral Protocol    Keppra changed to PO per [facility] P&T IV to PO protocol.    Patient meets criteria based on the following:  Tolerating diet more advanced than clear liquids: Yes  Tolerating other oral medications: Yes  Not on vasopressors: Yes  No nausea/vomiting within past 24 hours: Yes  No active GI bleed:  Yes  No gastrectomy, gastric outlet or bowel obstruction, ileus, malabsorption syndrome: Yes  No seizures for 72 hours (antiepileptics only): Yes      Thank you,    Clarissa Nowak, PharmD  4:24 PM  7/17/2024

## 2024-07-17 NOTE — CARE COORDINATION
DISCHARGE PLANNING EVALUATION  7/17/24, 2:30 PM EDT    Reason for Referral: Arrange for HH per pt request.  Decision Maker: makes own decisions.  Current Services: none reported. No dme in the home.  New Services Requested: [facility].  Family/ Social/ Home environment: Assessment completed with pt, states he lives at home with his 17 yr old son. Pt states he is totally independent, does his own bathing, gets dressed, cooks and cleans. Pt states he does drive. Pt states he will return home at discharge.   Payment Source:Medicare and [email or web address]  Transportation at Discharge: family  Post-acute (PAC) provider list was provided to patient. Patient was informed of their freedom to choose PAC provider. Discussed and offered to show the patient the relevant PAC Providers quality and resource use measures on Medicare Compare web site via computer based on patient's goals of care and treatment preferences. Questions regarding selection process were answered.      Teach Back Method used with pt regarding care plan and discharge planning.  Patient verbalized understanding of the plan of care and contribute to goal setting.       Patient preferences and discharge plan: planning home with son and [facility].    Electronically signed by ODILON Kat on 7/17/2024 at 2:30 PM

## 2024-07-17 NOTE — PROGRESS NOTES
[facility]  INPATIENT PHYSICAL THERAPY  DAILY NOTE  [facility] CVICU 4B - 4B-11/011-A    Time In: 1125  Time Out: 1154  Timed Code Treatment Minutes: 29 Minutes  Minutes: 29          Date: 2024  Patient Name: Malik Ramirez,  Gender:  male        MRN: [id number]  : 1960  (63 y.o.)     Referring Practitioner: LEONARDO Jensen  Diagnosis: trauma  Additional Pertinent Hx: per MD:63 M with PMHx A-fib on Eliquis, CAD on Plavix, alcohol abuse, CHF transferred from [facility] after sustaining a witnessed fall at home, positive head strike and loss of consciousness.  Patient notes he was drinking alcohol at the time.  Multiple areas of ecchymosis across bilateral arms.  CT at [place] showed subdural, subarachnoid hemorrhages.  Repeat CT at [facility] showed increased left temporal parenchymal hemorrhage with associated edema, mass effect, and local sulcal effacement, without midline shift. pt c/o HA.     Prior Level of Function:  Lives With: Son  Type of Home: House  Home Layout: One level  Home Access: Stairs to enter without rails  Entrance Stairs - Number of Steps: 2  Home Equipment: None   Bathroom Shower/Tub: Tub/Shower unit  Bathroom Toilet: Standard  Bathroom Equipment: Grab bars in shower  Bathroom Accessibility: Accessible    ADL Assistance: Independent  Homemaking Assistance: Independent  Homemaking Responsibilities: Yes  Ambulation Assistance: Independent  Transfer Assistance: Independent  Active : Yes  Additional Comments: per pt hx of 2 falls when he gets up too quick    Restrictions/Precautions:  Restrictions/Precautions: Fall Risk     SUBJECTIVE: Pt in recliner upon arrival, and agrees to therapy, RN approved session, Pt A&O X , Pt flat but cooperative, [facility]    PAIN: R side \"they gave me some tylenol earlier and that helped\" 2/10    Vitals: Vitals not assessed per clinical judgement, see nursing flowsheet  Vitals:    24 1202   BP: 135/66   Pulse: 61   Resp: 18   Temp: 97.9  Recommendations: Inpatient Rehabilitation if agreeable  Plan: Current Treatment Recommendations: Strengthening, Balance training, Functional mobility training, Transfer training, Endurance training, Home exercise program, Stair training, Gait training, Safety education & training, Patient/Caregiver education & training, Therapeutic activities  General Plan:  (5X N)    Education:  Learners: Patient  Patient Education: Plan of Care, Transfers, Gait, Verbal Exercise Instruction    Goals:  Patient Goals : go home  Short Term Goals  Time Frame for Short Term Goals: by discharge  Short Term Goal 1: bed mobility with I to get in/out of bed  Short Term Goal 2: transfer with I to get in/out of chairs  Short Term Goal 3: amb >150'x1 without AD and I to walk safely in home  Short Term Goal 4: negotiate 2 steps without HR and I to enter home safely  Long Term Goals  Time Frame for Long Term Goals : no LTGs set secondary to short ELOS    Following session, patient left in safe position with all fall risk precautions in place.

## 2024-07-17 NOTE — PROCEDURES
PROCEDURE NOTE  Date: 7/16/2024   Name: Malik Ramirez  YOB: 1960    Procedures    Bladder scan completed in the amount of 302 ml and handed to RN

## 2024-07-17 NOTE — PROGRESS NOTES
07/17/24 0941   Encounter Summary   Encounter Overview/Reason Spiritual/Emotional Needs   Service Provided For Patient   Referral/Consult From Rounding   Support System Family members   Last Encounter  07/17/24   Complexity of Encounter Moderate   Begin Time 0933   End Time  0941   Total Time Calculated 8 min   Spiritual/Emotional needs   Type Spiritual Support   Assessment/Intervention/Outcome   Assessment Coping   Intervention Nurtured Hope;Prayer (assurance of)/[place];Sustaining Presence/Ministry of presence   Outcome Comfort     Assessment:  In my encounter with the 63 yr old patient, while rounding  the unit 4B, I provided spiritual care to patient through conversation, I also came to assess the patient's spiritual needs present. The pt was admitted due to intracranial hemorrhage.     Interventions:  I provided prayer, emotional support and words of comfort.  provided a listening presence and encouraged pt to share their beliefs and how I can support them during their hospitalization.     Outcomes:  The patient was encouraged and didn't share any further spiritual needs at this time.     Plan:  Chaplains will follow-up at a later time for assessment of any spiritual care needs present.

## 2024-07-17 NOTE — CARE COORDINATION
7/17/24, 8:25 AM EDT    DISCHARGE ON GOING EVALUATION    [facility] day: 3  Location: -11/011-A Reason for admit: Trauma [T14.90XA]     Procedures:   7/15 ECHO EF 55-60%    Imaging since last note: none      Barriers to Discharge: PT/OT/SLP, Neurosurgery to follow prn, Trauma following. Na 141, Lovenox, IV Keppra, Phenobarbital protocol for ETOH withdrawal. Physiatry consult.     PCP: Erick Veronica MD  Readmission Risk Score: 12.6    Patient Goals/Plan/Treatment Preferences: Plans home with son. Denies needs.  IPR consult in place will follow.

## 2024-07-17 NOTE — PLAN OF CARE
Problem: Discharge Planning  Goal: Discharge to home or other facility with appropriate resources  Outcome: Progressing  Flowsheets (Taken 7/17/2024 0855 by Angel Ji, RN)  Discharge to home or other facility with appropriate resources:   Identify barriers to discharge with patient and caregiver   Arrange for needed discharge resources and transportation as appropriate   Identify discharge learning needs (meds, wound care, etc)   Refer to discharge planning if patient needs post-hospital services based on physician order or complex needs related to functional status, cognitive ability or social support system     Problem: Pain  Goal: Verbalizes/displays adequate comfort level or baseline comfort level  Outcome: Progressing     Problem: Safety - Adult  Goal: Free from fall injury  Outcome: Progressing     Problem: ABCDS Injury Assessment  Goal: Absence of physical injury  Outcome: Progressing     Problem: Chronic Conditions and Co-morbidities  Goal: Patient's chronic conditions and co-morbidity symptoms are monitored and maintained or improved  Outcome: Progressing  Flowsheets (Taken 7/17/2024 0855 by Agnel Ji, RN)  Care Plan - Patient's Chronic Conditions and Co-Morbidity Symptoms are Monitored and Maintained or Improved:   Monitor and assess patient's chronic conditions and comorbid symptoms for stability, deterioration, or improvement   Collaborate with multidisciplinary team to address chronic and comorbid conditions and prevent exacerbation or deterioration   Update acute care plan with appropriate goals if chronic or comorbid symptoms are exacerbated and prevent overall improvement and discharge     Problem: Neurosensory - Adult  Goal: Achieves stable or improved neurological status  Outcome: Progressing  Flowsheets (Taken 7/17/2024 0855 by Angel Ji, RN)  Achieves stable or improved neurological status:   Assess for and report changes in neurological status   Initiate measures to prevent

## 2024-07-18 VITALS
TEMPERATURE: 98 F | OXYGEN SATURATION: 96 % | HEART RATE: 61 BPM | SYSTOLIC BLOOD PRESSURE: 143 MMHG | DIASTOLIC BLOOD PRESSURE: 69 MMHG | WEIGHT: 140.65 LBS | BODY MASS INDEX: 19.05 KG/M2 | RESPIRATION RATE: 18 BRPM | HEIGHT: 72 IN

## 2024-07-18 PROCEDURE — 6370000000 HC RX 637 (ALT 250 FOR IP): Performed by: OCCUPATIONAL THERAPIST

## 2024-07-18 PROCEDURE — 2580000003 HC RX 258: Performed by: PHYSICIAN ASSISTANT

## 2024-07-18 PROCEDURE — 99239 HOSP IP/OBS DSCHRG MGMT >30: CPT | Performed by: SURGERY

## 2024-07-18 PROCEDURE — 6360000002 HC RX W HCPCS: Performed by: SURGERY

## 2024-07-18 PROCEDURE — 6370000000 HC RX 637 (ALT 250 FOR IP)

## 2024-07-18 PROCEDURE — 6370000000 HC RX 637 (ALT 250 FOR IP): Performed by: PHYSICIAN ASSISTANT

## 2024-07-18 RX ORDER — TRAMADOL HYDROCHLORIDE 50 MG/1
50 TABLET ORAL EVERY 6 HOURS PRN
Qty: 12 TABLET | Refills: 0 | Status: SHIPPED | OUTPATIENT
Start: 2024-07-18 | End: 2024-07-23

## 2024-07-18 RX ORDER — LEVETIRACETAM 500 MG/1
500 TABLET ORAL 2 TIMES DAILY
Qty: 14 TABLET | Refills: 0 | Status: SHIPPED | OUTPATIENT
Start: 2024-07-18 | End: 2024-07-29

## 2024-07-18 RX ADMIN — FAMOTIDINE 20 MG: 20 TABLET, FILM COATED ORAL at 08:27

## 2024-07-18 RX ADMIN — ACETAMINOPHEN 650 MG: 325 TABLET ORAL at 08:26

## 2024-07-18 RX ADMIN — AMIODARONE HYDROCHLORIDE 200 MG: 200 TABLET ORAL at 08:27

## 2024-07-18 RX ADMIN — POTASSIUM CHLORIDE 10 MEQ: 750 TABLET, FILM COATED, EXTENDED RELEASE ORAL at 08:26

## 2024-07-18 RX ADMIN — BACITRACIN: 500 OINTMENT TOPICAL at 08:25

## 2024-07-18 RX ADMIN — FOLIC ACID 1 MG: 1 TABLET ORAL at 08:28

## 2024-07-18 RX ADMIN — Medication 1 TABLET: at 08:28

## 2024-07-18 RX ADMIN — TRAMADOL HYDROCHLORIDE 50 MG: 50 TABLET ORAL at 11:04

## 2024-07-18 RX ADMIN — BUMETANIDE 1 MG: 1 TABLET ORAL at 08:27

## 2024-07-18 RX ADMIN — LEVETIRACETAM 500 MG: 500 TABLET, FILM COATED ORAL at 08:26

## 2024-07-18 RX ADMIN — POLYETHYLENE GLYCOL (3350) 17 G: 17 POWDER, FOR SOLUTION ORAL at 08:26

## 2024-07-18 RX ADMIN — ENOXAPARIN SODIUM 40 MG: 100 INJECTION SUBCUTANEOUS at 08:26

## 2024-07-18 RX ADMIN — Medication 100 MG: at 08:28

## 2024-07-18 RX ADMIN — SODIUM CHLORIDE, PRESERVATIVE FREE 10 ML: 5 INJECTION INTRAVENOUS at 08:29

## 2024-07-18 ASSESSMENT — PAIN DESCRIPTION - LOCATION
LOCATION: RIB CAGE
LOCATION: HEAD

## 2024-07-18 ASSESSMENT — PAIN DESCRIPTION - ORIENTATION
ORIENTATION: MID
ORIENTATION: RIGHT

## 2024-07-18 ASSESSMENT — PAIN DESCRIPTION - DESCRIPTORS
DESCRIPTORS: ACHING
DESCRIPTORS: ACHING

## 2024-07-18 ASSESSMENT — PAIN SCALES - GENERAL
PAINLEVEL_OUTOF10: 2
PAINLEVEL_OUTOF10: 2
PAINLEVEL_OUTOF10: 1
PAINLEVEL_OUTOF10: 8

## 2024-07-18 NOTE — CARE COORDINATION
7/18/24, 12:17 PM EDT    DISCHARGE PLANNING EVALUATION    This SW did speak with Zeina from [facility] and provided her with referral information. She will call SW if there are any problems, she just asked to be called when he is discharged.

## 2024-07-18 NOTE — DISCHARGE SUMMARY
Discharge Summary   Trauma Services    Patient Identification:  Malik Ramirez  : 1960  MRN: [id number]   Account: [id number]     Admit date: 2024  Discharge date: 24  Attending provider: Angel Wilkins MD        Primary care provider: Erick Veronica MD     Discharge Diagnoses:   Principal Problem:    Intracranial hemorrhage (HCC)  Active Problems:    Permanent atrial fibrillation (HCC)    Ischemic cardiomyopathy    Chronic combined systolic and diastolic CHF (congestive heart failure) (HCC)    Subdural hematoma (HCC)    Subarachnoid hemorrhage (HCC)    Fall    Scalp hematoma, initial encounter    Anticoagulated    Antiplatelet or antithrombotic long-term use    Trauma    Traumatic intraventricular hemorrhage (HCC)    SDH (subdural hematoma) (HCC)    Falls, initial encounter    SAH (subarachnoid hemorrhage) (HCC)  Resolved Problems:    * No resolved hospital problems. *       Hospital Course:   Malik Ramirez is a 63 y.o. male admitted to [facility] on 2024 for management of intracranial hemorrhage following a fall with positive loss of consciousness. Report stated patient had been consuming alcohol that night, had a posterior fall and struck his head.  At outside hospital was found to have subdural and subarachnoid hemorrhage.  Patient was transferred here for further care..  Admitted under trauma services to ICU and stepdown. Inpatient management included as follows:    ICH -neurosurgery consult, TXA, blood pressure management, Kcentra and vitamin K for anticoagulation reversal, multiple repeat head Cts. Hold blood thinners until 1 week follow up with Dr. Browne of neurosurgery with repeat CT head     Hematoma: Pain control, lab monitoring, serial H&H    Closed head injury: Speech evaluation and concussion monitoring    Chronic alcohol abuse: Withdrawal protocol in effect no need for medications.  Encourage alcohol cessation    Hyponatremia: IVF and lab  07/16/24  6:16 AM   Result Value Ref Range    Sodium 140 135 - 145 meq/L       Discharge condition: Stable  Disposition: Home    Electronically signed by Saranya Saenz PA-C on 7/18/2024 at 12:15 PM

## 2024-07-18 NOTE — PLAN OF CARE
Problem: Pain  Goal: Verbalizes/displays adequate comfort level or baseline comfort level  7/18/2024 0947 by Darien Rawls, RN  Outcome: Progressing  Flowsheets (Taken 7/18/2024 0947)  Verbalizes/displays adequate comfort level or baseline comfort level: Encourage patient to monitor pain and request assistance     Problem: Safety - Adult  Goal: Free from fall injury  7/18/2024 0947 by Darien Rawls, RN  Outcome: Progressing  Flowsheets (Taken 7/18/2024 0947)  Free From Fall Injury: Instruct family/caregiver on patient safety

## 2024-07-18 NOTE — PROGRESS NOTES
Malik Ramirez was evaluated today and a DME order was entered for a standard rolling walker because he requires this to successfully complete daily living tasks of toileting, personal cares, ambulating, grooming, hygiene, dressing upper body, dressing lower body, meal preparation, and taking own medications.  A standard rolling walker is necessary due to the patient's impaired ambulation and mobility restrictions and he can ambulate only by using a walker instead of a lesser assistive device, such as a cane or crutch.  The need for this equipment was discussed with the patient and he understands and is in agreement.

## 2024-07-18 NOTE — PROGRESS NOTES
[facility]  OCCUPATIONAL THERAPY MISSED TREATMENT NOTE  [facility] CVICU 4B  4B-11/011-A      Date: 2024  Patient Name: Malik Ramirez        CSN: [id number]   : 1960  (63 y.o.)  Gender: male   Referring Practitioner: Geri Pittman APRN - CNP  Diagnosis: Traumatic intraparenchymal hemorrhage         REASON FOR MISSED TREATMENT: Patient Refused.  Pt reprots he has a headache and not wanting to get out of bed yet today. Breakfast tray sitting next to bed untouched. Pt reports he does not feel like eating this AM. Will check back as time allows.

## 2024-07-18 NOTE — PROGRESS NOTES
Discharge teaching and instructions for diagnosis/procedure of intracranial hemorrhage completed with patient using teachback method. AVS reviewed. Printed prescriptions given to patient. Patient voiced understanding regarding prescriptions, follow up appointments, and care of self at home. Discharged in a wheelchair to  home with support per family

## 2024-07-18 NOTE — PROGRESS NOTES
125/70     Pulse Readings from Last 3 Encounters:   07/18/24 61   03/26/24 62   11/08/23 51       24 HR INTAKE/OUTPUT :   Intake/Output Summary (Last 24 hours) at 7/18/2024 1153  Last data filed at 7/18/2024 1114  Gross per 24 hour   Intake 360 ml   Output 900 ml   Net -540 ml       ADULT DIET; Regular    OBJECTIVE  CURRENT VITALS BP (!) 143/69   Pulse 61   Temp 98 °F (36.7 °C) (Oral)   Resp 18   Ht 1.829 m (6')   Wt 63.8 kg (140 lb 10.5 oz)   SpO2 96%   BMI 19.08 kg/m²   GENERAL: Awake, alert, no acute distress, pleasant and cooperative with exam.  Chronically ill-appearing  HEENT: Normocephalic, right occipitoparietal hematoma, approx 7 cm with no laceration but large scab, no bleeding, pupils equal and reactive to light, nares patent bilaterally  NEURO: Alert and orient x3, recalls details of the situation, GCS 15, follows commands, PMS intact in all four extremities, no signs of focal neurological deficits  CSPINE/BACK: NTTP C-spine and upper T-spine  HEART: Regular rate and rhythm with no obvious murmurs, rubs, gallops.  Distal pulses intact.  LUNGS/CHEST WALL: Lungs are clear to auscultation bilaterally with no wheezes, rales, rhonchi.  No respiratory distress or increased work of breathing.  No chest wall tenderness to palpation.    ABDOMEN: Abdomen soft, nondistended, with no tenderness to palpation.  No guarding or peritoneal signs.  Bowel sounds normal active  EXTREMITIES: No cyanosis or edema.  PMS intact in all four extremities.  No extremity tenderness to palpation.  Range of motion intact.   SKIN: Warm and dry  WOUNDS: Bilateral forearms and legs covered in bruises, ecchymosis of various stages, resolving, left dorsum of the hand.  Left posterior forearm with clot-like mounds of old dried blood, now covered with dry wrap dressing.  Padded dressings to bilateral elbows    LABS  CBC :   Recent Labs     07/16/24  0356   WBC 6.8   HGB 10.6*   HCT 32.2*   .2*          BMP:   Recent Labs  patient's condition and treatment options with the patient, if able, and/or designated family if available.      I have also reviewed and agree with the past medical,  family and social history updates as well as care plans unless otherwise noted below.  All questions were answered.      I examined independently and reviewed relevant data myself and may have done so in the context of team rounds.  A full chart review was performed by me.       I attest that this medical record entry accurately reflects signatures and notations that I made in my capacity as an M.D. when I treated and diagnosed Malik Ramirez on the date of service above     I was responsible for all medical decision making involving this encounter.      I identified and/or confirmed all problems associated with this patient encounter by my own direct physical examination of this patient and review of all radiology studies and labwork  that were ordered and available.    Active Hospital Problems    Diagnosis     Permanent atrial fibrillation (HCC) [I48.21]      Priority: High    Traumatic intraventricular hemorrhage (HCC) [S06.30AA]     SDH (subdural hematoma) (HCC) [S06.5XAA]     Falls, initial encounter [W19.XXXA]     SAH (subarachnoid hemorrhage) (HCC) [I60.9]     Subdural hematoma (HCC) [S06.5XAA]     Subarachnoid hemorrhage (HCC) [I60.9]     Fall [W19.XXXA]     Scalp hematoma, initial encounter [S00.03XA]     Anticoagulated [Z79.01]     Antiplatelet or antithrombotic long-term use [Z79.02]     Trauma [T14.90XA]     Intracranial hemorrhage (HCC) [I62.9]     Chronic combined systolic and diastolic CHF (congestive heart failure) (HCC) [I50.42]     Ischemic cardiomyopathy [I25.5]         I  discussed the management of all of the identified problems with the APN or PA.      I formulated the treatment plan for all identified problems and discussed those with the APN or PA .      This management plan was then carried out and the patient's orders for  care by the APN or PA.      Total time personally spent on this patient encounter was 38 minutes which includes :  Time does not include procedures.    Please see our orders that were directed and approved by me if there are any new ones for the updated patient care plan.    Above discussed and I agree with documentation and orders placed by Saranya Saenz PA    See any additional comments if needed below for any other updated orders and plans.

## 2024-07-18 NOTE — PLAN OF CARE
Problem: Discharge Planning  Goal: Discharge to home or other facility with appropriate resources  7/17/2024 2254 by Jihan Calzada RN  Outcome: Progressing  Flowsheets (Taken 7/17/2024 0855 by Angel Ji, RN)  Discharge to home or other facility with appropriate resources:   Identify barriers to discharge with patient and caregiver   Arrange for needed discharge resources and transportation as appropriate   Identify discharge learning needs (meds, wound care, etc)   Refer to discharge planning if patient needs post-hospital services based on physician order or complex needs related to functional status, cognitive ability or social support system     Problem: Pain  Goal: Verbalizes/displays adequate comfort level or baseline comfort level  7/17/2024 2254 by Jihan Calzada RN  Outcome: Progressing  Flowsheets (Taken 7/17/2024 2254)  Verbalizes/displays adequate comfort level or baseline comfort level:   Encourage patient to monitor pain and request assistance   Assess pain using appropriate pain scale     Problem: Safety - Adult  Goal: Free from fall injury  7/17/2024 2254 by Jihan Calzada RN  Outcome: Progressing  Flowsheets (Taken 7/17/2024 2254)  Free From Fall Injury: Instruct family/caregiver on patient safety     Problem: ABCDS Injury Assessment  Goal: Absence of physical injury  7/17/2024 2254 by Jihan Calzada RN  Outcome: Progressing  Flowsheets (Taken 7/17/2024 2254)  Absence of Physical Injury: Implement safety measures based on patient assessment     Problem: Chronic Conditions and Co-morbidities  Goal: Patient's chronic conditions and co-morbidity symptoms are monitored and maintained or improved  7/17/2024 2254 by Jihan Calzada RN  Outcome: Progressing  Flowsheets (Taken 7/17/2024 0855 by Angel Ji, RN)  Care Plan - Patient's Chronic Conditions and Co-Morbidity Symptoms are Monitored and Maintained or Improved:   Monitor and assess patient's chronic conditions and comorbid  symptoms for stability, deterioration, or improvement   Collaborate with multidisciplinary team to address chronic and comorbid conditions and prevent exacerbation or deterioration   Update acute care plan with appropriate goals if chronic or comorbid symptoms are exacerbated and prevent overall improvement and discharge     Problem: Neurosensory - Adult  Goal: Achieves stable or improved neurological status  7/17/2024 2254 by Jihan Calzada RN  Outcome: Progressing     Problem: Skin/Tissue Integrity - Adult  Goal: Incisions, wounds, or drain sites healing without S/S of infection  7/17/2024 2254 by Jihan Calzada RN  Outcome: Progressing     Problem: Musculoskeletal - Adult  Goal: Return mobility to safest level of function  7/17/2024 2254 by Jihan Calzada RN  Outcome: Progressing  Flowsheets (Taken 7/17/2024 2254)  Return Mobility to Safest Level of Function: Assess patient stability and activity tolerance for standing, transferring and ambulating with or without assistive devices     Problem: Hematologic - Adult  Goal: Maintains hematologic stability  7/17/2024 2254 by Jihan Calzada RN  Outcome: Progressing  Flowsheets (Taken 7/17/2024 0855 by Angel Ji RN)  Maintains hematologic stability:   Assess for signs and symptoms of bleeding or hemorrhage   Monitor labs for bleeding or clotting disorders   Administer blood products/factors as ordered     Problem: Skin/Tissue Integrity  Goal: Absence of new skin breakdown  Description: 1.  Monitor for areas of redness and/or skin breakdown  2.  Assess vascular access sites hourly  3.  Every 4-6 hours minimum:  Change oxygen saturation probe site  4.  Every 4-6 hours:  If on nasal continuous positive airway pressure, respiratory therapy assess nares and determine need for appliance change or resting period.  7/17/2024 2254 by Jihan Calzada RN  Outcome: Progressing

## 2024-07-19 NOTE — CARE COORDINATION
7/19/24, 9:45 AM EDT    Patient goals/plan/ treatment preferences discussed by  and .  Patient goals/plan/ treatment preferences reviewed with patient/ family.  Patient/ family verbalize understanding of discharge plan and are in agreement with goal/plan/treatment preferences.  Understanding was demonstrated using the teach back method.  AVS provided by RN at time of discharge, which includes all necessary medical information pertaining to the patients current course of illness, treatment, post-discharge goals of care, and treatment preferences.     Services At/After Discharge: Home Health, Nursing service, OT, and PT- [facility]    João was discharged yesterday afternoon to home with new [facility] for RN, PT and OT services. ILA spoke with Vera this morning and made her aware of patient's discharge.

## 2024-07-25 ENCOUNTER — HOSPITAL ENCOUNTER (OUTPATIENT)
Dept: CT IMAGING | Age: 64
Discharge: HOME | End: 2024-07-25
Attending: NEUROLOGICAL SURGERY
Payer: MEDICARE

## 2024-07-25 DIAGNOSIS — I60.9 SAH (SUBARACHNOID HEMORRHAGE) (HCC): ICD-10-CM
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
## 2024-07-25 DIAGNOSIS — I62.9 INTRACRANIAL HEMORRHAGE (HCC): ICD-10-CM

## 2024-07-25 DIAGNOSIS — S06.5XAA SDH (SUBDURAL HEMATOMA) (HCC): ICD-10-CM

## 2024-07-25 DIAGNOSIS — S06.30AA: ICD-10-CM

## 2024-07-25 PROCEDURE — 70450 CT HEAD/BRAIN W/O DYE: CPT

## 2024-07-29 ENCOUNTER — OFFICE VISIT (OUTPATIENT)
Dept: NEUROSURGERY | Age: 64
End: 2024-07-29
Payer: MEDICARE

## 2024-07-29 VITALS
WEIGHT: 150 LBS | SYSTOLIC BLOOD PRESSURE: 144 MMHG | HEIGHT: 72 IN | DIASTOLIC BLOOD PRESSURE: 75 MMHG | HEART RATE: 70 BPM | BODY MASS INDEX: 20.32 KG/M2

## 2024-07-29 DIAGNOSIS — I60.9 SUBARACHNOID HEMORRHAGE (HCC): Primary | ICD-10-CM

## 2024-07-29 DIAGNOSIS — I62.9 INTRACRANIAL HEMORRHAGE (HCC): ICD-10-CM

## 2024-07-29 PROCEDURE — 4004F PT TOBACCO SCREEN RCVD TLK: CPT | Performed by: PHYSICIAN ASSISTANT

## 2024-07-29 PROCEDURE — 1111F DSCHRG MED/CURRENT MED MERGE: CPT | Performed by: PHYSICIAN ASSISTANT

## 2024-07-29 PROCEDURE — 1124F ACP DISCUSS-NO DSCNMKR DOCD: CPT | Performed by: PHYSICIAN ASSISTANT

## 2024-07-29 PROCEDURE — G8427 DOCREV CUR MEDS BY ELIG CLIN: HCPCS | Performed by: PHYSICIAN ASSISTANT

## 2024-07-29 PROCEDURE — 3017F COLORECTAL CA SCREEN DOC REV: CPT | Performed by: PHYSICIAN ASSISTANT

## 2024-07-29 PROCEDURE — 99214 OFFICE O/P EST MOD 30 MIN: CPT | Performed by: PHYSICIAN ASSISTANT

## 2024-07-29 PROCEDURE — G8420 CALC BMI NORM PARAMETERS: HCPCS | Performed by: PHYSICIAN ASSISTANT

## 2024-07-29 RX ORDER — TRAMADOL HYDROCHLORIDE 50 MG/1
50 TABLET ORAL EVERY 6 HOURS PRN
COMMUNITY
Start: 2024-07-24

## 2024-07-29 RX ORDER — DAPAGLIFLOZIN 10 MG/1
10 TABLET, FILM COATED ORAL EVERY MORNING
COMMUNITY

## 2024-07-29 RX ORDER — CLOPIDOGREL BISULFATE 75 MG/1
75 TABLET ORAL DAILY
COMMUNITY

## 2024-07-29 RX ORDER — DIGOXIN 125 MCG
125 TABLET ORAL DAILY
COMMUNITY

## 2024-07-29 NOTE — PROGRESS NOTES
[facility] [place] [facility]  [facility] NEUROSURGERY  [address]  [facility] [address]  Dept: [phone]  Dept Fax: [phone]  Loc: [phone]    Hospital follow-up visit  Visit Date: 7/29/2024      Malik  is a 63 y.o. male who is returning to the office today for a post-discharge/hospital follow-up visit to assess progress towards resolution of parenchymal hemorrhage with trace subarachnoid component evidenced on recent hospitalization.  Patient was most recently seen and evaluated by our service, while an inpatient at [facility], on 7/16/2024 where he was stable with a stable CT scan and absent any indication for acute neurosurgical intervention this follow-up was scheduled and includes a new CT scan of the head imaged on 7/26/2024 which reveals no residual hemorrhage in the posterior temporal lobe.  He arrives today accompanied by his granddaughter ambulating with assistance of a rolling 4 point walker and is comfortable with pain well-controlled.  He does complain of some fatigue but is otherwise stable with no appreciable neurodeficits noted.  We reviewed the latest CT scan revealing resolution of the prior hemorrhage and based on the resolution of the hemorrhage and the stable improving nature of today's exam, we have agreed to follow him as needed with encouragement for he and his family to reach out to our service with any additional questions or concerns.  He is cleared to resume anticoagulant medications as prescribed and has already discontinued Keppra.     Patient was evaluated today and is doing very well overall.  No new complaints were voiced.  Patient  lives with their family  Wound: none  Follow-up Studies: No orders of the defined types were placed in this encounter.       Assessment/Plan:  Status Post resolved intracranial hemorrhage  Doing very well overall  Encouraged gradual increase in physical and mental activity.  Fall precaution and home

## 2024-07-30 RX ORDER — ROSUVASTATIN CALCIUM 40 MG/1
40 TABLET, COATED ORAL NIGHTLY
Qty: 90 TABLET | Refills: 0 | Status: SHIPPED | OUTPATIENT
Start: 2024-07-30

## 2024-07-30 RX ORDER — NITROGLYCERIN 0.4 MG/1
TABLET SUBLINGUAL
Qty: 25 TABLET | Refills: 3 | Status: SHIPPED | OUTPATIENT
Start: 2024-07-30

## 2024-07-31 NOTE — PROGRESS NOTES
Physician Progress Note      PATIENT:               ANABEL CARLIN  CSN #:                  [id number]  :                       1960  ADMIT DATE:       2024 1:53 AM  DISCH DATE:        2024 2:24 PM  RESPONDING  PROVIDER #:        Angel Wilkins MD          QUERY TEXT:    Patient admitted post fall with traumatic SDH and ICH due to anticoagulation.   If possible, please document in progress notes and discharge summary if you   are evaluating and/or treating any of the following:    The medical record reflects the following:  Risk Factors: ICH, SDH  Clinical Indicators: CT head  shows increased left temporal parenchymal   hemorrhage with associated edema, mass effect, and local sulcal effacement;   cerebral edema is documented without mention of brain compression  Treatment: Labs, serial imaging, monitoring in ICU, NS consult  Options provided:  -- Traumatic brain compression  -- Traumatic brain compression not clinically significant  -- Other - I will add my own diagnosis  -- Disagree - Not applicable / Not valid  -- Disagree - Clinically unable to determine / Unknown  -- Refer to Clinical Documentation Reviewer    PROVIDER RESPONSE TEXT:    Provider disagreed with this query.    Query created by: Zoraida Hatfield on 2024 4:58 PM      Electronically signed by:  Angel Wilkins MD 2024 6:24 AM
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
## 2024-08-28 ENCOUNTER — HOSPITAL ENCOUNTER (OUTPATIENT)
Dept: PHYSICAL THERAPY | Age: 64
Setting detail: THERAPIES SERIES
Discharge: HOME | End: 2024-08-28
Payer: MEDICARE

## 2024-08-28 PROCEDURE — 97165 OT EVAL LOW COMPLEX 30 MIN: CPT

## 2024-08-28 NOTE — DISCHARGE SUMMARY
[facility]  OCCUPATIONAL THERAPY  DRIVING EVALUATION    PATIENT: Malik Ramirez  YOB: 1960  GENDER:  male  CSN: [id number]     Referring Practitioner Erick Veronica MD [id number]      Diagnosis Unspecified injury of head, initial encounter  Unspecified fall, initial encounter   Treatment Diagnosis R41.89 Other Symptoms and Signs Involving Cognitive Functions and Awareness   Date of Evaluation 8/28/24   Driving History Pt reporting he has a current drivers license. He has been driving since injury. Pt reporting he has been mostly driving in [employer] for short distances.    Additional Pertinent History Malik Ramirez has a past medical history of Anxiety, Arthritis, Atrial fibrillation (HCC), CAD (coronary artery disease), Cardiomyopathy (HCC), and Hypertension.  he has a past surgical history that includes Carpal tunnel release (Right); Rotator cuff repair; Tonsillectomy; and Cardioversion.     Allergies No Known Allergies   Medications   Current Outpatient Medications:     nitroGLYCERIN (NITROSTAT) 0.4 MG SL tablet, up to max of 3 total doses. If no relief after 1 dose, call 911., Disp: 25 tablet, Rfl: 3    rosuvastatin (CRESTOR) 40 MG tablet, Take 1 tablet by mouth nightly, Disp: 90 tablet, Rfl: 0    traMADol (ULTRAM) 50 MG tablet, Take 1 tablet by mouth every 6 hours as needed., Disp: , Rfl:     clopidogrel (PLAVIX) 75 MG tablet, Take 1 tablet by mouth daily, Disp: , Rfl:     dapagliflozin (FARXIGA) 10 MG tablet, Take 1 tablet by mouth every morning, Disp: , Rfl:     digoxin (LANOXIN) 125 MCG tablet, Take 1 tablet by mouth daily, Disp: , Rfl:     levETIRAcetam (KEPPRA) 500 MG tablet, Take 1 tablet by mouth 2 times daily for 7 days, Disp: 14 tablet, Rfl: 0    potassium chloride (KLOR-CON) 10 MEQ extended release tablet, Take 1 tablet by mouth daily, Disp: 90 tablet, Rfl: 3    sacubitril-valsartan (ENTRESTO) 24-26 MG per tablet, Take 1 tablet by mouth 2 times daily,  performance is above 0.55 seconds)   Stopping distance: 213.81 (Good performance is less than 175 feet, moderate is between 175 and 220 feet, greater than 220 feet is considered poor.)   Speed at stimulus: 53.86   Notes:     SIMULATED DRIVING TREATMENT:  BASIC VEHICLE CONTROL:  ([place], 40 MPH road. There are 4-way stops with minimal cross traffic and signalized intersections with lights that change from green to red. There are some pedestrians at the final intersection.)   Total number of off road crashes: 0 (Good performance is 0.)   Total number of collisions with vehicles and other roadway objects: 1 (Good performance is 0.)   Total number of traffic light tickets: 0 (Good performance is 0.)   Total number of stop sign tickets: 2 (Good performance is 0.)   Percentage of time over the posted speed limit: 4.38 (Good performance would be less than 5%, moderate would be less than 10% and anything greater than 10% would be poor)   Percentage of time out of lanes: 39.54 (Good performance would be less than 1%, moderate would be less than 2.5% and anything greater than 2.5% would be poor)   Number of correctly negotiated intersections: 2   Number of incorrectly negotiated intersections: 3 (Good performance is 0.)   Overall turn signal usage: poor (Good means  used their turn signals properly more than 75% of the time; moderate means  used their turn signals properly between 25-75% of the time, poor means  used their turn signals properly less than 25% of the time)  Notes: max cues for staying on correct side of roadway. Continued difficulty with divided attention of staying on roadway and speed with pt dropping down to 10MPH at times        ASSESSMENT AND PLAN    RESULTS: Patient tested unsafe to return to independent driving due to his slow reaction time and requiring increased distance to stop as evidenced with the [employer] and in the simulator, his inability to maintain safe driving skills of

## 2024-10-04 RX ORDER — ROSUVASTATIN CALCIUM 40 MG/1
40 TABLET, COATED ORAL NIGHTLY
Qty: 90 TABLET | Refills: 0 | Status: SHIPPED | OUTPATIENT
Start: 2024-10-04

## 2024-11-04 RX ORDER — METOPROLOL SUCCINATE 100 MG/1
100 TABLET, EXTENDED RELEASE ORAL DAILY
Qty: 90 TABLET | Refills: 3 | Status: SHIPPED | OUTPATIENT
Start: 2024-11-04

## 2024-11-25 ENCOUNTER — TELEPHONE (OUTPATIENT)
Dept: NEUROSURGERY | Age: 64
End: 2024-11-25

## 2024-11-25 RX ORDER — APIXABAN 5 MG/1
TABLET, FILM COATED ORAL
Qty: 180 TABLET | Refills: 3 | OUTPATIENT
Start: 2024-11-25

## 2024-11-25 NOTE — TELEPHONE ENCOUNTER
Youngest daughter called and was asking information about her father. Writer explained due to HIPAA writer was unable to discuss fathers treatment with her. Pts daughter Vicenta is the only one listed on the HIPAA form. Writer encouraged daughter to contact her for information. KL

## 2025-01-02 ENCOUNTER — TELEPHONE (OUTPATIENT)
Dept: CARDIOLOGY CLINIC | Age: 65
End: 2025-01-02

## 2025-01-02 NOTE — TELEPHONE ENCOUNTER
We still have patients home meds in the med room.     Called pt.  LM for pt to return call. Asking if he still needs these meds.  Looks like they were filled in April, May, and June of 2024.    The girls going to [facility] have taken them twice and called.  No one has picked them up.

## 2025-01-06 RX ORDER — ROSUVASTATIN CALCIUM 40 MG/1
40 TABLET, COATED ORAL NIGHTLY
Qty: 90 TABLET | Refills: 0 | OUTPATIENT
Start: 2025-01-06

## 2025-01-21 ENCOUNTER — HOSPITAL ENCOUNTER (OUTPATIENT)
Age: 65
Discharge: HOME | End: 2025-01-21
Payer: MEDICARE

## 2025-01-21 ENCOUNTER — OFFICE VISIT (OUTPATIENT)
Dept: CARDIOLOGY CLINIC | Age: 65
End: 2025-01-21
Payer: MEDICARE

## 2025-01-21 VITALS
HEART RATE: 70 BPM | BODY MASS INDEX: 20.32 KG/M2 | HEIGHT: 72 IN | WEIGHT: 150 LBS | DIASTOLIC BLOOD PRESSURE: 98 MMHG | SYSTOLIC BLOOD PRESSURE: 162 MMHG

## 2025-01-21 DIAGNOSIS — I50.20 HFREF (HEART FAILURE WITH REDUCED EJECTION FRACTION) (HCC): Primary | ICD-10-CM

## 2025-01-21 DIAGNOSIS — I50.20 HFREF (HEART FAILURE WITH REDUCED EJECTION FRACTION) (HCC): ICD-10-CM

## 2025-01-21 DIAGNOSIS — I25.10 CORONARY ARTERY DISEASE INVOLVING NATIVE CORONARY ARTERY OF NATIVE HEART WITHOUT ANGINA PECTORIS: ICD-10-CM

## 2025-01-21 DIAGNOSIS — Z95.820 S/P ANGIOPLASTY WITH STENT: ICD-10-CM

## 2025-01-21 DIAGNOSIS — I48.0 PAROXYSMAL ATRIAL FIBRILLATION (HCC): ICD-10-CM

## 2025-01-21 LAB
ANION GAP SERPL CALC-SCNC: 9 MEQ/L (ref 8–16)
BUN SERPL-MCNC: 4 MG/DL (ref 7–22)
CALCIUM SERPL-MCNC: 8.7 MG/DL (ref 8.5–10.5)
CHLORIDE SERPL-SCNC: 102 MEQ/L (ref 98–111)
CO2 SERPL-SCNC: 26 MEQ/L (ref 23–33)
CREAT SERPL-MCNC: 0.7 MG/DL (ref 0.4–1.2)
GFR SERPL CREATININE-BSD FRML MDRD: > 90 ML/MIN/1.73M2
GLUCOSE SERPL-MCNC: 99 MG/DL (ref 70–108)
POTASSIUM SERPL-SCNC: 3.9 MEQ/L (ref 3.5–5.2)
SODIUM SERPL-SCNC: 137 MEQ/L (ref 135–145)

## 2025-01-21 PROCEDURE — 3017F COLORECTAL CA SCREEN DOC REV: CPT | Performed by: PHYSICIAN ASSISTANT

## 2025-01-21 PROCEDURE — G8420 CALC BMI NORM PARAMETERS: HCPCS | Performed by: PHYSICIAN ASSISTANT

## 2025-01-21 PROCEDURE — 36415 COLL VENOUS BLD VENIPUNCTURE: CPT

## 2025-01-21 PROCEDURE — 80048 BASIC METABOLIC PNL TOTAL CA: CPT

## 2025-01-21 PROCEDURE — G8427 DOCREV CUR MEDS BY ELIG CLIN: HCPCS | Performed by: PHYSICIAN ASSISTANT

## 2025-01-21 PROCEDURE — 1159F MED LIST DOCD IN RCRD: CPT | Performed by: PHYSICIAN ASSISTANT

## 2025-01-21 PROCEDURE — 99214 OFFICE O/P EST MOD 30 MIN: CPT | Performed by: PHYSICIAN ASSISTANT

## 2025-01-21 PROCEDURE — 4004F PT TOBACCO SCREEN RCVD TLK: CPT | Performed by: PHYSICIAN ASSISTANT

## 2025-01-21 PROCEDURE — 1124F ACP DISCUSS-NO DSCNMKR DOCD: CPT | Performed by: PHYSICIAN ASSISTANT

## 2025-01-21 RX ORDER — APIXABAN 5 MG/1
5 TABLET, FILM COATED ORAL 2 TIMES DAILY
COMMUNITY
Start: 2024-11-07

## 2025-01-21 RX ORDER — M-VIT,TX,IRON,MINS/CALC/FOLIC 27MG-0.4MG
1 TABLET ORAL DAILY
COMMUNITY

## 2025-01-21 RX ORDER — MIDODRINE HYDROCHLORIDE 5 MG/1
5 TABLET ORAL 3 TIMES DAILY
COMMUNITY
Start: 2024-12-16

## 2025-01-21 RX ORDER — ACETAMINOPHEN 325 MG/1
650 TABLET ORAL EVERY 6 HOURS PRN
COMMUNITY

## 2025-01-21 NOTE — PROGRESS NOTES
[facility] [place] [facility]  [facility] CARDIOLOGY  [address]  [address]  [facility] [facility] [address]  Dept: [phone]  Dept Fax: [phone]  Loc: [phone]    Chief Complaint   Patient presents with    Follow-Up from Hospital       History of Present Illness  The patient is a 64-year-old male with a history of recent hospitalization due to acute on chronic diastolic congestive heart failure, presenting for a follow-up visit.    He was previously admitted to [facility] following a fall that resulted in a shin injury, necessitating wound care at home. During one of these home visits, his oxygen levels were found to be significantly low, prompting his hospital admission. He does not recall experiencing shortness of breath at the time of hospitalization. Post-discharge, he reports feeling well and does not experience shortness of breath during ambulation. He has been receiving oxygen therapy at [facility] for several weeks, where he is currently residing. He reports no chest pain and has never required nitroglycerin. He is uncertain about his current medication regimen.   His blood pressure is monitored both at the nursing home and at home, with no reported elevations. His most recent blood pressure reading was 120/62 this morning.  He has a history of alcohol abuse and had not been eating well prior to his hospitalization and placement in extended-care..    SOCIAL HISTORY  Currently not drinking due to being in the extended care facility.  MEDICATIONS  Current: Apixaban, Jardiance, midodrine, rosuvastatin, amiodarone, Plavix  Discontinued: Entresto, metoprolol, Aldactone       Cardiologist:  Dr. Gonzalez        General:   No fever, no chills, No fatigue or weight loss  Pulmonary:    No dyspnea, no wheezing  Cardiac:    Denies recent chest pain   GI:     No nausea or vomiting, no abdominal pain  Neuro:    No dizziness or light headedness  Musculoskeletal:  No

## 2025-01-21 NOTE — PATIENT INSTRUCTIONS
You may receive a survey regarding the care you received during your visit. We encourage you to complete and return your survey, as your input is valuable to us. We hope you will choose us in the future for your healthcare needs. Thank you!    Your Medical Assistant today: Annmarie  Thank you for coming to our office! It was a pleasure to serve you.

## 2025-01-22 RX ORDER — EMPAGLIFLOZIN 10 MG/1
10 TABLET, FILM COATED ORAL DAILY
Qty: 90 TABLET | Refills: 0 | Status: SHIPPED | OUTPATIENT
Start: 2025-01-22

## 2025-03-27 ENCOUNTER — OFFICE VISIT (OUTPATIENT)
Dept: CARDIOLOGY CLINIC | Age: 65
End: 2025-03-27
Payer: MEDICARE

## 2025-03-27 VITALS
WEIGHT: 126 LBS | OXYGEN SATURATION: 96 % | DIASTOLIC BLOOD PRESSURE: 80 MMHG | HEART RATE: 81 BPM | HEIGHT: 72 IN | SYSTOLIC BLOOD PRESSURE: 152 MMHG | BODY MASS INDEX: 17.07 KG/M2

## 2025-03-27 DIAGNOSIS — I10 ESSENTIAL HYPERTENSION: ICD-10-CM

## 2025-03-27 DIAGNOSIS — I25.5 ISCHEMIC CARDIOMYOPATHY: ICD-10-CM

## 2025-03-27 DIAGNOSIS — I50.32 HEART FAILURE WITH IMPROVED EJECTION FRACTION (HFIMPEF) (HCC): Primary | ICD-10-CM

## 2025-03-27 DIAGNOSIS — I25.10 ATHEROSCLEROSIS OF NATIVE CORONARY ARTERY OF NATIVE HEART WITHOUT ANGINA PECTORIS: ICD-10-CM

## 2025-03-27 PROCEDURE — G8427 DOCREV CUR MEDS BY ELIG CLIN: HCPCS | Performed by: NURSE PRACTITIONER

## 2025-03-27 PROCEDURE — 3079F DIAST BP 80-89 MM HG: CPT | Performed by: NURSE PRACTITIONER

## 2025-03-27 PROCEDURE — 3017F COLORECTAL CA SCREEN DOC REV: CPT | Performed by: NURSE PRACTITIONER

## 2025-03-27 PROCEDURE — G8419 CALC BMI OUT NRM PARAM NOF/U: HCPCS | Performed by: NURSE PRACTITIONER

## 2025-03-27 PROCEDURE — 3077F SYST BP >= 140 MM HG: CPT | Performed by: NURSE PRACTITIONER

## 2025-03-27 PROCEDURE — 1124F ACP DISCUSS-NO DSCNMKR DOCD: CPT | Performed by: NURSE PRACTITIONER

## 2025-03-27 PROCEDURE — 4004F PT TOBACCO SCREEN RCVD TLK: CPT | Performed by: NURSE PRACTITIONER

## 2025-03-27 PROCEDURE — 1159F MED LIST DOCD IN RCRD: CPT | Performed by: NURSE PRACTITIONER

## 2025-03-27 PROCEDURE — 99214 OFFICE O/P EST MOD 30 MIN: CPT | Performed by: NURSE PRACTITIONER

## 2025-03-27 PROCEDURE — 1160F RVW MEDS BY RX/DR IN RCRD: CPT | Performed by: NURSE PRACTITIONER

## 2025-03-27 RX ORDER — NITROGLYCERIN 0.4 MG/1
TABLET SUBLINGUAL
Qty: 25 TABLET | Refills: 3 | Status: SHIPPED | OUTPATIENT
Start: 2025-03-27

## 2025-03-27 RX ORDER — METOPROLOL SUCCINATE 25 MG/1
25 TABLET, EXTENDED RELEASE ORAL DAILY
Qty: 90 TABLET | Refills: 3 | Status: SHIPPED | OUTPATIENT
Start: 2025-03-27

## 2025-03-27 RX ORDER — ASPIRIN 81 MG/1
81 TABLET ORAL DAILY
Qty: 90 TABLET | Refills: 3 | Status: SHIPPED | OUTPATIENT
Start: 2025-03-27

## 2025-03-27 RX ORDER — POTASSIUM CHLORIDE 750 MG/1
10 TABLET, EXTENDED RELEASE ORAL DAILY
Qty: 90 TABLET | Refills: 3
Start: 2025-03-27

## 2025-03-27 ASSESSMENT — ENCOUNTER SYMPTOMS
SHORTNESS OF BREATH: 0
ABDOMINAL DISTENTION: 0
COUGH: 0

## 2025-03-27 NOTE — PROGRESS NOTES
function has improved.   .   Left ventricle size is normal.   Left ventricular systolic function is mildly reduced.   Ejection fraction is visually estimated at 40-45%.   Normal right ventricular size and function.   No evidence of any pericardial effusion.      Signature      ----------------------------------------------------------------   Electronically signed by Nydia Chaidez MD (Interpreting   physician) on 03/09/2022 at 10:45 AM   ----------------------------------------------------------------    ECHO:    Summary   Ejection fraction is visually estimated at 25%.   There was severe global hypokinesis of the left ventricle.   Mild mitral regurgitation is present.      Signature      ----------------------------------------------------------------   Electronically signed by Caryl Polanco MD (Interpreting   physician) on 11/10/2021 at 04:08 PM        CATH/STRESS:   IMPRESSION:  1.  Ischemic cardiomyopathy.  2.  Coronary artery disease.  3.  Severely stenotic lesion of mid segment of left anterior descending  artery, status post successful PCI and stenting.     PLAN:  The patient will be admitted to hospital for observation post PCI  of LAD.  Aggressive risk factor modification.  Dual-antiplatelet therapy  with aspirin and Plavix.  High intensity statin therapy.   Guideline-directed medical therapy for congestive heart failure with  reduced ejection fraction.  May resume oral anticoagulation with Eliquis  tomorrow if condition remains stable.  The patient is on Eliquis for  newly diagnosed atrial fibrillation.     STACEY LARKIN MD     D: 12/06/2021 15:20:32       Results reviewed:  BNP: No results found for: \"BNP\"  CBC:   Lab Results   Component Value Date/Time    WBC 6.8 07/16/2024 03:56 AM    RBC 3.09 07/16/2024 03:56 AM    HGB 10.6 07/16/2024 03:56 AM    HCT 32.2 07/16/2024 03:56 AM     07/16/2024 03:56 AM     CMP:    Lab Results   Component Value Date/Time     01/21/2025 12:02 PM    K 3.9

## 2025-03-27 NOTE — PATIENT INSTRUCTIONS
You may receive a survey regarding the care you received during your visit.  Your input is valuable to us.  We encourage you to complete and return your survey.  We hope you will choose us in the future for your healthcare needs.    Your nurses today were Jackelyn Bean and Franchesca.  Office hours:   Mon-Thurs 8-4:30  Friday 8-12  Phone: [phone]    Continue:  Continue current medications  Daily weights and record  Fluid restriction of 2 Liters per day  Limit sodium in diet to around 4262-4489 mg/day  Monitor BP    Call the [facility] Failure [facility] for any of the following symptoms:   Weight gain of 3 pounds in 1 day or 5 pounds in 1 week  Increased shortness of breath  Shortness of breath while laying down  Chest pain  Swelling in feet, ankles or legs  Bloating in abdomen  Fatigue        OT referral with Driving Eval in comments with proper diagnosis - type of cognitive dx

## 2025-04-08 RX ORDER — ROSUVASTATIN CALCIUM 40 MG/1
40 TABLET, COATED ORAL NIGHTLY
Qty: 90 TABLET | Refills: 0 | Status: SHIPPED | OUTPATIENT
Start: 2025-04-08

## 2025-04-08 NOTE — TELEPHONE ENCOUNTER
Malik Ramirez called requesting a refill on the following medications:  Requested Prescriptions     Pending Prescriptions Disp Refills    rosuvastatin (CRESTOR) 40 MG tablet 90 tablet 0     Sig: Take 1 tablet by mouth nightly     Pharmacy verified: [facility] in [place]   .lucia      Date of last visit: 1/21/2025  Date of next visit (if applicable): 4/21/2025

## 2025-04-21 ENCOUNTER — OFFICE VISIT (OUTPATIENT)
Dept: CARDIOLOGY CLINIC | Age: 65
End: 2025-04-21
Payer: MEDICARE

## 2025-04-21 VITALS
BODY MASS INDEX: 19.56 KG/M2 | HEART RATE: 68 BPM | SYSTOLIC BLOOD PRESSURE: 153 MMHG | DIASTOLIC BLOOD PRESSURE: 91 MMHG | HEIGHT: 72 IN | WEIGHT: 144.4 LBS

## 2025-04-21 DIAGNOSIS — I25.10 CORONARY ARTERY DISEASE INVOLVING NATIVE CORONARY ARTERY OF NATIVE HEART WITHOUT ANGINA PECTORIS: ICD-10-CM

## 2025-04-21 DIAGNOSIS — I50.32 HEART FAILURE WITH IMPROVED EJECTION FRACTION (HFIMPEF) (HCC): Primary | ICD-10-CM

## 2025-04-21 DIAGNOSIS — Z72.0 TOBACCO ABUSE: ICD-10-CM

## 2025-04-21 DIAGNOSIS — I48.0 PAROXYSMAL ATRIAL FIBRILLATION (HCC): ICD-10-CM

## 2025-04-21 DIAGNOSIS — Z95.820 S/P ANGIOPLASTY WITH STENT: ICD-10-CM

## 2025-04-21 PROCEDURE — 4004F PT TOBACCO SCREEN RCVD TLK: CPT | Performed by: PHYSICIAN ASSISTANT

## 2025-04-21 PROCEDURE — 99214 OFFICE O/P EST MOD 30 MIN: CPT | Performed by: PHYSICIAN ASSISTANT

## 2025-04-21 PROCEDURE — G8420 CALC BMI NORM PARAMETERS: HCPCS | Performed by: PHYSICIAN ASSISTANT

## 2025-04-21 PROCEDURE — 1124F ACP DISCUSS-NO DSCNMKR DOCD: CPT | Performed by: PHYSICIAN ASSISTANT

## 2025-04-21 PROCEDURE — G8427 DOCREV CUR MEDS BY ELIG CLIN: HCPCS | Performed by: PHYSICIAN ASSISTANT

## 2025-04-21 PROCEDURE — 99406 BEHAV CHNG SMOKING 3-10 MIN: CPT | Performed by: PHYSICIAN ASSISTANT

## 2025-04-21 PROCEDURE — 3017F COLORECTAL CA SCREEN DOC REV: CPT | Performed by: PHYSICIAN ASSISTANT

## 2025-04-21 PROCEDURE — 1159F MED LIST DOCD IN RCRD: CPT | Performed by: PHYSICIAN ASSISTANT

## 2025-04-21 RX ORDER — POTASSIUM CHLORIDE 750 MG/1
10 TABLET, EXTENDED RELEASE ORAL DAILY
Qty: 90 TABLET | Refills: 3 | Status: SHIPPED | OUTPATIENT
Start: 2025-04-21

## 2025-04-21 RX ORDER — VALSARTAN 80 MG/1
80 TABLET ORAL DAILY
Qty: 90 TABLET | Refills: 3 | Status: SHIPPED | OUTPATIENT
Start: 2025-04-21

## 2025-04-21 NOTE — PROGRESS NOTES
[facility] [place] [facility]  [facility] CARDIOLOGY  [address]  [address]  [place] [facility] [address]  Dept: [phone]  Dept Fax: [phone]  Loc: [phone]    Chief Complaint   Patient presents with    3 Month Follow-Up    Other     Not sleeping well      Office visit 1/21/2025  The patient is a 64-year-old male with a history of recent hospitalization due to acute on chronic diastolic congestive heart failure, presenting for a follow-up visit.     He was previously admitted to [facility] following a fall that resulted in a shin injury, necessitating wound care at home. During one of these home visits, his oxygen levels were found to be significantly low, prompting his hospital admission. He does not recall experiencing shortness of breath at the time of hospitalization. Post-discharge, he reports feeling well and does not experience shortness of breath during ambulation. He has been receiving oxygen therapy at [facility] for several weeks, where he is currently residing. He reports no chest pain and has never required nitroglycerin. He is uncertain about his current medication regimen.   His blood pressure is monitored both at the nursing home and at home, with no reported elevations. His most recent blood pressure reading was 120/62 this morning.  He has a history of alcohol abuse and had not been eating well prior to his hospitalization and placement in extended-care..    SOCIAL HISTORY  Currently not drinking due to being in the extended care facility.  MEDICATIONS  Current: Apixaban, Jardiance, midodrine, rosuvastatin, amiodarone, Plavix  Discontinued: Entresto, metoprolol, Aldactone        Cardiologist:  Dr. Gonzalez  History of Present Illness  The patient is a 64-year-old gentleman with a history of heart failure with preserved ejection fraction, paroxysmal atrial fibrillation, history of cardioversion, and coronary artery disease status post PCI of the LAD on